# Patient Record
Sex: FEMALE | Race: ASIAN | NOT HISPANIC OR LATINO | Employment: FULL TIME
[De-identification: names, ages, dates, MRNs, and addresses within clinical notes are randomized per-mention and may not be internally consistent; named-entity substitution may affect disease eponyms.]

---

## 2019-08-02 ENCOUNTER — RECORDS - HEALTHEAST (OUTPATIENT)
Dept: ADMINISTRATIVE | Facility: OTHER | Age: 34
End: 2019-08-02

## 2019-08-23 ENCOUNTER — COMMUNICATION - HEALTHEAST (OUTPATIENT)
Dept: TELEHEALTH | Facility: CLINIC | Age: 34
End: 2019-08-23

## 2019-08-23 ENCOUNTER — HOSPITAL ENCOUNTER (OUTPATIENT)
Dept: RADIOLOGY | Facility: CLINIC | Age: 34
Discharge: HOME | End: 2019-08-23
Admitting: RADIOLOGY

## 2019-08-23 DIAGNOSIS — Z31.41 FERTILITY TESTING: ICD-10-CM

## 2019-10-29 ASSESSMENT — MIFFLIN-ST. JEOR: SCORE: 1310.5

## 2019-10-30 ENCOUNTER — ANESTHESIA - HEALTHEAST (OUTPATIENT)
Dept: SURGERY | Facility: HOSPITAL | Age: 34
End: 2019-10-30

## 2019-10-30 ENCOUNTER — SURGERY - HEALTHEAST (OUTPATIENT)
Dept: SURGERY | Facility: HOSPITAL | Age: 34
End: 2019-10-30

## 2019-12-17 ASSESSMENT — MIFFLIN-ST. JEOR: SCORE: 1296.9

## 2019-12-18 ENCOUNTER — ANESTHESIA - HEALTHEAST (OUTPATIENT)
Dept: SURGERY | Facility: AMBULATORY SURGERY CENTER | Age: 34
End: 2019-12-18

## 2019-12-19 ENCOUNTER — SURGERY - HEALTHEAST (OUTPATIENT)
Dept: SURGERY | Facility: AMBULATORY SURGERY CENTER | Age: 34
End: 2019-12-19

## 2019-12-19 ASSESSMENT — MIFFLIN-ST. JEOR: SCORE: 1296.9

## 2020-07-23 ENCOUNTER — MEDICAL CORRESPONDENCE (OUTPATIENT)
Dept: HEALTH INFORMATION MANAGEMENT | Facility: CLINIC | Age: 35
End: 2020-07-23

## 2020-07-31 NOTE — PROGRESS NOTES
-    SUBJECTIVE:                                                   Lavelle Montana is a 35 year old female who presents to clinic today for the following health issue(s):  Patient presents with:  Consult: going through infertility and was told there was something wrong with her cervical canal, letter sent per pt      HPI: The patient is a 35-year-old  0 who is referred for evaluation of cervical stenosis.  She had a laparoscopy in the fall that showed endometriosis.  She was brought back for a second procedure with ultrasound guidance for cervical dilatation.  She did have a hysterosalpingogram that was difficult.  Her menarche was at age 15 and she has relatively regular and sometimes heavy cycles with clots.  She has had no cervical procedures prior to the dilatation that should have caused the cervical stenosis.      Patient's last menstrual period was 2020 (exact date)..     Patient is sexually active, No obstetric history on file..  Using none for contraception.    reports that she has never smoked. She has never used smokeless tobacco.    STD testing offered?  Declined    Health maintenance updated:  yes      Problem list and histories reviewed & adjusted, as indicated.  Additional history: as documented.    Patient Active Problem List   Diagnosis     Other jaw asymmetry     Past Surgical History:   Procedure Laterality Date     HYSTEROSCOPY       LE FORT ONE , OSTEOTOMY SAGITTAL SPLIT, COMBINED  2014    Procedure: COMBINED LE FORT ONE, OSTEOTOMY SAGITTAL SPLIT;  Surgeon: Javi Coello DDS;  Location:  OR      Social History     Tobacco Use     Smoking status: Never Smoker     Smokeless tobacco: Never Used   Substance Use Topics     Alcohol use: Yes     Comment: on weekends           Current Outpatient Medications   Medication Sig     multivitamin, therapeutic with minerals (MULTI-VITAMIN) TABS Take 1 tablet by mouth daily     No current facility-administered medications for this visit.   "    Allergies   Allergen Reactions     Amoxicillin Unknown       ROS:  12 point review of systems negative other than symptoms noted below or in the HPI.  No urinary frequency or dysuria, bladder or kidney problems      OBJECTIVE:     /78   Ht 1.549 m (5' 1\")   Wt 67.6 kg (149 lb)   LMP 08/04/2020 (Exact Date)   Breastfeeding No   BMI 28.15 kg/m    Body mass index is 28.15 kg/m .    Exam:  Constitutional:  Appearance: Well nourished, well developed alert, in no acute distress  Neck:  Lymph Nodes:  No lymphadenopathy present; Thyroid:  Gland size normal, nontender, no nodules or masses present on palpation  Chest:  Respiratory Effort:  Breathing unlabored. Clear to auscultation bilaterally.   Cardiovascular: Heart: Auscultation:  Regular rate, normal rhythm, no murmurs present  Gastrointestinal:  Abdominal Examination:  Abdomen nontender to palpation, tone normal without rigidity or guarding, no masses present, umbilicus without lesions; Liver/Spleen:  No hepatomegaly present, liver nontender to palpation; Hernias:  No hernias present  Lymphatic: Lymph Nodes:  No other lymphadenopathy present  Skin: General Inspection:  No rashes present, no lesions present, no areas of discoloration.  Neurologic:  Mental Status:  Oriented X3.  Normal strength and tone, sensory exam grossly normal, mentation intact and speech normal.    Psychiatric:  Mentation appears normal and affect normal/bright.  Pelvic Exam:  External Genitalia:     Normal appearance for age, no discharge present, no tenderness present, no inflammatory lesions present, color normal  Vagina:     Normal vaginal vault without central or paravaginal defects, no discharge present, no inflammatory lesions present, no masses present  Bladder:     Nontender to palpation  Urethra:   Urethral Body:  Urethra palpation normal, urethra structural support normal   Urethral Meatus:  No erythema or lesions present  Cervix:     Appearance healthy, no lesions present, " nontender to palpation, no bleeding present  Uterus:     Uterus: firm, normal sized and nontender, midplane in position.   Adnexa:     No adnexal tenderness present, no adnexal masses present  Perineum:     Perineum within normal limits, no evidence of trauma, no rashes or skin lesions present  Anus:     Anus within normal limits, no hemorrhoids present  Inguinal Lymph Nodes:     No lymphadenopathy present  Pubic Hair:     Normal pubic hair distribution for age  Genitalia and Groin:     No rashes present, no lesions present, no areas of discoloration, no masses present       In-Clinic Test Results:  No results found for this or any previous visit (from the past 24 hour(s)).    ASSESSMENT/PLAN:                                                        35-year-old patient with a history of endometriosis and cervical stenosis.  Her last ultrasound in May showed persistence of her ovarian cyst at 4 cm.  She has a low AMH and her reproductive doctor does not want to have a laparoscopy and treatment of her endometriosis.  We have discussed cervical over dilatation and hysteroscopy to make sure that the canal looks normal.  We will arrange this at her disposition.  She understands the risk and complications.        Uri Baldwin MD  [facility] [place]

## 2020-08-05 ENCOUNTER — TELEPHONE (OUTPATIENT)
Dept: OBGYN | Facility: CLINIC | Age: 35
End: 2020-08-05

## 2020-08-05 ENCOUNTER — OFFICE VISIT (OUTPATIENT)
Dept: OBGYN | Facility: CLINIC | Age: 35
End: 2020-08-05
Payer: COMMERCIAL

## 2020-08-05 ENCOUNTER — PREP FOR PROCEDURE (OUTPATIENT)
Dept: OBGYN | Facility: CLINIC | Age: 35
End: 2020-08-05

## 2020-08-05 VITALS
SYSTOLIC BLOOD PRESSURE: 110 MMHG | HEIGHT: 61 IN | WEIGHT: 149 LBS | BODY MASS INDEX: 28.13 KG/M2 | DIASTOLIC BLOOD PRESSURE: 78 MMHG

## 2020-08-05 DIAGNOSIS — N88.2 CERVICAL STENOSIS (UTERINE CERVIX): Primary | ICD-10-CM

## 2020-08-05 DIAGNOSIS — Z11.59 ENCOUNTER FOR SCREENING FOR OTHER VIRAL DISEASES: Primary | ICD-10-CM

## 2020-08-05 PROCEDURE — 99203 OFFICE O/P NEW LOW 30 MIN: CPT | Performed by: OBSTETRICS & GYNECOLOGY

## 2020-08-05 RX ORDER — MISOPROSTOL 200 UG/1
400 TABLET ORAL ONCE
Qty: 2 TABLET | Refills: 0 | Status: SHIPPED | OUTPATIENT
Start: 2020-08-05 | End: 2020-08-05

## 2020-08-05 ASSESSMENT — MIFFLIN-ST. JEOR: SCORE: 1313.24

## 2020-08-05 NOTE — TELEPHONE ENCOUNTER
Type of surgery: HYSTEROSCOPY, THERAPEUTIC, cervical dilatation  Location of surgery: [facility]  Date and time of surgery: 9/10/20 7:20- 8:10  Surgeon: Nicolasa  Pre-Op Appt Date: per Nicolasa he will update morning of surgery as she had exam today.  Post-Op Appt Date:    Packet sent out: handed 8/5/20  Pre-cert/Authorization completed:    Date: Chacha 8/5/20    Per coding help [id number]

## 2020-08-05 NOTE — TELEPHONE ENCOUNTER
Associated Diagnoses     Cervical stenosis (uterine cervix) [N88.2]  - Primary        Source Order Set     Order Set Name  Order ID     [id number]    Case Request: Case Info     Panel 1 (Provider: Uri Baldwin MD)     Procedures  Laterality  Anesthesia  Region    HYSTEROSCOPY, THERAPEUTIC, cervical dilatation  N/A  General        Requested date:     Location:  OR    Patient class:        Pre-op diagnoses:  Cervical stenosis (uterine cervix)    Scheduling Instructions     Additional Instructions for the Case

## 2020-08-05 NOTE — PROGRESS NOTES
"    SUBJECTIVE:                                                   Lavelle Montana is a 35 year old female who presents to clinic today for the following health issue(s):  Patient presents with:  Consult: going through infertility and was told there was something wrong with her cervical canal, letter sent per pt      Additional information: ***    HPI:  ***    No LMP recorded..     Patient {is/is not:502120::\"is\"} sexually active, .  Using {[facility] CONTRACEPTION:916309} for contraception.    reports that she has never smoked. She has never used smokeless tobacco.  {Tobacco Cessation -- Delete if patient is a non-smoker:475745}  STD testing offered?  {[facility] GC/CHLAMYDIA:259742}    Health maintenance updated:  {yes no:813725}    Today's PHQ-2 Score: No flowsheet data found.  Today's PHQ-9 Score: No flowsheet data found.  Today's ANU-7 Score: No flowsheet data found.    Problem list and histories reviewed & adjusted, as indicated.  Additional history: as documented.    Patient Active Problem List   Diagnosis     Other jaw asymmetry     Past Surgical History:   Procedure Laterality Date     HYSTEROSCOPY       LE FORT ONE , OSTEOTOMY SAGITTAL SPLIT, COMBINED  2014    Procedure: COMBINED LE FORT ONE, OSTEOTOMY SAGITTAL SPLIT;  Surgeon: Javi Coello DDS;  Location:  OR      Social History     Tobacco Use     Smoking status: Never Smoker     Smokeless tobacco: Never Used   Substance Use Topics     Alcohol use: Yes     Comment: on weekends           Current Outpatient Medications   Medication Sig     multivitamin, therapeutic with minerals (MULTI-VITAMIN) TABS Take 1 tablet by mouth daily     No current facility-administered medications for this visit.      Allergies   Allergen Reactions     Amoxicillin Unknown       ROS:  {[facility] ROSGYN:527298::\"12 point review of systems negative other than symptoms noted below or in the HPI.\"}  {ROS - :516100::\"No urinary frequency or dysuria, bladder or kidney " "problems\"}      OBJECTIVE:     Ht 1.549 m (5' 1\")   Wt 67.6 kg (149 lb)   BMI 28.15 kg/m    Body mass index is 28.15 kg/m .    Exam:  {[facility] EXAM CHOICES:657398}     In-Clinic Test Results:  No results found for this or any previous visit (from the past 24 hour(s)).    ASSESSMENT/PLAN:                                                      No diagnosis found.    There are no Patient Instructions on file for this visit.    ***    Uri Baldwin MD  [facility]  "

## 2020-09-03 RX ORDER — PRENATAL VIT/IRON FUM/FOLIC AC 27MG-0.8MG
1 TABLET ORAL DAILY
COMMUNITY
End: 2023-11-20

## 2020-09-06 ENCOUNTER — AMBULATORY - HEALTHEAST (OUTPATIENT)
Dept: FAMILY MEDICINE | Facility: CLINIC | Age: 35
End: 2020-09-06

## 2020-09-06 ENCOUNTER — AMBULATORY - HEALTHEAST (OUTPATIENT)
Dept: INTERNAL MEDICINE | Facility: CLINIC | Age: 35
End: 2020-09-06

## 2020-09-06 DIAGNOSIS — Z11.59 ENCOUNTER FOR SCREENING FOR OTHER VIRAL DISEASES: ICD-10-CM

## 2020-09-08 ENCOUNTER — COMMUNICATION - HEALTHEAST (OUTPATIENT)
Dept: SCHEDULING | Facility: CLINIC | Age: 35
End: 2020-09-08

## 2020-09-09 ENCOUNTER — ANESTHESIA EVENT (OUTPATIENT)
Dept: SURGERY | Facility: CLINIC | Age: 35
End: 2020-09-09
Payer: COMMERCIAL

## 2020-09-09 ENCOUNTER — TELEPHONE (OUTPATIENT)
Dept: OBGYN | Facility: CLINIC | Age: 35
End: 2020-09-09

## 2020-09-09 RX ORDER — PHENAZOPYRIDINE HYDROCHLORIDE 200 MG/1
200 TABLET, FILM COATED ORAL ONCE
Status: CANCELLED | OUTPATIENT
Start: 2020-09-09 | End: 2020-09-09

## 2020-09-09 NOTE — H&P
Cervical stenosis (uterine cervix)   Dx   Consult     Reason for Visit    Progress Notes        -     SUBJECTIVE:                                                   Lavelle Montana is a 35 year old female who presents to clinic today for the following health issue(s):  Patient presents with:  Consult: going through infertility and was told there was something wrong with her cervical canal, letter sent per pt        HPI: The patient is a 35-year-old  0 who is referred for evaluation of cervical stenosis.  She had a laparoscopy in the fall that showed endometriosis.  She was brought back for a second procedure with ultrasound guidance for cervical dilatation.  She did have a hysterosalpingogram that was difficult.  Her menarche was at age 15 and she has relatively regular and sometimes heavy cycles with clots.  She has had no cervical procedures prior to the dilatation that should have caused the cervical stenosis.        Patient's last menstrual period was 2020 (exact date)..      Patient is sexually active, No obstetric history on file..  Using none for contraception.    reports that she has never smoked. She has never used smokeless tobacco.     STD testing offered?  Declined     Health maintenance updated:  yes        Problem list and histories reviewed & adjusted, as indicated.  Additional history: as documented.         Patient Active Problem List   Diagnosis     Other jaw asymmetry             Past Surgical History:   Procedure Laterality Date     HYSTEROSCOPY         LE FORT ONE , OSTEOTOMY SAGITTAL SPLIT, COMBINED   2014     Procedure: COMBINED LE FORT ONE, OSTEOTOMY SAGITTAL SPLIT;  Surgeon: Javi Coello DDS;  Location:  OR       Social History            Tobacco Use     Smoking status: Never Smoker     Smokeless tobacco: Never Used   Substance Use Topics     Alcohol use: Yes       Comment: on weekends                 Current Outpatient Medications   Medication Sig     multivitamin,  "therapeutic with minerals (MULTI-VITAMIN) TABS Take 1 tablet by mouth daily      No current facility-administered medications for this visit.            Allergies   Allergen Reactions     Amoxicillin Unknown         ROS:  12 point review of systems negative other than symptoms noted below or in the HPI.  No urinary frequency or dysuria, bladder or kidney problems        OBJECTIVE:      /78   Ht 1.549 m (5' 1\")   Wt 67.6 kg (149 lb)   LMP 08/04/2020 (Exact Date)   Breastfeeding No   BMI 28.15 kg/m    Body mass index is 28.15 kg/m .     Exam:  Constitutional:  Appearance: Well nourished, well developed alert, in no acute distress  Neck:  Lymph Nodes:  No lymphadenopathy present; Thyroid:  Gland size normal, nontender, no nodules or masses present on palpation  Chest:  Respiratory Effort:  Breathing unlabored. Clear to auscultation bilaterally.   Cardiovascular: Heart: Auscultation:  Regular rate, normal rhythm, no murmurs present  Gastrointestinal:  Abdominal Examination:  Abdomen nontender to palpation, tone normal without rigidity or guarding, no masses present, umbilicus without lesions; Liver/Spleen:  No hepatomegaly present, liver nontender to palpation; Hernias:  No hernias present  Lymphatic: Lymph Nodes:  No other lymphadenopathy present  Skin: General Inspection:  No rashes present, no lesions present, no areas of discoloration.  Neurologic:  Mental Status:  Oriented X3.  Normal strength and tone, sensory exam grossly normal, mentation intact and speech normal.    Psychiatric:  Mentation appears normal and affect normal/bright.  Pelvic Exam:  External Genitalia:                Normal appearance for age, no discharge present, no tenderness present, no inflammatory lesions present, color normal  Vagina:                Normal vaginal vault without central or paravaginal defects, no discharge present, no inflammatory lesions present, no masses present  Bladder:                Nontender to " "palpation  Urethra:              Urethral Body:  Urethra palpation normal, urethra structural support normal              Urethral Meatus:  No erythema or lesions present  Cervix:                Appearance healthy, no lesions present, nontender to palpation, no bleeding present  Uterus:                Uterus: firm, normal sized and nontender, midplane in position.   Adnexa:                No adnexal tenderness present, no adnexal masses present  Perineum:                Perineum within normal limits, no evidence of trauma, no rashes or skin lesions present  Anus:                Anus within normal limits, no hemorrhoids present  Inguinal Lymph Nodes:                No lymphadenopathy present  Pubic Hair:                Normal pubic hair distribution for age  Genitalia and Groin:                No rashes present, no lesions present, no areas of discoloration, no masses present        In-Clinic Test Results:  No results found for this or any previous visit (from the past 24 hour(s)).     ASSESSMENT/PLAN:                                                          35-year-old patient with a history of endometriosis and cervical stenosis.  Her last ultrasound in May showed persistence of her ovarian cyst at 4 cm.  She has a low AMH and her reproductive doctor does not want to have a laparoscopy and treatment of her endometriosis.  We have discussed cervical over dilatation and hysteroscopy to make sure that the canal looks normal.  We will arrange this at her disposition.  She understands the risk and complications.           Uri Baldwin MD  [facility] [place]      Instructions      After Visit Summary (Automatic SnapShot taken 8/5/2020)   Additional Documentation     Vitals:     /78    Ht 1.549 m (5' 1\")    Wt 67.6 kg (149 lb)    LMP 08/04/2020 (Exact Date)    Breastfeeding No    BMI 28.15 kg/m     BSA 1.71 m        More Vitals    Flowsheets:     NICU VS,    Anthropometrics,    Vitals Reassessment, "    Lactation       Encounter Info:     Billing Info,    History,    Allergies,    Detailed Report       AVS Reports     Date/Time  Report  Action  User    8/5/2020 10:35 AM  After Visit Summary  Automatically Generated  Uri Baldwin MD    Encounter Information      Provider  Department  Encounter #  Center    8/5/2020 9:45 AM  Uri Baldwin MD  [facility]  [id number]  [facility]    Reviewed this Encounter      Medications  Problems  Allergies  History    Ninfa Guevara, UZMA    Reviewed  Alcohol, Drug Use, Medical, Obstetric, Sexual Activity, Surgical, Tobacco    Communicable/Travel screen     Communicable/Travel Screening  8/5/2020    In the last month, have you been in contact with someone who was confirmed or suspected to have Coronavirus / COVID-19?  No / Unsure    Do you have any of the following symptoms?  None of these    Have you traveled internationally in the last month?  No    View Complete Flowsheet     Orders Placed      None   Medication Changes         miSOPROStol 400 mcg Vaginal ONCE, Night before procedure      Medication List     Visit Diagnoses         Cervical stenosis (uterine cervix)      Problem List

## 2020-09-09 NOTE — TELEPHONE ENCOUNTER
Patient concerned that she was prescribed misoprostol prior to her procedure tomorrow and she has read that it can cause birth defects and abortions.    Reassured patient that this is only small one time dose to soften cervix for the procedure and will okay to take.     Pt verbalized understanding.  Routing to provider as CLIFF.  Gia Olguin RN on 9/9/2020 at 3:24 PM

## 2020-09-10 ENCOUNTER — ANESTHESIA (OUTPATIENT)
Dept: SURGERY | Facility: CLINIC | Age: 35
End: 2020-09-10
Payer: COMMERCIAL

## 2020-09-10 ENCOUNTER — SURGERY (OUTPATIENT)
Age: 35
End: 2020-09-10
Payer: COMMERCIAL

## 2020-09-10 ENCOUNTER — HOSPITAL ENCOUNTER (OUTPATIENT)
Facility: CLINIC | Age: 35
Discharge: HOME | End: 2020-09-10
Attending: OBSTETRICS & GYNECOLOGY | Admitting: OBSTETRICS & GYNECOLOGY
Payer: COMMERCIAL

## 2020-09-10 VITALS
HEART RATE: 71 BPM | SYSTOLIC BLOOD PRESSURE: 113 MMHG | HEIGHT: 60 IN | OXYGEN SATURATION: 97 % | WEIGHT: 152 LBS | BODY MASS INDEX: 29.84 KG/M2 | DIASTOLIC BLOOD PRESSURE: 75 MMHG | RESPIRATION RATE: 16 BRPM | TEMPERATURE: 98.1 F

## 2020-09-10 DIAGNOSIS — N88.2 CERVICAL STENOSIS (UTERINE CERVIX): ICD-10-CM

## 2020-09-10 LAB — B-HCG SERPL-ACNC: <1 IU/L (ref 0–5)

## 2020-09-10 PROCEDURE — 88305 TISSUE EXAM BY PATHOLOGIST: CPT | Mod: 26 | Performed by: OBSTETRICS & GYNECOLOGY

## 2020-09-10 PROCEDURE — 40000170 ZZH STATISTIC PRE-PROCEDURE ASSESSMENT II: Performed by: OBSTETRICS & GYNECOLOGY

## 2020-09-10 PROCEDURE — 25000125 ZZHC RX 250: Performed by: OBSTETRICS & GYNECOLOGY

## 2020-09-10 PROCEDURE — 25000566 ZZH SEVOFLURANE, EA 15 MIN: Performed by: OBSTETRICS & GYNECOLOGY

## 2020-09-10 PROCEDURE — 25800030 ZZH RX IP 258 OP 636: Performed by: NURSE ANESTHETIST, CERTIFIED REGISTERED

## 2020-09-10 PROCEDURE — 36000056 ZZH SURGERY LEVEL 3 1ST 30 MIN: Performed by: OBSTETRICS & GYNECOLOGY

## 2020-09-10 PROCEDURE — 25000132 ZZH RX MED GY IP 250 OP 250 PS 637: Performed by: OBSTETRICS & GYNECOLOGY

## 2020-09-10 PROCEDURE — 37000008 ZZH ANESTHESIA TECHNICAL FEE, 1ST 30 MIN: Performed by: OBSTETRICS & GYNECOLOGY

## 2020-09-10 PROCEDURE — 71000027 ZZH RECOVERY PHASE 2 EACH 15 MINS: Performed by: OBSTETRICS & GYNECOLOGY

## 2020-09-10 PROCEDURE — 25000128 H RX IP 250 OP 636: Performed by: NURSE ANESTHETIST, CERTIFIED REGISTERED

## 2020-09-10 PROCEDURE — 27210794 ZZH OR GENERAL SUPPLY STERILE: Performed by: OBSTETRICS & GYNECOLOGY

## 2020-09-10 PROCEDURE — 36000058 ZZH SURGERY LEVEL 3 EA 15 ADDTL MIN: Performed by: OBSTETRICS & GYNECOLOGY

## 2020-09-10 PROCEDURE — 25000128 H RX IP 250 OP 636: Performed by: ANESTHESIOLOGY

## 2020-09-10 PROCEDURE — 25000125 ZZHC RX 250: Performed by: NURSE ANESTHETIST, CERTIFIED REGISTERED

## 2020-09-10 PROCEDURE — 71000012 ZZH RECOVERY PHASE 1 LEVEL 1 FIRST HR: Performed by: OBSTETRICS & GYNECOLOGY

## 2020-09-10 PROCEDURE — 58558 HYSTEROSCOPY BIOPSY: CPT | Mod: 51 | Performed by: OBSTETRICS & GYNECOLOGY

## 2020-09-10 PROCEDURE — 25800025 ZZH RX 258: Performed by: OBSTETRICS & GYNECOLOGY

## 2020-09-10 PROCEDURE — 84702 CHORIONIC GONADOTROPIN TEST: CPT | Performed by: OBSTETRICS & GYNECOLOGY

## 2020-09-10 PROCEDURE — 58559 HYSTEROSCOPY LYSIS: CPT | Performed by: OBSTETRICS & GYNECOLOGY

## 2020-09-10 PROCEDURE — 37000009 ZZH ANESTHESIA TECHNICAL FEE, EACH ADDTL 15 MIN: Performed by: OBSTETRICS & GYNECOLOGY

## 2020-09-10 PROCEDURE — 88305 TISSUE EXAM BY PATHOLOGIST: CPT | Performed by: OBSTETRICS & GYNECOLOGY

## 2020-09-10 PROCEDURE — 36415 COLL VENOUS BLD VENIPUNCTURE: CPT | Performed by: OBSTETRICS & GYNECOLOGY

## 2020-09-10 RX ORDER — KETOROLAC TROMETHAMINE 30 MG/ML
INJECTION, SOLUTION INTRAMUSCULAR; INTRAVENOUS PRN
Status: DISCONTINUED | OUTPATIENT
Start: 2020-09-10 | End: 2020-09-10

## 2020-09-10 RX ORDER — HYDROCODONE BITARTRATE AND ACETAMINOPHEN 5; 325 MG/1; MG/1
1-2 TABLET ORAL EVERY 4 HOURS PRN
Qty: 10 TABLET | Refills: 0 | Status: SHIPPED | OUTPATIENT
Start: 2020-09-10 | End: 2020-09-21

## 2020-09-10 RX ORDER — HYDROMORPHONE HYDROCHLORIDE 1 MG/ML
.3-.5 INJECTION, SOLUTION INTRAMUSCULAR; INTRAVENOUS; SUBCUTANEOUS EVERY 10 MIN PRN
Status: DISCONTINUED | OUTPATIENT
Start: 2020-09-10 | End: 2020-09-10 | Stop reason: HOSPADM

## 2020-09-10 RX ORDER — FENTANYL CITRATE 50 UG/ML
25-100 INJECTION, SOLUTION INTRAMUSCULAR; INTRAVENOUS
Status: DISCONTINUED | OUTPATIENT
Start: 2020-09-10 | End: 2020-09-10 | Stop reason: HOSPADM

## 2020-09-10 RX ORDER — MAGNESIUM HYDROXIDE 1200 MG/15ML
LIQUID ORAL PRN
Status: DISCONTINUED | OUTPATIENT
Start: 2020-09-10 | End: 2020-09-10 | Stop reason: HOSPADM

## 2020-09-10 RX ORDER — SODIUM CHLORIDE, SODIUM LACTATE, POTASSIUM CHLORIDE, CALCIUM CHLORIDE 600; 310; 30; 20 MG/100ML; MG/100ML; MG/100ML; MG/100ML
INJECTION, SOLUTION INTRAVENOUS CONTINUOUS PRN
Status: DISCONTINUED | OUTPATIENT
Start: 2020-09-10 | End: 2020-09-10

## 2020-09-10 RX ORDER — ONDANSETRON 4 MG/1
4 TABLET, ORALLY DISINTEGRATING ORAL EVERY 30 MIN PRN
Status: DISCONTINUED | OUTPATIENT
Start: 2020-09-10 | End: 2020-09-10 | Stop reason: HOSPADM

## 2020-09-10 RX ORDER — PROPOFOL 10 MG/ML
INJECTION, EMULSION INTRAVENOUS PRN
Status: DISCONTINUED | OUTPATIENT
Start: 2020-09-10 | End: 2020-09-10

## 2020-09-10 RX ORDER — FENTANYL CITRATE 50 UG/ML
INJECTION, SOLUTION INTRAMUSCULAR; INTRAVENOUS PRN
Status: DISCONTINUED | OUTPATIENT
Start: 2020-09-10 | End: 2020-09-10

## 2020-09-10 RX ORDER — ONDANSETRON 2 MG/ML
4 INJECTION INTRAMUSCULAR; INTRAVENOUS EVERY 30 MIN PRN
Status: DISCONTINUED | OUTPATIENT
Start: 2020-09-10 | End: 2020-09-10 | Stop reason: HOSPADM

## 2020-09-10 RX ORDER — PROPOFOL 10 MG/ML
INJECTION, EMULSION INTRAVENOUS CONTINUOUS PRN
Status: DISCONTINUED | OUTPATIENT
Start: 2020-09-10 | End: 2020-09-10

## 2020-09-10 RX ORDER — MEPERIDINE HYDROCHLORIDE 25 MG/ML
12.5 INJECTION INTRAMUSCULAR; INTRAVENOUS; SUBCUTANEOUS
Status: DISCONTINUED | OUTPATIENT
Start: 2020-09-10 | End: 2020-09-10 | Stop reason: HOSPADM

## 2020-09-10 RX ORDER — LIDOCAINE HYDROCHLORIDE 20 MG/ML
INJECTION, SOLUTION INFILTRATION; PERINEURAL PRN
Status: DISCONTINUED | OUTPATIENT
Start: 2020-09-10 | End: 2020-09-10

## 2020-09-10 RX ORDER — FENTANYL CITRATE 50 UG/ML
25-50 INJECTION, SOLUTION INTRAMUSCULAR; INTRAVENOUS
Status: DISCONTINUED | OUTPATIENT
Start: 2020-09-10 | End: 2020-09-10 | Stop reason: HOSPADM

## 2020-09-10 RX ORDER — DEXAMETHASONE SODIUM PHOSPHATE 4 MG/ML
INJECTION, SOLUTION INTRA-ARTICULAR; INTRALESIONAL; INTRAMUSCULAR; INTRAVENOUS; SOFT TISSUE PRN
Status: DISCONTINUED | OUTPATIENT
Start: 2020-09-10 | End: 2020-09-10

## 2020-09-10 RX ORDER — NALOXONE HYDROCHLORIDE 0.4 MG/ML
.1-.4 INJECTION, SOLUTION INTRAMUSCULAR; INTRAVENOUS; SUBCUTANEOUS
Status: DISCONTINUED | OUTPATIENT
Start: 2020-09-10 | End: 2020-09-10 | Stop reason: HOSPADM

## 2020-09-10 RX ORDER — DIAZEPAM 10 MG/2ML
2.5 INJECTION, SOLUTION INTRAMUSCULAR; INTRAVENOUS
Status: DISCONTINUED | OUTPATIENT
Start: 2020-09-10 | End: 2020-09-10 | Stop reason: HOSPADM

## 2020-09-10 RX ORDER — KETOROLAC TROMETHAMINE 30 MG/ML
30 INJECTION, SOLUTION INTRAMUSCULAR; INTRAVENOUS EVERY 6 HOURS PRN
Status: DISCONTINUED | OUTPATIENT
Start: 2020-09-10 | End: 2020-09-10 | Stop reason: HOSPADM

## 2020-09-10 RX ORDER — HYDROCODONE BITARTRATE AND ACETAMINOPHEN 5; 325 MG/1; MG/1
2 TABLET ORAL
Status: COMPLETED | OUTPATIENT
Start: 2020-09-10 | End: 2020-09-10

## 2020-09-10 RX ORDER — ACETAMINOPHEN 650 MG/1
650 SUPPOSITORY RECTAL EVERY 4 HOURS PRN
Status: DISCONTINUED | OUTPATIENT
Start: 2020-09-10 | End: 2020-09-10 | Stop reason: HOSPADM

## 2020-09-10 RX ORDER — SODIUM CHLORIDE, SODIUM LACTATE, POTASSIUM CHLORIDE, CALCIUM CHLORIDE 600; 310; 30; 20 MG/100ML; MG/100ML; MG/100ML; MG/100ML
INJECTION, SOLUTION INTRAVENOUS CONTINUOUS
Status: DISCONTINUED | OUTPATIENT
Start: 2020-09-10 | End: 2020-09-10 | Stop reason: HOSPADM

## 2020-09-10 RX ORDER — ONDANSETRON 2 MG/ML
INJECTION INTRAMUSCULAR; INTRAVENOUS PRN
Status: DISCONTINUED | OUTPATIENT
Start: 2020-09-10 | End: 2020-09-10

## 2020-09-10 RX ADMIN — SILVER NITRATE APPLICATORS 2 EACH: 25; 75 STICK TOPICAL at 07:52

## 2020-09-10 RX ADMIN — PROPOFOL 100 MCG/KG/MIN: 10 INJECTION, EMULSION INTRAVENOUS at 07:24

## 2020-09-10 RX ADMIN — ONDANSETRON 4 MG: 2 INJECTION INTRAMUSCULAR; INTRAVENOUS at 07:31

## 2020-09-10 RX ADMIN — PROPOFOL 200 MG: 10 INJECTION, EMULSION INTRAVENOUS at 07:24

## 2020-09-10 RX ADMIN — MIDAZOLAM 2 MG: 1 INJECTION INTRAMUSCULAR; INTRAVENOUS at 07:22

## 2020-09-10 RX ADMIN — SODIUM CHLORIDE 1000 ML: 900 IRRIGANT IRRIGATION at 07:45

## 2020-09-10 RX ADMIN — LIDOCAINE HYDROCHLORIDE 60 MG: 20 INJECTION, SOLUTION INFILTRATION; PERINEURAL at 07:24

## 2020-09-10 RX ADMIN — FENTANYL CITRATE 50 MCG: 0.05 INJECTION, SOLUTION INTRAMUSCULAR; INTRAVENOUS at 08:28

## 2020-09-10 RX ADMIN — DEXAMETHASONE SODIUM PHOSPHATE 4 MG: 4 INJECTION, SOLUTION INTRA-ARTICULAR; INTRALESIONAL; INTRAMUSCULAR; INTRAVENOUS; SOFT TISSUE at 07:31

## 2020-09-10 RX ADMIN — FENTANYL CITRATE 50 MCG: 50 INJECTION, SOLUTION INTRAMUSCULAR; INTRAVENOUS at 07:39

## 2020-09-10 RX ADMIN — FENTANYL CITRATE 25 MCG: 50 INJECTION, SOLUTION INTRAMUSCULAR; INTRAVENOUS at 07:24

## 2020-09-10 RX ADMIN — SODIUM CHLORIDE 1000 ML: 900 IRRIGANT IRRIGATION at 07:42

## 2020-09-10 RX ADMIN — SODIUM CHLORIDE, POTASSIUM CHLORIDE, SODIUM LACTATE AND CALCIUM CHLORIDE: 600; 310; 30; 20 INJECTION, SOLUTION INTRAVENOUS at 07:22

## 2020-09-10 RX ADMIN — KETOROLAC TROMETHAMINE 30 MG: 30 INJECTION, SOLUTION INTRAMUSCULAR at 07:52

## 2020-09-10 RX ADMIN — SODIUM CHLORIDE 1000 ML: 900 IRRIGANT IRRIGATION at 07:37

## 2020-09-10 RX ADMIN — HYDROCODONE BITARTRATE AND ACETAMINOPHEN 2 TABLET: 5; 325 TABLET ORAL at 08:52

## 2020-09-10 ASSESSMENT — MIFFLIN-ST. JEOR: SCORE: 1310.97

## 2020-09-10 NOTE — ANESTHESIA PREPROCEDURE EVALUATION
Anesthesia Pre-Procedure Evaluation    Patient: Lavelle Montana   MRN: [id number] : 1985          Preoperative Diagnosis: Cervical stenosis (uterine cervix) [N88.2]    Procedure(s):  cervical dilatation AND HYSTEROSCOPY    Past Medical History:   Diagnosis Date     Cervical stenosis (uterine cervix)      Endometriosis      Infertility, female      Past Surgical History:   Procedure Laterality Date     HYSTEROSCOPY       LE FORT ONE , OSTEOTOMY SAGITTAL SPLIT, COMBINED  2014    Procedure: COMBINED LE FORT ONE, OSTEOTOMY SAGITTAL SPLIT;  Surgeon: Javi Coello DDS;  Location:  OR       Anesthesia Evaluation     . Pt has had prior anesthetic.     No history of anesthetic complications          ROS/MED HX    ENT/Pulmonary:      (-) sleep apnea   Neurologic:       Cardiovascular:         METS/Exercise Tolerance:     Hematologic:         Musculoskeletal:         GI/Hepatic:        (-) GERD   Renal/Genitourinary:         Endo:     (+) Obesity (BMI 30), .      Psychiatric:         Infectious Disease:         Malignancy:         Other:                          Physical Exam  Normal systems: cardiovascular, pulmonary and dental    Airway   Mallampati: II  TM distance: >3 FB  Neck ROM: full    Dental     Cardiovascular       Pulmonary             Lab Results   Component Value Date    HCG Negative 2014       Preop Vitals  BP Readings from Last 3 Encounters:   09/10/20 130/77   20 110/78   14 100/67    Pulse Readings from Last 3 Encounters:   14 64      Resp Readings from Last 3 Encounters:   09/10/20 16   14 16    SpO2 Readings from Last 3 Encounters:   09/10/20 95%   14 97%      Temp Readings from Last 1 Encounters:   09/10/20 36.8  C (98.3  F) (Oral)    Ht Readings from Last 1 Encounters:   09/10/20 1.524 m (5')      Wt Readings from Last 1 Encounters:   09/10/20 68.9 kg (152 lb)    Estimated body mass index is 29.69 kg/m  as calculated from the following:    Height as of this  encounter: 1.524 m (5').    Weight as of this encounter: 68.9 kg (152 lb).       Anesthesia Plan      History & Physical Review  History and physical reviewed and following examination; no interval change.    ASA Status:  2 .    NPO Status:  > 8 hours    Plan for General with Intravenous induction. Maintenance will be Balanced.    PONV prophylaxis:  Ondansetron (or other 5HT-3)  Propofol gtt, zofran, decadron  Toradol 30mg IV        Postoperative Care  Postoperative pain management:  IV analgesics.      Consents  Anesthetic plan, risks, benefits and alternatives discussed with:  Patient..                 Konstantin Rosen MD

## 2020-09-10 NOTE — OP NOTE
Procedure Date: 09/10/2020      REASON FOR ADMISSION:  Cervical stenosis.      POSTOPERATIVE DIAGNOSIS:  Cervical stenosis      PROCEDURES PERFORMED:  Examination under anesthesia, cervical dilatation, hysteroscopy, endometrial curettage, lysis of lower uterine segment to cervix adhesive band.      OPERATIVE FINDINGS:  The patient had normal external cervical os and mildly anteflexed uterus that was normal size.  On dilatation, it was obvious that there was a very tight ridge anteriorly at the upper cervix, lower uterine segment.  By ducking under this with dilators, we were able to eventually get up to the point where the hysteroscope would be placed.  The endometrial cavity was essentially normal, with both tubal ostia visualized.  There was some thickened tissue posteriorly that was carefully removed.  The cervix was over-dilated and we also used the grasping forceps to take some of the more stubborn scar tissue down in the anterior lower uterine segment.  At the conclusion of the procedure, the endocervical canal was free, but we still had to do somewhat of a duck-under maneuver even with over dilatation.      DESCRIPTION OF PROCEDURE:  After general anesthesia was induced, the patient was placed in the dorsal lithotomy position and prepped and draped in the usual fashion.  Examination showed a firm midline uterus that was mildly anteflexed.  The anterior cervical lip was grasped and endocervix carefully dilated.  It became obvious that there was a very strong impediment at approximately 2 cm anterior in the cervix to the lower uterine segment.  We were able to dilate underneath this and dilate through enough to place the hysteroscope.  The above findings were noted.  There was some fluffy endometrial tissue cleared posteriorly.  As we backed the scope out, there was a very strong adhesive band that was like a wall that emanated anteriorly in the lower uterine segment and upper cervix.  The grasping forceps was  used to take down some of this connective tissue.  We over-dilated the cervix to a #30 Otero dilator.  All of these findings were photodocumented.  The patient tolerated this very well.  Minimal curettings were submitted to the pathologist, but I do not expect anything abnormal.  All instruments were removed and the procedure was concluded.        ESTIMATED BLOOD LOSS:  Less than 5 mL.        FLUID DEFICIT:  Less than 100 mL      The patient went to the recovery room in satisfactory condition.  Toradol 30 mg was given at this time.  She will be given Vicodin as needed for discomfort at home.  She is asked to call for any fever, chills, change in bowel or bladder function.         DENISE SORIANO JR, MD             D: 09/10/2020   T: 09/10/2020   MT: VEENA      Name:     RUTHIE MARIO   MRN:      [id number]        Account:        [id number]   :      1985           Procedure Date: 09/10/2020      Document: [id number]       cc: MD Roc Estrada Jr, MD

## 2020-09-10 NOTE — ANESTHESIA CARE TRANSFER NOTE
Patient: Lavelle Montana    Procedure(s):  cervical dilatation AND HYSTEROSCOPY    Diagnosis: Cervical stenosis (uterine cervix) [N88.2]  Diagnosis Additional Information: No value filed.    Anesthesia Type:   General     Note:  Airway :Face Mask  Patient transferred to:PACU  Comments: At end of procedure, spontaneous respirations, adequate tidal volumes, followed commands to voice, LMA removed atraumatically, airway patent after LMA removal. Oxygen via facemask at 6 liters per minute to PACU. Oxygen tubing connected to wall O2 in PACU, SpO2, NiBP, and EKG monitors and alarms on and functioning, Andres Hugger warmer connected to patient gown, report on patient's clinical status given to PACU RN, RN questions answered.    /83  HR 96  RR 20  O2 100%    Handoff Report: Identifed the Patient, Identified the Reponsible Provider, Reviewed the pertinent medical history, Discussed the surgical course, Reviewed Intra-OP anesthesia mangement and issues during anesthesia, Set expectations for post-procedure period and Allowed opportunity for questions and acknowledgement of understanding      Vitals: (Last set prior to Anesthesia Care Transfer)    CRNA VITALS  9/10/2020 0731 - 9/10/2020 0806      9/10/2020             Pulse:  91    SpO2:  100 %    Resp Rate (observed):  9    Resp Rate (set):  10                Electronically Signed By: Christine Marie Volp Hodgkins, CRNA, APRN CRNA  September 10, 2020  8:06 AM

## 2020-09-10 NOTE — DISCHARGE INSTRUCTIONS
Today you received Toradol, an antiinflammatory medication similar to Ibuprofen.  You should not take other antiinflammatory medication, such as Ibuprofen, Motrin, Advil, Aleve, Naprosyn, etc until 1:52 pm.     Same Day Surgery Discharge Instructions for  Sedation and General Anesthesia       It's not unusual to feel dizzy, light-headed or faint for up to 24 hours after surgery or while taking pain medication.  If you have these symptoms: sit for a few minutes before standing and have someone assist you when you get up to walk or use the bathroom.      You should rest and relax for the next 24 hours. We recommend you make arrangements to have an adult stay with you for at least 24 hours after your discharge.  Avoid hazardous and strenuous activity.      DO NOT DRIVE any vehicle or operate mechanical equipment for 24 hours following the end of your surgery.  Even though you may feel normal, your reactions may be affected by the medication you have received.      Do not drink alcoholic beverages for 24 hours following surgery.       Slowly progress to your regular diet as you feel able. It's not unusual to feel nauseated and/or vomit after receiving anesthesia.  If you develop these symptoms, drink clear liquids (apple juice, ginger ale, broth, 7-up, etc. ) until you feel better.  If your nausea and vomiting persists for 24 hours, please notify your surgeon.        All narcotic pain medications, along with inactivity and anesthesia, can cause constipation. Drinking plenty of liquids and increasing fiber intake will help.      For any questions of a medical nature, call your surgeon.      Do not make important decisions for 24 hours.      If you had general anesthesia, you may have a sore throat for a couple of days related to the breathing tube used during surgery.  You may use Cepacol lozenges to help with this discomfort.  If it worsens or if you develop a fever, contact your surgeon.       If you feel your pain is  not well managed with the pain medications prescribed by your surgeon, please contact your surgeon's office to let them know so they can address your concerns.       CoVid 19 Information    We want to give you information regarding Covid. Please consult your primary care provider with any questions you might have.     Patient who have symptoms (cough, fever, or shortness of breath), need to isolate for 7 days from when symptoms started OR 72 hours after fever resolves (without fever reducing medications) AND improvement of respiratory symptoms (whichever is longer).      Isolate yourself at home (in own room/own bathroom if possible)    Do Not allow any visitors    Do Not go to work or school    Do Not go to [employer],  centers, shopping, or other public places.    Do Not shake hands.    Avoid close and intimate contact with others (hugging, kissing).    Follow CDC recommendations for household cleaning of frequently touched services.     After the initial 7 days, continue to isolate yourself from household members as much as possible. To continue decrease the risk of community spread and exposure, you and any members of your household should limit activities in public for 14 days after starting home isolation.     You can reference the following CDC link for helpful home isolation/care tips:  https://www.cdc.gov/coronavirus/2019-ncov/downloads/10Things.pdf    Protect Others:    Cover Your Mouth and Nose with a mask, disposable tissue or wash cloth to avoid spreading germs to others.    Wash your hands and face frequently with soap and water    Call Your Primary Doctor If: Breathing difficulty develops or you become worse.    For more information about COVID19 and options for caring for yourself at home, please visit the CDC website at https://www.cdc.gov/coronavirus/2019-ncov/about/steps-when-sick.html  For more options for care at [facility], please visit our website at  [email or web address]    [facility]  Discharge Instructions  Following D & C / Hysteroscopy    Activity  You may resume normal activities including lifting as needed.  It is permissible to climb stairs. You may drive after 24 hours as long as you are not taking narcotic pain pills.  Baths or showers are perfectly acceptable.      Vaginal Discharge  You may have some vaginal bleeding or discharge for about a week after procedure.  You may use tampons or pads.    Temperature  If you develop temperature elevations to over 101  Fahrenheit, your physician should be called immediately.    Diet  [place] or light diet is advisable the day of surgery.  If nausea persists, continue this diet.  If severe, call.    Follow-up  Make an appointment in 1-2 weeks if instructed to at: [phone]        [facility]  [phone]

## 2020-09-10 NOTE — ANESTHESIA POSTPROCEDURE EVALUATION
Patient: Lavelle Montana    Procedure(s):  cervical dilatation AND HYSTEROSCOPY    Diagnosis:Cervical stenosis (uterine cervix) [N88.2]  Diagnosis Additional Information: No value filed.    Anesthesia Type:  General    Note:  Anesthesia Post Evaluation    Patient location during evaluation: PACU  Patient participation: Able to fully participate in evaluation  Level of consciousness: awake  Pain management: adequate  Airway patency: patent  Cardiovascular status: acceptable  Respiratory status: acceptable  Hydration status: acceptable  PONV: controlled     Anesthetic complications: None          Last vitals:  Vitals:    09/10/20 0830 09/10/20 0845 09/10/20 0858   BP: 107/74 106/62 109/76   Pulse: 79 78 75   Resp: 16 19 19   Temp: 36.7  C (98.1  F) 36.7  C (98.1  F) 36.7  C (98.1  F)   SpO2: 94% 95% 96%         Electronically Signed By: Konstantin Rosen MD  September 10, 2020  9:34 AM

## 2020-09-10 NOTE — BRIEF OP NOTE
[facility] Brief Operative Note    Pre-operative diagnosis: Cervical stenosis (uterine cervix) [N88.2]   Post-operative diagnosis same   Procedure: Procedure(s):  cervical dilatation AND HYSTEROSCOPY,LYSIS OF LOWER UTERINE SEGMENT ADHESIONS   Surgeon(s): Surgeon(s) and Role:     * Uri Baldwin MD - Primary   Estimated blood loss: 5 mL    Specimens: ID Type Source Tests Collected by Time Destination   A : ENDOMETRIAL CURETTINGS Tissue Uterus SURGICAL PATHOLOGY EXAM Uri Baldwin MD 9/10/2020  7:45 AM       Findings:

## 2020-09-11 LAB — COPATH REPORT: NORMAL

## 2020-09-21 ENCOUNTER — OFFICE VISIT (OUTPATIENT)
Dept: OBGYN | Facility: CLINIC | Age: 35
End: 2020-09-21
Payer: COMMERCIAL

## 2020-09-21 VITALS
SYSTOLIC BLOOD PRESSURE: 100 MMHG | DIASTOLIC BLOOD PRESSURE: 64 MMHG | HEIGHT: 60 IN | BODY MASS INDEX: 30.23 KG/M2 | HEART RATE: 68 BPM | WEIGHT: 154 LBS

## 2020-09-21 DIAGNOSIS — Z09 POSTOP CHECK: Primary | ICD-10-CM

## 2020-09-21 PROCEDURE — 99212 OFFICE O/P EST SF 10 MIN: CPT | Performed by: OBSTETRICS & GYNECOLOGY

## 2020-09-21 ASSESSMENT — MIFFLIN-ST. JEOR: SCORE: 1320.04

## 2020-09-21 NOTE — PROGRESS NOTES
The patient is seen at this time for her postoperative check.  She had a hysteroscopy and we defeated a stubborn impediment at the lower uterine segment and cervical junction anteriorly.  We removed some scar tissue.  Her cavity was clean and both tubal ostia visualized.  We over dilated the cervical canal.  The patient has not had a menses yet but anticipates that soon.  All findings were reviewed with the patient and she is sent back to her previous physician.

## 2020-12-20 ENCOUNTER — HEALTH MAINTENANCE LETTER (OUTPATIENT)
Age: 35
End: 2020-12-20

## 2021-06-02 NOTE — ANESTHESIA PREPROCEDURE EVALUATION
Anesthesia Evaluation      No history of anesthetic complications     Airway   Mallampati: III  Neck ROM: full  Comment: Short TM distance    Pulmonary - negative ROS    breath sounds clear to auscultation                         Cardiovascular   Exercise tolerance: > or = 4 METS  ECG reviewed  Rhythm: regular  Rate: normal,         Neuro/Psych - negative ROS     Endo/Other - negative ROS   (+) obesity,      GI/Hepatic/Renal - negative ROS     Comments: R ovarian endometrioma, s/f laparoscopic bilateral ovarian cystectomies           Dental - normal exam     Comment: Permanent upper and lower retainers                        Anesthesia Plan  Planned anesthetic: general endotracheal  GETA - glidescope  Decadron 10, Zofran 4 for antiemesis  Toradol 15mg at case closure if cleared by surgeon   ASA 3   Induction: intravenous   Anesthetic plan and risks discussed with: patient  Anesthesia plan special considerations: antiemetics,   Post-op plan: routine recovery

## 2021-06-02 NOTE — ANESTHESIA POSTPROCEDURE EVALUATION
Patient: Lavelle Montana  LAPAROSCOPY BILATERAL OVARIAN CYSTECTOMIES, BEATA LAPAROSCOPY BILATERAL OVARIAN CYSTECTOMIES extensive adhesion lysis, HYSTEROSCOPY  Anesthesia type: general    Patient location: PACU  Last vitals:   Vitals Value Taken Time   /68 10/30/2019  2:09 PM   Temp 36.8  C (98.2  F) 10/30/2019  1:42 PM   Pulse 69 10/30/2019  2:09 PM   Resp 16 10/30/2019  2:09 PM   SpO2 97 % 10/30/2019  2:09 PM     Post vital signs: stable  Level of consciousness: awake and responds to simple questions  Post-anesthesia pain: pain controlled  Post-anesthesia nausea and vomiting: no  Pulmonary: unassisted, return to baseline  Cardiovascular: stable and blood pressure at baseline  Hydration: adequate  Anesthetic events: no    QCDR Measures:  ASA# 11 - Diann-op Cardiac Arrest: ASA11B - Patient did NOT experience unanticipated cardiac arrest  ASA# 12 - Diann-op Mortality Rate: ASA12B - Patient did NOT die  ASA# 13 - PACU Re-Intubation Rate: ASA13B - Patient did NOT require a new airway mgmt  ASA# 10 - Composite Anes Safety: ASA10A - No serious adverse event    Additional Notes:

## 2021-06-02 NOTE — ANESTHESIA CARE TRANSFER NOTE
Last vitals:   Vitals:    10/30/19 1229   BP: 123/74   Pulse: 81   Resp: 17   Temp: 36.7  C (98.1  F)   SpO2: 100%     Patient's level of consciousness is drowsy  Spontaneous respirations: yes  Maintains airway independently: yes  Dentition unchanged: yes  Oropharynx: oropharynx clear of all foreign objects    QCDR Measures:  ASA# 20 - Surgical Safety Checklist: WHO surgical safety checklist completed prior to induction    PQRS# 430 - Adult PONV Prevention: 4558F - Pt received => 2 anti-emetic agents (different classes) preop & intraop  ASA# 8 - Peds PONV Prevention: NA - Not pediatric patient, not GA or 2 or more risk factors NOT present  PQRS# 424 - Diann-op Temp Management: 4559F - At least one body temp DOCUMENTED => 35.5C or 95.9F within required timeframe  PQRS# 426 - PACU Transfer Protocol: - Transfer of care checklist used  ASA# 14 - Acute Post-op Pain: ASA14B - Patient did NOT experience pain >= 7 out of 10

## 2021-06-03 VITALS — WEIGHT: 153 LBS | HEIGHT: 60 IN | BODY MASS INDEX: 30.04 KG/M2

## 2021-06-04 VITALS — WEIGHT: 150 LBS | BODY MASS INDEX: 29.45 KG/M2 | HEIGHT: 60 IN

## 2021-06-04 NOTE — ANESTHESIA POSTPROCEDURE EVALUATION
Patient: Lavelle Montana  HYSTEROSCOPY ULTRASOUND MANUAL DILATION OF CERVIX  Anesthesia type: MAC    Patient location: Phase II Recovery  Last vitals:   Vitals Value Taken Time   BP 99/64 12/19/2019 10:00 AM   Temp  1   Pulse 59 12/19/2019 10:09 AM   Resp     SpO2 98 % 12/19/2019 10:09 AM   Vitals shown include unvalidated device data.  Post vital signs: stable  Level of consciousness: awake and responds to simple questions  Post-anesthesia pain: pain controlled  Post-anesthesia nausea and vomiting: no  Pulmonary: unassisted, return to baseline  Cardiovascular: stable and blood pressure at baseline  Hydration: adequate  Anesthetic events: no    QCDR Measures:  ASA# 11 - Diann-op Cardiac Arrest: ASA11B - Patient did NOT experience unanticipated cardiac arrest  ASA# 12 - Diann-op Mortality Rate: ASA12B - Patient did NOT die  ASA# 13 - PACU Re-Intubation Rate: NA - No ETT / LMA used for case  ASA# 10 - Composite Anes Safety: ASA10A - No serious adverse event    Additional Notes:

## 2021-06-04 NOTE — ANESTHESIA PREPROCEDURE EVALUATION
Anesthesia Evaluation      Patient summary reviewed   No history of anesthetic complications     Airway   Mallampati: III  Neck ROM: full  Comment: Short TM distance    Pulmonary - negative ROS    breath sounds clear to auscultation                         Cardiovascular   Exercise tolerance: > or = 4 METS  Rhythm: regular  Rate: normal,         Neuro/Psych - negative ROS     Endo/Other - negative ROS   (+) obesity,      GI/Hepatic/Renal - negative ROS     Comments: R ovarian endometrioma, s/f laparoscopic bilateral ovarian cystectomies           Dental - normal exam     Comment: Permanent upper and lower retainers                          Anesthesia Plan  Planned anesthetic: MAC    ASA 2   Induction: intravenous   Anesthetic plan and risks discussed with: patient and spouse  Anesthesia plan special considerations: antiemetics,   Post-op plan: routine recovery

## 2021-06-04 NOTE — ANESTHESIA CARE TRANSFER NOTE
Last vitals:   Vitals:    12/19/19 0931   BP: 105/70   Pulse: 82   Resp: 16   Temp: 36.1  C (97  F)   SpO2: 97%     Patient's level of consciousness is drowsy  Spontaneous respirations: yes  Maintains airway independently: yes  Dentition unchanged: yes  Oropharynx: oropharynx clear of all foreign objects    QCDR Measures:  ASA# 20 - Surgical Safety Checklist: WHO surgical safety checklist completed prior to induction    PQRS# 430 - Adult PONV Prevention: 4558F - Pt received => 2 anti-emetic agents (different classes) preop & intraop  ASA# 8 - Peds PONV Prevention: NA - Not pediatric patient, not GA or 2 or more risk factors NOT present  PQRS# 424 - Diann-op Temp Management: NA - MAC anesthesia or case < 60 minutes  PQRS# 426 - PACU Transfer Protocol: - Transfer of care checklist used  ASA# 14 - Acute Post-op Pain: ASA14B - Patient did NOT experience pain >= 7 out of 10

## 2021-07-03 NOTE — ADDENDUM NOTE
Addendum Note by Palmira Finch MD at 1/2/2020  4:33 AM     Author: Palmira Finch MD Service: -- Author Type: Physician    Filed: 1/2/2020  4:33 AM Date of Service: 1/2/2020  4:33 AM Status: Signed    : Palmira Finch MD (Physician)       Addendum  created 01/02/20 0433 by Palmira Finch MD    Intraprocedure Event edited, Intraprocedure Staff edited

## 2021-08-18 ENCOUNTER — HOSPITAL ENCOUNTER (OUTPATIENT)
Facility: HOSPITAL | Age: 36
Discharge: HOME | End: 2021-08-18
Attending: OBSTETRICS & GYNECOLOGY | Admitting: OBSTETRICS & GYNECOLOGY
Payer: COMMERCIAL

## 2021-08-18 ENCOUNTER — HOSPITAL ENCOUNTER (OUTPATIENT)
Facility: HOSPITAL | Age: 36
End: 2021-08-18
Admitting: OBSTETRICS & GYNECOLOGY
Payer: COMMERCIAL

## 2021-08-18 VITALS — BODY MASS INDEX: 34.75 KG/M2 | HEIGHT: 60 IN | WEIGHT: 177 LBS

## 2021-08-18 PROBLEM — Z36.89 ENCOUNTER FOR TRIAGE IN PREGNANT PATIENT: Status: ACTIVE | Noted: 2021-08-18

## 2021-08-18 LAB
ALT SERPL W P-5'-P-CCNC: <9 U/L (ref 0–45)
AST SERPL W P-5'-P-CCNC: 26 U/L (ref 0–40)
HOLD SPECIMEN: NORMAL

## 2021-08-18 PROCEDURE — 84460 ALANINE AMINO (ALT) (SGPT): CPT | Performed by: OBSTETRICS & GYNECOLOGY

## 2021-08-18 PROCEDURE — 82239 BILE ACIDS TOTAL: CPT | Performed by: OBSTETRICS & GYNECOLOGY

## 2021-08-18 PROCEDURE — 36415 COLL VENOUS BLD VENIPUNCTURE: CPT | Performed by: OBSTETRICS & GYNECOLOGY

## 2021-08-18 PROCEDURE — 84450 TRANSFERASE (AST) (SGOT): CPT | Performed by: OBSTETRICS & GYNECOLOGY

## 2021-08-18 RX ORDER — LIDOCAINE 40 MG/G
CREAM TOPICAL
Status: DISCONTINUED | OUTPATIENT
Start: 2021-08-18 | End: 2021-08-18 | Stop reason: HOSPADM

## 2021-08-18 ASSESSMENT — MIFFLIN-ST. JEOR: SCORE: 1419.37

## 2021-08-18 NOTE — PROGRESS NOTES
Patient arrives to [facility] with c/o itching ankles. She states that she spoke to the nurse on call at the clinic and they triaged her to [facility].   Patient placed on the monitor, history collected, and outpatient standing orders placed. Fetus is active per patient, and no contractions. She denies LOF, denies bleeding. Call placed to Dr. Ramirez, notified of above information, and plan is to collect LFTs, bile acids, and obtain a reactive NST then send patient home with follow-up in the clinic with pending lab results.

## 2021-08-20 LAB — BILE AC SERPL-SCNC: 2 UMOL/L

## 2021-10-03 ENCOUNTER — HEALTH MAINTENANCE LETTER (OUTPATIENT)
Age: 36
End: 2021-10-03

## 2022-01-23 ENCOUNTER — HEALTH MAINTENANCE LETTER (OUTPATIENT)
Age: 37
End: 2022-01-23

## 2022-08-11 LAB
HEPATITIS B SURFACE ANTIGEN (EXTERNAL): NONREACTIVE
HIV1+2 AB SERPL QL IA: NONREACTIVE
RUBELLA ANTIBODY IGG (EXTERNAL): NORMAL
TREPONEMA PALLIDUM ANTIBODY (EXTERNAL): NONREACTIVE

## 2022-09-10 ENCOUNTER — HEALTH MAINTENANCE LETTER (OUTPATIENT)
Age: 37
End: 2022-09-10

## 2023-02-21 ENCOUNTER — TRANSFERRED RECORDS (OUTPATIENT)
Dept: HEALTH INFORMATION MANAGEMENT | Facility: CLINIC | Age: 38
End: 2023-02-21

## 2023-02-21 LAB — HIV 1&2 EXT: NORMAL

## 2023-02-24 LAB — GROUP B STREPTOCOCCUS (EXTERNAL): POSITIVE

## 2023-03-13 ENCOUNTER — ANESTHESIA EVENT (OUTPATIENT)
Dept: OBGYN | Facility: HOSPITAL | Age: 38
End: 2023-03-13
Payer: COMMERCIAL

## 2023-03-13 NOTE — H&P
OB Admission H&P     Date: (Not on file)  Time: 3:12 PM   Lavelle Montana, : 1985, MRN: [id number]     CC: term IUP: planned repeat  section    HPI: Lavelle Montana is a 37 year old  with  single inter-uterine gestation at Unknown, with JON of Estimated Date of Delivery: 3/21/2023.   She presents to L&D with plan for repeat  section .  Pregnancy has been complicated by   1.AMA  2. +GBS  3. LGA  4. Anemia  5. Prior CS     Patient denies headache, visual changes, RUQ pain. She denies contractions, leakage of fluid, or vaginal bleeding and reports good fetal movement.     OB History   OB History    Para Term  AB Living   2 0 0 0 0 0   SAB IAB Ectopic Multiple Live Births   0 0 0 0 0      # Outcome Date GA Lbr Matias/2nd Weight Sex Delivery Anes PTL Lv   2             1                 Past Medical History:  Past Medical History:   Diagnosis Date     Anxiety      Cervical stenosis (uterine cervix)      Endometriosis      Infertility, female         Past Surgical History:   Past Surgical History:   Procedure Laterality Date     HYSTEROSCOPY       HYSTEROSCOPY DIAGNOSTIC N/A 10/30/2019    Procedure: HYSTEROSCOPY;  Surgeon: Caren Owusu MD;  Location: [facility];  Service: Gynecology     HYSTEROSCOPY DIAGNOSTIC N/A 2019    Procedure: HYSTEROSCOPY ULTRASOUND MANUAL DILATION OF CERVIX;  Surgeon: Caren Owusu MD;  Location: [facility];  Service: Gynecology     LE FORT ONE , OSTEOTOMY SAGITTAL SPLIT, COMBINED  2014    Procedure: COMBINED LE FORT ONE, OSTEOTOMY SAGITTAL SPLIT;  Surgeon: Javi Coello DDS;  Location:  OR     OPERATIVE HYSTEROSCOPY N/A 9/10/2020    Procedure: cervical dilatation AND HYSTEROSCOPY;  Surgeon: Uri Baldwin MD;  Location:  OR     TX LAP,FULGURATE/EXCISE LESIONS Bilateral 10/30/2019    Procedure: LAPAROSCOPY BILATERAL OVARIAN CYSTECTOMIES;  Surgeon: Caren Owusu MD;  Location: [facility]  Main OR;  Service: Gynecology     MD LAP,FULGURATE/EXCISE LESIONS Bilateral 10/30/2019    Procedure: DAVINCI LAPAROSCOPY BILATERAL OVARIAN CYSTECTOMIES extensive adhesion lysis;  Surgeon: Caren Owusu MD;  Location: [facility] OR;  Service: Gynecology        Social History   Reviewed, patient denies smoking, alcohol and drug use  She is  . Father is  involved    Medications  No current facility-administered medications for this encounter.     Current Outpatient Medications   Medication     Prenatal Vit-Fe Fumarate-FA (PRENATAL MULTIVITAMIN W/IRON) 27-0.8 MG tablet       Allergies   Allergies   Allergen Reactions     Amoxicillin Unknown       ROS: otherwise negative except what is stated in HPI.     Physical Exam:   Vitals pending - There were no vitals taken for this visit.   Gen: no acute distress, resting comfortably   CV: acyanotic   Heart: regular rate and rhythm   Pulm: unlabored respirations, clear to ausculation bilaterally    Abd: gravid, soft, nontender   Extremities: soft, nontender   FHR: positive,  reviewed  [place]: reviewed      Pertinent Labs   Rh +  RI  Received tdap    Impression:   single inter uterine pregnancy at term   AMA  Prior cs  Anemia  GBS+  PRIOR HISTORY OF PP hemrorhage    Plan:   Reviewed repeat  section  Reviewed prior history of postpartum hemorrhage with B- Amaral suture   Reviewed risks of recurrence reviewed risks of bleeding reviewed risks of the operation in detail all questions were answered.      Caren Owusu MD

## 2023-03-14 ENCOUNTER — TRANSFERRED RECORDS (OUTPATIENT)
Dept: HEALTH INFORMATION MANAGEMENT | Facility: CLINIC | Age: 38
End: 2023-03-14

## 2023-03-14 ENCOUNTER — ANESTHESIA (OUTPATIENT)
Dept: OBGYN | Facility: HOSPITAL | Age: 38
End: 2023-03-14
Payer: COMMERCIAL

## 2023-03-14 ENCOUNTER — HOSPITAL ENCOUNTER (INPATIENT)
Facility: HOSPITAL | Age: 38
LOS: 2 days | Discharge: HOME | End: 2023-03-16
Attending: OBSTETRICS & GYNECOLOGY | Admitting: OBSTETRICS & GYNECOLOGY
Payer: COMMERCIAL

## 2023-03-14 DIAGNOSIS — Z98.891 S/P C-SECTION: Primary | ICD-10-CM

## 2023-03-14 LAB
ABO/RH(D): NORMAL
ANTIBODY SCREEN: NEGATIVE
BASOPHILS # BLD AUTO: 0.1 10E3/UL (ref 0–0.2)
BASOPHILS NFR BLD AUTO: 1 %
CREAT SERPL-MCNC: 0.56 MG/DL (ref 0.51–0.95)
EOSINOPHIL # BLD AUTO: 0.1 10E3/UL (ref 0–0.7)
EOSINOPHIL NFR BLD AUTO: 1 %
ERYTHROCYTE [DISTWIDTH] IN BLOOD BY AUTOMATED COUNT: 17.6 % (ref 10–15)
GFR SERPL CREATININE-BSD FRML MDRD: >90 ML/MIN/1.73M2
HCT VFR BLD AUTO: 36.4 % (ref 35–47)
HGB BLD-MCNC: 10.5 G/DL (ref 11.7–15.7)
HGB BLD-MCNC: 12.1 G/DL (ref 11.7–15.7)
IMM GRANULOCYTES # BLD: 0.1 10E3/UL
IMM GRANULOCYTES NFR BLD: 2 %
LYMPHOCYTES # BLD AUTO: 1.7 10E3/UL (ref 0.8–5.3)
LYMPHOCYTES NFR BLD AUTO: 22 %
MCH RBC QN AUTO: 30.4 PG (ref 26.5–33)
MCHC RBC AUTO-ENTMCNC: 33.2 G/DL (ref 31.5–36.5)
MCV RBC AUTO: 92 FL (ref 78–100)
MONOCYTES # BLD AUTO: 0.6 10E3/UL (ref 0–1.3)
MONOCYTES NFR BLD AUTO: 8 %
NEUTROPHILS # BLD AUTO: 5.4 10E3/UL (ref 1.6–8.3)
NEUTROPHILS NFR BLD AUTO: 66 %
NRBC # BLD AUTO: 0 10E3/UL
NRBC BLD AUTO-RTO: 0 /100
PLATELET # BLD AUTO: 153 10E3/UL (ref 150–450)
PLATELET # BLD AUTO: 170 10E3/UL (ref 150–450)
RBC # BLD AUTO: 3.98 10E6/UL (ref 3.8–5.2)
SPECIMEN EXPIRATION DATE: NORMAL
T PALLIDUM AB SER QL: NONREACTIVE
WBC # BLD AUTO: 7.8 10E3/UL (ref 4–11)

## 2023-03-14 PROCEDURE — 250N000011 HC RX IP 250 OP 636: Performed by: NURSE PRACTITIONER

## 2023-03-14 PROCEDURE — 258N000003 HC RX IP 258 OP 636: Performed by: NURSE PRACTITIONER

## 2023-03-14 PROCEDURE — 85025 COMPLETE CBC W/AUTO DIFF WBC: CPT | Performed by: OBSTETRICS & GYNECOLOGY

## 2023-03-14 PROCEDURE — 250N000009 HC RX 250: Performed by: NURSE PRACTITIONER

## 2023-03-14 PROCEDURE — 258N000003 HC RX IP 258 OP 636: Performed by: NURSE ANESTHETIST, CERTIFIED REGISTERED

## 2023-03-14 PROCEDURE — 999N000248 HC STATISTIC IV INSERT WITH US BY RN

## 2023-03-14 PROCEDURE — 85049 AUTOMATED PLATELET COUNT: CPT | Performed by: OBSTETRICS & GYNECOLOGY

## 2023-03-14 PROCEDURE — 86901 BLOOD TYPING SEROLOGIC RH(D): CPT | Performed by: NURSE PRACTITIONER

## 2023-03-14 PROCEDURE — C9290 INJ, BUPIVACAINE LIPOSOME: HCPCS | Performed by: ANESTHESIOLOGY

## 2023-03-14 PROCEDURE — 370N000017 HC ANESTHESIA TECHNICAL FEE, PER MIN: Performed by: OBSTETRICS & GYNECOLOGY

## 2023-03-14 PROCEDURE — 250N000013 HC RX MED GY IP 250 OP 250 PS 637: Performed by: OBSTETRICS & GYNECOLOGY

## 2023-03-14 PROCEDURE — 96372 THER/PROPH/DIAG INJ SC/IM: CPT | Performed by: NURSE PRACTITIONER

## 2023-03-14 PROCEDURE — 250N000013 HC RX MED GY IP 250 OP 250 PS 637: Performed by: NURSE PRACTITIONER

## 2023-03-14 PROCEDURE — 250N000011 HC RX IP 250 OP 636: Performed by: ANESTHESIOLOGY

## 2023-03-14 PROCEDURE — 86780 TREPONEMA PALLIDUM: CPT | Performed by: NURSE PRACTITIONER

## 2023-03-14 PROCEDURE — 250N000011 HC RX IP 250 OP 636: Performed by: OBSTETRICS & GYNECOLOGY

## 2023-03-14 PROCEDURE — 86850 RBC ANTIBODY SCREEN: CPT | Performed by: NURSE PRACTITIONER

## 2023-03-14 PROCEDURE — 258N000003 HC RX IP 258 OP 636: Performed by: ANESTHESIOLOGY

## 2023-03-14 PROCEDURE — 82565 ASSAY OF CREATININE: CPT | Performed by: OBSTETRICS & GYNECOLOGY

## 2023-03-14 PROCEDURE — 250N000009 HC RX 250: Performed by: NURSE ANESTHETIST, CERTIFIED REGISTERED

## 2023-03-14 PROCEDURE — 258N000003 HC RX IP 258 OP 636: Performed by: OBSTETRICS & GYNECOLOGY

## 2023-03-14 PROCEDURE — 36415 COLL VENOUS BLD VENIPUNCTURE: CPT | Performed by: NURSE PRACTITIONER

## 2023-03-14 PROCEDURE — 120N000001 HC R&B MED SURG/OB

## 2023-03-14 PROCEDURE — 272N000001 HC OR GENERAL SUPPLY STERILE: Performed by: OBSTETRICS & GYNECOLOGY

## 2023-03-14 PROCEDURE — 250N000011 HC RX IP 250 OP 636: Performed by: NURSE ANESTHETIST, CERTIFIED REGISTERED

## 2023-03-14 PROCEDURE — 85018 HEMOGLOBIN: CPT | Performed by: OBSTETRICS & GYNECOLOGY

## 2023-03-14 PROCEDURE — 36415 COLL VENOUS BLD VENIPUNCTURE: CPT | Performed by: OBSTETRICS & GYNECOLOGY

## 2023-03-14 PROCEDURE — 999N000249 HC STATISTIC C-SECTION ON UNIT

## 2023-03-14 PROCEDURE — 360N000076 HC SURGERY LEVEL 3, PER MIN: Performed by: OBSTETRICS & GYNECOLOGY

## 2023-03-14 RX ORDER — FENTANYL CITRATE 50 UG/ML
50 INJECTION, SOLUTION INTRAMUSCULAR; INTRAVENOUS EVERY 5 MIN PRN
Status: DISCONTINUED | OUTPATIENT
Start: 2023-03-14 | End: 2023-03-16 | Stop reason: HOSPADM

## 2023-03-14 RX ORDER — MORPHINE SULFATE 1 MG/ML
INJECTION, SOLUTION EPIDURAL; INTRATHECAL; INTRAVENOUS
Status: COMPLETED | OUTPATIENT
Start: 2023-03-14 | End: 2023-03-14

## 2023-03-14 RX ORDER — METOCLOPRAMIDE HYDROCHLORIDE 5 MG/ML
10 INJECTION INTRAMUSCULAR; INTRAVENOUS EVERY 6 HOURS PRN
Status: DISCONTINUED | OUTPATIENT
Start: 2023-03-14 | End: 2023-03-16 | Stop reason: HOSPADM

## 2023-03-14 RX ORDER — MISOPROSTOL 200 UG/1
400 TABLET ORAL
Status: DISCONTINUED | OUTPATIENT
Start: 2023-03-14 | End: 2023-03-14 | Stop reason: HOSPADM

## 2023-03-14 RX ORDER — FENTANYL CITRATE 50 UG/ML
25 INJECTION, SOLUTION INTRAMUSCULAR; INTRAVENOUS EVERY 5 MIN PRN
Status: DISCONTINUED | OUTPATIENT
Start: 2023-03-14 | End: 2023-03-16 | Stop reason: HOSPADM

## 2023-03-14 RX ORDER — MISOPROSTOL 200 UG/1
800 TABLET ORAL
Status: DISCONTINUED | OUTPATIENT
Start: 2023-03-14 | End: 2023-03-14 | Stop reason: HOSPADM

## 2023-03-14 RX ORDER — CLINDAMYCIN PHOSPHATE 900 MG/50ML
900 INJECTION, SOLUTION INTRAVENOUS
Status: COMPLETED | OUTPATIENT
Start: 2023-03-14 | End: 2023-03-14

## 2023-03-14 RX ORDER — NALOXONE HYDROCHLORIDE 0.4 MG/ML
0.4 INJECTION, SOLUTION INTRAMUSCULAR; INTRAVENOUS; SUBCUTANEOUS
Status: DISCONTINUED | OUTPATIENT
Start: 2023-03-14 | End: 2023-03-16 | Stop reason: HOSPADM

## 2023-03-14 RX ORDER — ACETAMINOPHEN 325 MG/1
975 TABLET ORAL ONCE
Status: COMPLETED | OUTPATIENT
Start: 2023-03-14 | End: 2023-03-14

## 2023-03-14 RX ORDER — BUPIVACAINE HYDROCHLORIDE 7.5 MG/ML
INJECTION, SOLUTION INTRASPINAL
Status: COMPLETED | OUTPATIENT
Start: 2023-03-14 | End: 2023-03-14

## 2023-03-14 RX ORDER — BUPIVACAINE HYDROCHLORIDE 2.5 MG/ML
INJECTION, SOLUTION EPIDURAL; INFILTRATION; INTRACAUDAL
Status: COMPLETED | OUTPATIENT
Start: 2023-03-14 | End: 2023-03-14

## 2023-03-14 RX ORDER — HYDROMORPHONE HYDROCHLORIDE 1 MG/ML
0.2 INJECTION, SOLUTION INTRAMUSCULAR; INTRAVENOUS; SUBCUTANEOUS EVERY 5 MIN PRN
Status: DISCONTINUED | OUTPATIENT
Start: 2023-03-14 | End: 2023-03-16 | Stop reason: HOSPADM

## 2023-03-14 RX ORDER — ONDANSETRON 4 MG/1
4 TABLET, ORALLY DISINTEGRATING ORAL EVERY 6 HOURS PRN
Status: DISCONTINUED | OUTPATIENT
Start: 2023-03-14 | End: 2023-03-16 | Stop reason: HOSPADM

## 2023-03-14 RX ORDER — METOCLOPRAMIDE 10 MG/1
10 TABLET ORAL EVERY 6 HOURS PRN
Status: DISCONTINUED | OUTPATIENT
Start: 2023-03-14 | End: 2023-03-16 | Stop reason: HOSPADM

## 2023-03-14 RX ORDER — PROCHLORPERAZINE MALEATE 10 MG
10 TABLET ORAL EVERY 6 HOURS PRN
Status: DISCONTINUED | OUTPATIENT
Start: 2023-03-14 | End: 2023-03-16 | Stop reason: HOSPADM

## 2023-03-14 RX ORDER — OXYCODONE HYDROCHLORIDE 5 MG/1
5 TABLET ORAL EVERY 4 HOURS PRN
Status: DISCONTINUED | OUTPATIENT
Start: 2023-03-14 | End: 2023-03-16 | Stop reason: HOSPADM

## 2023-03-14 RX ORDER — SODIUM CHLORIDE, SODIUM LACTATE, POTASSIUM CHLORIDE, CALCIUM CHLORIDE 600; 310; 30; 20 MG/100ML; MG/100ML; MG/100ML; MG/100ML
INJECTION, SOLUTION INTRAVENOUS CONTINUOUS
Status: DISCONTINUED | OUTPATIENT
Start: 2023-03-14 | End: 2023-03-14 | Stop reason: HOSPADM

## 2023-03-14 RX ORDER — CARBOPROST TROMETHAMINE 250 UG/ML
250 INJECTION, SOLUTION INTRAMUSCULAR
Status: DISCONTINUED | OUTPATIENT
Start: 2023-03-14 | End: 2023-03-14 | Stop reason: HOSPADM

## 2023-03-14 RX ORDER — CARBOPROST TROMETHAMINE 250 UG/ML
250 INJECTION, SOLUTION INTRAMUSCULAR
Status: DISCONTINUED | OUTPATIENT
Start: 2023-03-14 | End: 2023-03-16 | Stop reason: HOSPADM

## 2023-03-14 RX ORDER — LIDOCAINE 40 MG/G
CREAM TOPICAL
Status: DISCONTINUED | OUTPATIENT
Start: 2023-03-14 | End: 2023-03-16 | Stop reason: HOSPADM

## 2023-03-14 RX ORDER — CITRIC ACID/SODIUM CITRATE 334-500MG
30 SOLUTION, ORAL ORAL
Status: COMPLETED | OUTPATIENT
Start: 2023-03-14 | End: 2023-03-14

## 2023-03-14 RX ORDER — ENOXAPARIN SODIUM 100 MG/ML
40 INJECTION SUBCUTANEOUS EVERY 24 HOURS
Status: DISCONTINUED | OUTPATIENT
Start: 2023-03-15 | End: 2023-03-16 | Stop reason: HOSPADM

## 2023-03-14 RX ORDER — MODIFIED LANOLIN
OINTMENT (GRAM) TOPICAL
Status: DISCONTINUED | OUTPATIENT
Start: 2023-03-14 | End: 2023-03-16 | Stop reason: HOSPADM

## 2023-03-14 RX ORDER — MORPHINE SULFATE 0.5 MG/ML
150 INJECTION, SOLUTION EPIDURAL; INTRATHECAL; INTRAVENOUS ONCE
Status: DISCONTINUED | OUTPATIENT
Start: 2023-03-15 | End: 2023-03-14 | Stop reason: HOSPADM

## 2023-03-14 RX ORDER — OXYTOCIN 10 [USP'U]/ML
10 INJECTION, SOLUTION INTRAMUSCULAR; INTRAVENOUS
Status: DISCONTINUED | OUTPATIENT
Start: 2023-03-14 | End: 2023-03-16 | Stop reason: HOSPADM

## 2023-03-14 RX ORDER — MISOPROSTOL 200 UG/1
800 TABLET ORAL
Status: DISCONTINUED | OUTPATIENT
Start: 2023-03-14 | End: 2023-03-16 | Stop reason: HOSPADM

## 2023-03-14 RX ORDER — OXYTOCIN 10 [USP'U]/ML
10 INJECTION, SOLUTION INTRAMUSCULAR; INTRAVENOUS
Status: COMPLETED | OUTPATIENT
Start: 2023-03-14 | End: 2023-03-14

## 2023-03-14 RX ORDER — HYDROMORPHONE HYDROCHLORIDE 1 MG/ML
0.4 INJECTION, SOLUTION INTRAMUSCULAR; INTRAVENOUS; SUBCUTANEOUS EVERY 5 MIN PRN
Status: DISCONTINUED | OUTPATIENT
Start: 2023-03-14 | End: 2023-03-16 | Stop reason: HOSPADM

## 2023-03-14 RX ORDER — BISACODYL 10 MG
10 SUPPOSITORY, RECTAL RECTAL DAILY PRN
Status: DISCONTINUED | OUTPATIENT
Start: 2023-03-16 | End: 2023-03-16 | Stop reason: HOSPADM

## 2023-03-14 RX ORDER — LIDOCAINE 40 MG/G
CREAM TOPICAL
Status: DISCONTINUED | OUTPATIENT
Start: 2023-03-14 | End: 2023-03-14 | Stop reason: HOSPADM

## 2023-03-14 RX ORDER — ONDANSETRON 4 MG/1
4 TABLET, ORALLY DISINTEGRATING ORAL EVERY 30 MIN PRN
Status: DISCONTINUED | OUTPATIENT
Start: 2023-03-14 | End: 2023-03-16 | Stop reason: HOSPADM

## 2023-03-14 RX ORDER — IBUPROFEN 800 MG/1
800 TABLET, FILM COATED ORAL EVERY 6 HOURS
Status: DISCONTINUED | OUTPATIENT
Start: 2023-03-15 | End: 2023-03-16 | Stop reason: HOSPADM

## 2023-03-14 RX ORDER — OXYTOCIN/0.9 % SODIUM CHLORIDE 30/500 ML
340 PLASTIC BAG, INJECTION (ML) INTRAVENOUS CONTINUOUS PRN
Status: DISCONTINUED | OUTPATIENT
Start: 2023-03-14 | End: 2023-03-14 | Stop reason: HOSPADM

## 2023-03-14 RX ORDER — ACETAMINOPHEN 325 MG/1
975 TABLET ORAL EVERY 6 HOURS
Status: DISCONTINUED | OUTPATIENT
Start: 2023-03-14 | End: 2023-03-16 | Stop reason: HOSPADM

## 2023-03-14 RX ORDER — METHYLERGONOVINE MALEATE 0.2 MG/ML
200 INJECTION INTRAVENOUS
Status: DISCONTINUED | OUTPATIENT
Start: 2023-03-14 | End: 2023-03-14 | Stop reason: HOSPADM

## 2023-03-14 RX ORDER — ONDANSETRON 2 MG/ML
4 INJECTION INTRAMUSCULAR; INTRAVENOUS EVERY 6 HOURS PRN
Status: DISCONTINUED | OUTPATIENT
Start: 2023-03-14 | End: 2023-03-16 | Stop reason: HOSPADM

## 2023-03-14 RX ORDER — OXYTOCIN/0.9 % SODIUM CHLORIDE 30/500 ML
100-340 PLASTIC BAG, INJECTION (ML) INTRAVENOUS CONTINUOUS PRN
Status: DISCONTINUED | OUTPATIENT
Start: 2023-03-14 | End: 2023-03-16 | Stop reason: HOSPADM

## 2023-03-14 RX ORDER — NALOXONE HYDROCHLORIDE 0.4 MG/ML
0.2 INJECTION, SOLUTION INTRAMUSCULAR; INTRAVENOUS; SUBCUTANEOUS
Status: DISCONTINUED | OUTPATIENT
Start: 2023-03-14 | End: 2023-03-16 | Stop reason: HOSPADM

## 2023-03-14 RX ORDER — KETOROLAC TROMETHAMINE 30 MG/ML
30 INJECTION, SOLUTION INTRAMUSCULAR; INTRAVENOUS EVERY 6 HOURS
Status: COMPLETED | OUTPATIENT
Start: 2023-03-14 | End: 2023-03-15

## 2023-03-14 RX ORDER — MISOPROSTOL 200 UG/1
400 TABLET ORAL
Status: DISCONTINUED | OUTPATIENT
Start: 2023-03-14 | End: 2023-03-16 | Stop reason: HOSPADM

## 2023-03-14 RX ORDER — OXYTOCIN/0.9 % SODIUM CHLORIDE 30/500 ML
340 PLASTIC BAG, INJECTION (ML) INTRAVENOUS CONTINUOUS PRN
Status: DISCONTINUED | OUTPATIENT
Start: 2023-03-14 | End: 2023-03-16 | Stop reason: HOSPADM

## 2023-03-14 RX ORDER — AMOXICILLIN 250 MG
1 CAPSULE ORAL 2 TIMES DAILY
Status: DISCONTINUED | OUTPATIENT
Start: 2023-03-14 | End: 2023-03-16 | Stop reason: HOSPADM

## 2023-03-14 RX ORDER — SIMETHICONE 80 MG
80 TABLET,CHEWABLE ORAL 4 TIMES DAILY PRN
Status: DISCONTINUED | OUTPATIENT
Start: 2023-03-14 | End: 2023-03-16 | Stop reason: HOSPADM

## 2023-03-14 RX ORDER — METHYLERGONOVINE MALEATE 0.2 MG/ML
200 INJECTION INTRAVENOUS
Status: DISCONTINUED | OUTPATIENT
Start: 2023-03-14 | End: 2023-03-16 | Stop reason: HOSPADM

## 2023-03-14 RX ORDER — SODIUM CHLORIDE, SODIUM LACTATE, POTASSIUM CHLORIDE, CALCIUM CHLORIDE 600; 310; 30; 20 MG/100ML; MG/100ML; MG/100ML; MG/100ML
INJECTION, SOLUTION INTRAVENOUS CONTINUOUS
Status: DISCONTINUED | OUTPATIENT
Start: 2023-03-14 | End: 2023-03-16 | Stop reason: HOSPADM

## 2023-03-14 RX ORDER — DEXTROSE, SODIUM CHLORIDE, SODIUM LACTATE, POTASSIUM CHLORIDE, AND CALCIUM CHLORIDE 5; .6; .31; .03; .02 G/100ML; G/100ML; G/100ML; G/100ML; G/100ML
INJECTION, SOLUTION INTRAVENOUS CONTINUOUS
Status: DISCONTINUED | OUTPATIENT
Start: 2023-03-14 | End: 2023-03-16 | Stop reason: HOSPADM

## 2023-03-14 RX ORDER — AMOXICILLIN 250 MG
2 CAPSULE ORAL 2 TIMES DAILY
Status: DISCONTINUED | OUTPATIENT
Start: 2023-03-14 | End: 2023-03-16 | Stop reason: HOSPADM

## 2023-03-14 RX ORDER — ONDANSETRON 2 MG/ML
INJECTION INTRAMUSCULAR; INTRAVENOUS PRN
Status: DISCONTINUED | OUTPATIENT
Start: 2023-03-14 | End: 2023-03-14

## 2023-03-14 RX ORDER — OXYTOCIN/0.9 % SODIUM CHLORIDE 30/500 ML
PLASTIC BAG, INJECTION (ML) INTRAVENOUS CONTINUOUS PRN
Status: DISCONTINUED | OUTPATIENT
Start: 2023-03-14 | End: 2023-03-14

## 2023-03-14 RX ORDER — ONDANSETRON 2 MG/ML
4 INJECTION INTRAMUSCULAR; INTRAVENOUS EVERY 30 MIN PRN
Status: DISCONTINUED | OUTPATIENT
Start: 2023-03-14 | End: 2023-03-16 | Stop reason: HOSPADM

## 2023-03-14 RX ORDER — PROCHLORPERAZINE 25 MG
25 SUPPOSITORY, RECTAL RECTAL EVERY 12 HOURS PRN
Status: DISCONTINUED | OUTPATIENT
Start: 2023-03-14 | End: 2023-03-16 | Stop reason: HOSPADM

## 2023-03-14 RX ORDER — HYDROCORTISONE 25 MG/G
CREAM TOPICAL 3 TIMES DAILY PRN
Status: DISCONTINUED | OUTPATIENT
Start: 2023-03-14 | End: 2023-03-16 | Stop reason: HOSPADM

## 2023-03-14 RX ADMIN — SODIUM CHLORIDE, POTASSIUM CHLORIDE, SODIUM LACTATE AND CALCIUM CHLORIDE: 600; 310; 30; 20 INJECTION, SOLUTION INTRAVENOUS at 19:54

## 2023-03-14 RX ADMIN — PHENYLEPHRINE HYDROCHLORIDE 0.3 MCG/KG/MIN: 10 INJECTION INTRAVENOUS at 15:26

## 2023-03-14 RX ADMIN — SODIUM CHLORIDE, POTASSIUM CHLORIDE, SODIUM LACTATE AND CALCIUM CHLORIDE: 600; 310; 30; 20 INJECTION, SOLUTION INTRAVENOUS at 15:47

## 2023-03-14 RX ADMIN — PHENYLEPHRINE HYDROCHLORIDE 200 MCG: 10 INJECTION INTRAVENOUS at 15:34

## 2023-03-14 RX ADMIN — SENNOSIDES AND DOCUSATE SODIUM 1 TABLET: 50; 8.6 TABLET ORAL at 22:27

## 2023-03-14 RX ADMIN — CLINDAMYCIN PHOSPHATE 900 MG: 900 INJECTION, SOLUTION INTRAVENOUS at 15:23

## 2023-03-14 RX ADMIN — ONDANSETRON 4 MG: 2 INJECTION INTRAMUSCULAR; INTRAVENOUS at 19:54

## 2023-03-14 RX ADMIN — Medication 300 ML/HR: at 15:50

## 2023-03-14 RX ADMIN — BUPIVACAINE HYDROCHLORIDE 20 ML: 2.5 INJECTION, SOLUTION EPIDURAL; INFILTRATION; INTRACAUDAL at 16:46

## 2023-03-14 RX ADMIN — TRANEXAMIC ACID 1 G: 1 INJECTION, SOLUTION INTRAVENOUS at 15:22

## 2023-03-14 RX ADMIN — GENTAMICIN SULFATE 120 MG: 40 INJECTION, SOLUTION INTRAMUSCULAR; INTRAVENOUS at 14:22

## 2023-03-14 RX ADMIN — ONDANSETRON 4 MG: 2 INJECTION INTRAMUSCULAR; INTRAVENOUS at 15:23

## 2023-03-14 RX ADMIN — METHYLERGONOVINE MALEATE 200 MCG: 0.2 INJECTION INTRAVENOUS at 15:58

## 2023-03-14 RX ADMIN — BUPIVACAINE 20 ML: 13.3 INJECTION, SUSPENSION, LIPOSOMAL INFILTRATION at 16:46

## 2023-03-14 RX ADMIN — ACETAMINOPHEN 975 MG: 325 TABLET ORAL at 22:28

## 2023-03-14 RX ADMIN — SODIUM CITRATE AND CITRIC ACID MONOHYDRATE 30 ML: 500; 334 SOLUTION ORAL at 14:59

## 2023-03-14 RX ADMIN — BUPIVACAINE HYDROCHLORIDE IN DEXTROSE 1.5 ML: 7.5 INJECTION, SOLUTION SUBARACHNOID at 15:28

## 2023-03-14 RX ADMIN — SODIUM CHLORIDE, POTASSIUM CHLORIDE, SODIUM LACTATE AND CALCIUM CHLORIDE: 600; 310; 30; 20 INJECTION, SOLUTION INTRAVENOUS at 06:37

## 2023-03-14 RX ADMIN — Medication 0.15 MG: at 15:28

## 2023-03-14 RX ADMIN — CLINDAMYCIN PHOSPHATE 900 MG: 900 INJECTION, SOLUTION INTRAVENOUS at 19:06

## 2023-03-14 RX ADMIN — ACETAMINOPHEN 975 MG: 325 TABLET ORAL at 14:58

## 2023-03-14 ASSESSMENT — ACTIVITIES OF DAILY LIVING (ADL)
FALL_HISTORY_WITHIN_LAST_SIX_MONTHS: NO
ADLS_ACUITY_SCORE: 20
TOILETING_ISSUES: NO
ADLS_ACUITY_SCORE: 20
CONCENTRATING,_REMEMBERING_OR_MAKING_DECISIONS_DIFFICULTY: NO
ADLS_ACUITY_SCORE: 20
ADLS_ACUITY_SCORE: 20
WALKING_OR_CLIMBING_STAIRS_DIFFICULTY: NO
CHANGE_IN_FUNCTIONAL_STATUS_SINCE_ONSET_OF_CURRENT_ILLNESS/INJURY: NO
ADLS_ACUITY_SCORE: 20
ADLS_ACUITY_SCORE: 25
DRESSING/BATHING_DIFFICULTY: NO
WEAR_GLASSES_OR_BLIND: YES
ADLS_ACUITY_SCORE: 20
DIFFICULTY_EATING/SWALLOWING: NO
ADLS_ACUITY_SCORE: 20
VISION_MANAGEMENT: CONTACTS IN
DOING_ERRANDS_INDEPENDENTLY_DIFFICULTY: NO
ADLS_ACUITY_SCORE: 20

## 2023-03-14 NOTE — OP NOTE
NAME:  Lavelle Montana     RECORD # [id number]     ADMIT DATE: 3/14/2023     DATE OF SERVICE: 3/14/2023     PREOPERATIVE DIAGNOSIS: Term intrauterine pregnancy with planned repeat  section    PROCEDURE: Repeat low transverse  section      SURGEON:  Caren Owusu MD     ASSISTANT: Elinor jefferson    ANESTHESIA: spinal    ESTIMATED BLOOD LOSS: 1200    DRAINS: Her catheter.    COMPLICATIONS: Uterine atony which was controlled with uterine massage, IM Pitocin x2, IV Pitocin and 0.2 mg Methergine    FINDINGS: Normal uterus, tubes and ovaries bilateral. Normal appearance to the adnexae.  Live male infant born with Apgars of 8 at one minute, 9 at 5 minutes,  weight unavailable at time of dictation.    There were dense adhesions to the anterior abdominal wall.  Bladder adhesions to the lower uterine segment as well.    PROCEDURE:  Patient was met preoperatively where we discussed the procedure and the risks associated with the procedure.  She understood these to include but not limited to injury to adjacent organs including bowel, bladder, ureter, infection and bleeding. Understanding these risks her consents were signed.      She was brought to the operating room in stable condition.  After induction of a spinal anesthetic, fetal heart tones were checked and were stable. She was carefully prepped and draped in sterile fashion for the procedure.  A timeout was then performed.      A Pfannenstiel skin incision was made and carried down to the rectus fascia which was incised in the midline and carried out bilaterally.  The superior and inferior aspects of the rectus fascia were elevated up and the underlying rectus muscles dissected off with sharp and blunt techniques.  The rectus muscles were now  at the midline and the peritoneum was identified and entered sharply.  This incision was then extended.  A bladder blade was introduced. The vesicouterine peritoneum was identified and a bladder flap was  created.  A low transverse uterine incision was made revealing clear amniotic fluid.  The baby's head was then delivered. There was a spontaneous cry and therefore bulb suction was performed. The remainder of the infant easily delivered. The cord was clamped x 2 and cut and the infant handed off to waiting nursing personnel.    The placenta was then manually removed from the uterus.  The uterus was exteriorized, covered with a moist laparotomy sponge and cleared of all clots and debris.  The uterine incision was now closed with 0 Vicryl from both angles in a running locking suture.  A second imbricating suture of 0  vicryl was used for hemostasis.  A figure-of-eight suture was placed in the left angle and also the inferior angle.    The uterus was returned to the abdominopelvic cavity.  The pericolic gutters were cleared of all clots and debris.  The uterine incision was again inspected and noted to be hemostatic.   Surgicel was placed on the uterine incision   the peritoneum was now closed with 3-0 vicryl suture superiorly to inferiorly.  The rectus muscles were made hemostatic with the use of electrocautery.  Surgicel powder was placed on the rectus muscles.     The fascia was brought together with PDS loop. The subcutaneous tissues were irrigated, made hemostatic with use of electrocautery and brought together with 3-0 plain.  Skin was closed with 4-0 Monocryl.  Glue was applied..  Patient tolerated this procedure well.  Sponge, lap and needle counts were correct x two.

## 2023-03-14 NOTE — PLAN OF CARE
Problem: Plan of Care - These are the overarching goals to be used throughout the patient stay.    Goal: Optimal Comfort and Wellbeing  Outcome: Progressing  Intervention: Provide Person-Centered Care  Recent Flowsheet Documentation  Taken 3/14/2023 0800 by Blossom Hernández RN  Trust Relationship/Rapport:   care explained   choices provided   emotional support provided   empathic listening provided   questions answered   questions encouraged   reassurance provided   thoughts/feelings acknowledged   Patient denies any pain

## 2023-03-14 NOTE — ANESTHESIA PROCEDURE NOTES
"Intrathecal injection Procedure Note    Pre-Procedure   Staff -        Anesthesiologist:  Néstor Maier MD       Performed By: anesthesiologist       Location: OR       Procedure Start/Stop Times: 3/14/2023 3:24 PM and 3/14/2023 3:28 PM       Pre-Anesthestic Checklist: patient identified, IV checked, risks and benefits discussed, informed consent, monitors and equipment checked, pre-op evaluation, at physician/surgeon's request and post-op pain management  Timeout:       Correct Patient: Yes        Correct Procedure: Yes        Correct Site: Yes        Correct Position: Yes   Procedure Documentation  Procedure: intrathecal injection       Patient Position: sitting       Patient Prep/Sterile Barriers: sterile gloves, mask, patient draped       Skin prep: Chloraprep       Insertion Site: L2-3. (midline approach).       Needle Gauge: 24.        Needle Length (Inches): 4        Spinal Needle Type: Pencan       Introducer used       Introducer: 20 G       # of attempts: 2 and  # of redirects:  0    Assessment/Narrative         Paresthesias: No.       Sensory Level: T4       CSF fluid: clear.    Medication(s) Administered   0.75% Hyperbaric Bupivacaine (Intrathecal) - Intrathecal   1.5 mL - 3/14/2023 3:28:00 PM  Morphine PF 1 mg/mL (Intrathecal) - Intrathecal   0.15 mg - 3/14/2023 3:28:00 PM  Medication Administration Time: 3/14/2023 3:24 PM     Comments:  Attempted at L3-4 but unsuccessful due to patient positioning. Patient felt minor discomfort near tail bone that completley resolved with spinal needle removal.     After reposition single pass spinal at level L2-3 . Aspiration of CSF pre and post medication injection. No pain or paresthesias upon placement. No complications.         FOR [facility] ([facility] ONLY:   Pain Team Contact information: please page the Pain Team Via [employer]. Search \"Pain\". During daytime hours, please page the attending first. At night please page the resident first.    "

## 2023-03-14 NOTE — PROGRESS NOTES
Dr. Owusu here to sign consent forms for repeat  section. Lab is just drawing blood and  Is wanting to go to OR as soon as possible. Labs pending, will give TXA when ready and gentamycin/clindamycin ordered

## 2023-03-14 NOTE — ANESTHESIA PREPROCEDURE EVALUATION
Anesthesia Pre-Procedure Evaluation    Patient: Lavelle Montana   MRN: [id number] : 1985        Procedure : Procedure(s):  REPEAT  SECTION          Past Medical History:   Diagnosis Date     Anxiety      Cervical stenosis (uterine cervix)      Endometriosis      Infertility, female       Past Surgical History:   Procedure Laterality Date     HYSTEROSCOPY       HYSTEROSCOPY DIAGNOSTIC N/A 10/30/2019    Procedure: HYSTEROSCOPY;  Surgeon: Caren Owusu MD;  Location: [facility];  Service: Gynecology     HYSTEROSCOPY DIAGNOSTIC N/A 2019    Procedure: HYSTEROSCOPY ULTRASOUND MANUAL DILATION OF CERVIX;  Surgeon: Caren Owusu MD;  Location: [facility];  Service: Gynecology     LE FORT ONE , OSTEOTOMY SAGITTAL SPLIT, COMBINED  2014    Procedure: COMBINED LE FORT ONE, OSTEOTOMY SAGITTAL SPLIT;  Surgeon: Javi Coello DDS;  Location: [facility]     OPERATIVE HYSTEROSCOPY N/A 9/10/2020    Procedure: cervical dilatation AND HYSTEROSCOPY;  Surgeon: Uri Baldwin MD;  Location:  OR     TX LAP,FULGURATE/EXCISE LESIONS Bilateral 10/30/2019    Procedure: LAPAROSCOPY BILATERAL OVARIAN CYSTECTOMIES;  Surgeon: Caren Owusu MD;  Location: [facility];  Service: Gynecology     TX LAP,FULGURATE/EXCISE LESIONS Bilateral 10/30/2019    Procedure: DAVINCI LAPAROSCOPY BILATERAL OVARIAN CYSTECTOMIES extensive adhesion lysis;  Surgeon: Caren Owusu MD;  Location: [facility];  Service: Gynecology      Allergies   Allergen Reactions     Amoxicillin Itching and Unknown      Social History     Tobacco Use     Smoking status: Never     Smokeless tobacco: Never   Substance Use Topics     Alcohol use: Not Currently     Comment: on weekends      Wt Readings from Last 1 Encounters:   23 84.8 kg (187 lb)        Anesthesia Evaluation   Pt has had prior anesthetic. Type: Regional.    No history of anesthetic complications       ROS/MED HX  ENT/Pulmonary:  - neg  pulmonary ROS     Neurologic:  - neg neurologic ROS     Cardiovascular:  - neg cardiovascular ROS     METS/Exercise Tolerance:     Hematologic:  - neg hematologic  ROS     Musculoskeletal:       GI/Hepatic:  - neg GI/hepatic ROS     Renal/Genitourinary:       Endo:     (+) Obesity,     Psychiatric/Substance Use:     (+) psychiatric history anxiety     Infectious Disease:       Malignancy:       Other:            Physical Exam    Airway        Mallampati: III   TM distance: > 3 FB   Neck ROM: full   Mouth opening: > 3 cm    Respiratory Devices and Support         Dental  no notable dental history         Cardiovascular   cardiovascular exam normal          Pulmonary   pulmonary exam normal                OUTSIDE LABS:  CBC: No results found for: WBC, HGB, HCT, PLT  BMP: No results found for: NA, POTASSIUM, CHLORIDE, CO2, BUN, CR, GLC  COAGS: No results found for: PTT, INR, FIBR  POC:   Lab Results   Component Value Date    HCG Negative 06/25/2014     HEPATIC:   Lab Results   Component Value Date    ALT <9 08/18/2021    AST 26 08/18/2021     OTHER: No results found for: PH, LACT, A1C, JOHN, PHOS, MAG, LIPASE, AMYLASE, TSH, T4, T3, CRP, SED    Anesthesia Plan    ASA Status:  2      Anesthesia Type: Spinal.              Consents    Anesthesia Plan(s) and associated risks, benefits, and realistic alternatives discussed. Questions answered and patient/representative(s) expressed understanding.    - Discussed:     - Discussed with:  Patient         Postoperative Care            Comments:    Other Comments: Chart reviewed, including labs. I reviewed the options and risks with the patient of a spinal, including but not limited to: bleeding, infection, damage to tissues under the skin (nerves, muscles, bloodvessels), hypotension and headache.  The patient's questions were answered and the consent was signed. Patient consented to post op TAP blocks.        neg OB ROS.       SANTANA RHODES MD

## 2023-03-14 NOTE — ANESTHESIA CARE TRANSFER NOTE
Patient: Lavelle Montana    Procedure: Procedure(s):  REPEAT  SECTION       Diagnosis: Previous  delivery affecting pregnancy [O34.219]  Diagnosis Additional Information: No value filed.    Anesthesia Type:   Spinal     Note:    Oropharynx: spontaneously breathing  Level of Consciousness: awake  Oxygen Supplementation: room air    Independent Airway: airway patency satisfactory and stable  Dentition: dentition unchanged  Vital Signs Stable: post-procedure vital signs reviewed and stable  Report to RN Given: handoff report given  Patient transferred to: Labor and Delivery  Comments: Pt. Pink and breathing spontaneously.  Vitals stable.  Report given to oncoming nurse.  Transfer of care occurred.   Handoff Report: Identifed the Patient, Identified the Reponsible Provider, Reviewed the pertinent medical history, Discussed the surgical course, Reviewed Intra-OP anesthesia mangement and issues during anesthesia, Set expectations for post-procedure period and Allowed opportunity for questions and acknowledgement of understanding      Vitals:  Vitals Value Taken Time   /71 23 1704   Temp 98.2    Pulse 82    Resp 12    SpO2 99 % 23 1702   Vitals shown include unvalidated device data.    Electronically Signed By: RM Lane CRNA  2023  5:05 PM

## 2023-03-14 NOTE — ANESTHESIA PROCEDURE NOTES
TAP Procedure Note    Pre-Procedure   Staff -        Anesthesiologist:  Néstor Maier MD       Performed By: anesthesiologist       Location: pre-op       Procedure Start/Stop Times: 3/14/2023 4:40 PM and 3/14/2023 4:46 PM       Pre-Anesthestic Checklist: patient identified, IV checked, site marked, risks and benefits discussed, informed consent, monitors and equipment checked, pre-op evaluation, at physician/surgeon's request and post-op pain management  Timeout:       Correct Patient: Yes        Correct Procedure: Yes        Correct Site: Yes        Correct Position: Yes        Correct Laterality: Yes        Site Marked: Yes  Procedure Documentation  Procedure: TAP       Laterality: bilateral       Patient Position: supine       Patient Prep/Sterile Barriers: sterile gloves, mask       Skin prep: Chloraprep       Needle Type: insulated       Needle Gauge: 20.        Needle Length (Inches): 4        Ultrasound guided       1. Ultrasound was used to identify targeted nerve, plexus, vascular marker, or fascial plane and place a needle adjacent to it in real-time.       2. Ultrasound was used to visualize the spread of anesthetic in close proximity to the above referenced structure.       3. A permanent image is entered into the patient's record.       4. The visualized anatomic structures appeared normal.       5. There were no apparent abnormal pathologic findings.    Assessment/Narrative         The placement was negative for: blood aspirated, painful injection and site bleeding       Paresthesias: No.       Bolus given via needle..        Secured via.        Insertion/Infusion Method: Single Shot       Complications: none       Injection made incrementally with aspirations every 5 mL.    Medication(s) Administered   Bupivacaine 0.25% PF (Infiltration) - Infiltration   20 mL - 3/14/2023 4:46:00 PM  Bupivacaine liposome (Exparel) 1.3% LA inj susp (Infiltration) - Infiltration   20 mL - 3/14/2023 4:46:00  "PM  Medication Administration Time: 3/14/2023 4:40 PM     Comments:  Negative aspiration every 5 ml of medication administered. No signs of LAST. No complications.     10 ml of 0.25% bupivacaine and 10 ml of exparel injected bilaterally.       FOR [facility] ([facility]) ONLY:   Pain Team Contact information: please page the Pain Team Via [employer]. Search \"Pain\". During daytime hours, please page the attending first. At night please page the resident first.    "

## 2023-03-15 LAB — HGB BLD-MCNC: 9.4 G/DL (ref 11.7–15.7)

## 2023-03-15 PROCEDURE — 36415 COLL VENOUS BLD VENIPUNCTURE: CPT | Performed by: OBSTETRICS & GYNECOLOGY

## 2023-03-15 PROCEDURE — 120N000001 HC R&B MED SURG/OB

## 2023-03-15 PROCEDURE — 85018 HEMOGLOBIN: CPT | Performed by: OBSTETRICS & GYNECOLOGY

## 2023-03-15 PROCEDURE — 250N000011 HC RX IP 250 OP 636: Performed by: OBSTETRICS & GYNECOLOGY

## 2023-03-15 PROCEDURE — 250N000013 HC RX MED GY IP 250 OP 250 PS 637: Performed by: OBSTETRICS & GYNECOLOGY

## 2023-03-15 RX ADMIN — ACETAMINOPHEN 975 MG: 325 TABLET ORAL at 18:32

## 2023-03-15 RX ADMIN — KETOROLAC TROMETHAMINE 30 MG: 30 INJECTION, SOLUTION INTRAMUSCULAR at 07:06

## 2023-03-15 RX ADMIN — ENOXAPARIN SODIUM 40 MG: 40 INJECTION SUBCUTANEOUS at 07:52

## 2023-03-15 RX ADMIN — ACETAMINOPHEN 975 MG: 325 TABLET ORAL at 11:10

## 2023-03-15 RX ADMIN — ACETAMINOPHEN 975 MG: 325 TABLET ORAL at 04:45

## 2023-03-15 RX ADMIN — KETOROLAC TROMETHAMINE 30 MG: 30 INJECTION, SOLUTION INTRAMUSCULAR at 13:12

## 2023-03-15 RX ADMIN — KETOROLAC TROMETHAMINE 30 MG: 30 INJECTION, SOLUTION INTRAMUSCULAR at 00:57

## 2023-03-15 RX ADMIN — SENNOSIDES AND DOCUSATE SODIUM 1 TABLET: 50; 8.6 TABLET ORAL at 08:03

## 2023-03-15 RX ADMIN — IBUPROFEN 800 MG: 800 TABLET ORAL at 18:31

## 2023-03-15 ASSESSMENT — ACTIVITIES OF DAILY LIVING (ADL)
ADLS_ACUITY_SCORE: 26
ADLS_ACUITY_SCORE: 25
ADLS_ACUITY_SCORE: 21
ADLS_ACUITY_SCORE: 26
ADLS_ACUITY_SCORE: 21
ADLS_ACUITY_SCORE: 26
ADLS_ACUITY_SCORE: 25
ADLS_ACUITY_SCORE: 21
ADLS_ACUITY_SCORE: 21

## 2023-03-15 NOTE — PLAN OF CARE
Problem: Plan of Care - These are the overarching goals to be used throughout the patient stay.    Goal: Plan of Care Review  Description: The Plan of Care Review/Shift note should be completed every shift.  The Outcome Evaluation is a brief statement about your assessment that the patient is improving, declining, or no change.  This information will be displayed automatically on your shift note.  Outcome: Progressing     Problem: Postpartum ( Delivery)  Goal: Hemostasis  Outcome: Progressing  Goal: Optimal Pain Control and Function  Outcome: Progressing  Goal: Effective Oxygenation and Ventilation  Outcome: Progressing  Intervention: Optimize Oxygenation and Ventilation  Recent Flowsheet Documentation  Taken 3/15/2023 0215 by Alexandra Penn, RN  Head of Bed (HOB) Positioning: HOB at 20-30 degrees  Taken 3/14/2023 2315 by Alexandra Penn, RN  Head of Bed (HOB) Positioning: HOB at 20-30 degrees    VSS. Afebrile. Denies pain. Fundus firm, midline at U/U. Lochia scant. No clots noted. Pressure dressing intact. No shadowing noted. Her catheter patent draining clear yellow urine. Catheter remains intact as patient has not been able to ambulate due to dizziness with attempts to do so. Encouraged turning and repositioning and bilateral foot dorsiflexion. No dizziness noted while in bed.  SCD's on. IVF infusing as ordered. Good urinary output. Will continue to monitor and assess.

## 2023-03-15 NOTE — PLAN OF CARE
Problem: Postpartum ( Delivery)  Goal: Successful Maternal Role Transition  Outcome: Progressing  Goal: Hemostasis  Outcome: Progressing  Goal: Effective Bowel Elimination  Outcome: Progressing  Goal: Fluid and Electrolyte Balance  Outcome: Progressing  Goal: Absence of Infection Signs and Symptoms  Outcome: Progressing  Goal: Anesthesia/Sedation Recovery  Outcome: Progressing  Goal: Optimal Pain Control and Function  Outcome: Progressing  Goal: Nausea and Vomiting Relief  Outcome: Progressing  Did have Zofran IV for n/v with adequate relief  Goal: Effective Urinary Elimination  Outcome: Progressing  Goal: Effective Oxygenation and Ventilation  Outcome: Progressing  Pulse oximetry reading remains above 94%     Problem: Plan of Care - These are the overarching goals to be used throughout the patient stay.    Goal: Absence of Hospital-Acquired Illness or Injury  Intervention: Prevent Skin Injury  Recent Flowsheet Documentation  Taken 3/14/2023 2215 by Jessica Roberto RN  Body Position: position changed independently  Taken 3/14/2023 2115 by Jessica Roberto RN  Body Position: position changed independently  Intervention: Prevent and Manage VTE (Venous Thromboembolism) Risk  Recent Flowsheet Documentation  Taken 3/14/2023 2215 by Jessica Roberto RN  VTE Prevention/Management: SCDs (sequential compression devices) on  Taken 3/14/2023 2000 by Jessica Roberto RN  VTE Prevention/Management: SCDs (sequential compression devices) on  Goal: Optimal Comfort and Wellbeing  Intervention: Provide Person-Centered Care  Recent Flowsheet Documentation  Taken 3/14/2023 2000 by Jessica Roberto RN  Trust Relationship/Rapport:   care explained   emotional support provided   choices provided   questions answered

## 2023-03-15 NOTE — PROGRESS NOTES
Patient was assisted up and ambulated in room. Tolerated well. Denied complaints of lightheadedness or dizziness. Her discontinued at 0715 with education.

## 2023-03-15 NOTE — PLAN OF CARE
Problem: Plan of Care   Goal: Plan of Care Review  Outcome: Progressing    VSS, ex hypotensive. BP was 83/46, recheck was 90/51, asymptomatic, provider notified. Fundus firm. Pt reports tolerable pain relief with scheduled Tylenol and Toradol use. Ambulating independently in the room. Voiding adequate amount. Pt reports that she wants to breastfeed but has exclusively formula fed this shift. Pt denied use of hospital pump when offered and was given education on milk supply and promoting milk production and pt verbalized understanding. Pt did attempt to breastfeed at 1350 and infant latched but was too sleepy and did not suck or swallow. Lactation consult placed. Bonding well with baby.

## 2023-03-15 NOTE — PLAN OF CARE
Problem: Postpartum ( Delivery)  Goal: Effective Bowel Elimination  Outcome: Progressing   Patient had second loose stool today. Senna due at 2100 to be held by RN.      Problem: Plan of Care - These are the overarching goals to be used throughout the patient stay.    Goal: Plan of Care Review  Description: The Plan of Care Review/Shift note should be completed every shift.  The Outcome Evaluation is a brief statement about your assessment that the patient is improving, declining, or no change.  This information will be displayed automatically on your shift note.  Outcome: Progressing   Vitals and assessments wnl. Patient fundus is firm and lochia is minimal. Increased pain with activity reported by patient. Patient educated oxycodone is available if needed. Tylenol and ibuprofen given.

## 2023-03-15 NOTE — ANESTHESIA POSTPROCEDURE EVALUATION
Patient: Lavelle Montana    Procedure: Procedure(s):  REPEAT  SECTION       Anesthesia Type:  Spinal    Note:  Disposition: Inpatient   Postop Pain Control: Uneventful            Sign Out: Well controlled pain   PONV: No   Neuro/Psych: Uneventful            Sign Out: Acceptable/Baseline neuro status   Airway/Respiratory: Uneventful            Sign Out: Acceptable/Baseline resp. status   CV/Hemodynamics: Uneventful            Sign Out: Acceptable CV status; No obvious hypovolemia; No obvious fluid overload   Other NRE: NONE   DID A NON-ROUTINE EVENT OCCUR? No    Event details/Postop Comments:  Neuraxial block has worn off, no residual numbness or weakness. Pain controlled. Denies headache or back pain. No further questions or concerns. Instructed that we are available  should she have questions pertaining to her epidural.             Last vitals:  Vitals:    03/15/23 0400 03/15/23 0615 03/15/23 0758   BP:  90/51    Resp: 16 18    Temp:  36.6  C (97.9  F)    SpO2: 96% 97% 96%       Electronically Signed By: Ag Green MD  March 15, 2023  9:06 AM

## 2023-03-15 NOTE — LACTATION NOTE
Lactation consultant to patient room to per lactation consult. Mom states her first child, now 18 months was full term, but in the NICU and she pumped for a short time but didn't breastfeed.     Mom desires to breastfeed, but has only attempted 1x since delivery, has not pumped, and has been supplementing up to 30 ml each feeding.    Reviewed benefit of skin to skin prior to feeding to waken and ready for feeding, importance of feeding baby on early hunger cues, and breastfeeding 8-12 times in 24 hours for adequate infant nutrition and hydration as well as for building an optimal milk supply. Instructed on importance of optimal infant positioning for deep, comfortable latch and effective milk transfer.    Instructed mom to ask for help with breastfeeding tonight, and lactation will return tomorrow am for follow up. Set up pump, instructed on use and care, and mom pumping.    Instructed both parents on paced bottle feeding method and rationale. Both state understanding. Anticipatory guidance given on input/output goals and normal feeding volumes in the  period.     Answered questions and gave encouragement.

## 2023-03-15 NOTE — PROGRESS NOTES
Postpartum Day 1    Patient Name:  Lavelle Montana  :  1985  MRN:  [id number]      Assessment:  Normal postpartum course. Acute post operative blood loss anemia.     Plan:  Continue current care. Declines IV venofer, plan for daily PO iron.     Subjective:  The patient feels well:  Voiding without difficulty, lochia normal, tolerating normal diet, ambulating without difficulty and passing flatus.  Voiding independently without complication. Pain is well controlled with current medications.  The patient has no emotional concerns.  The baby is well and being fed by both breast and bottle.    YOB: 2022   Birth Time: 3:48 PM     Prenatal complications:  1.AMA  2. +GBS  3. LGA  4. Anemia  5. Prior CS    Objective:  BP 90/51 (BP Location: Right arm, Patient Position: Semi-Tuttle's, Cuff Size: Adult Regular)   Temp 97.9  F (36.6  C) (Oral)   Resp 18   Ht 1.524 m (5')   Wt 84.8 kg (187 lb)   SpO2 98%   Breastfeeding Unknown   BMI 36.52 kg/m    Patient Vitals for the past 24 hrs:   BP Temp Temp src Resp SpO2   03/15/23 0911 -- -- -- -- 98 %   03/15/23 0805 -- -- -- -- 97 %   03/15/23 0758 -- -- -- -- 96 %   03/15/23 0615 90/51 97.9  F (36.6  C) Oral 18 97 %   03/15/23 0400 -- -- -- 16 96 %   03/15/23 0300 -- -- -- 16 95 %   03/15/23 0215 91/51 98.4  F (36.9  C) Oral 16 98 %   03/15/23 0100 -- -- -- 16 97 %   03/15/23 0015 106/59 -- -- 18 96 %   23 2315 106/60 -- -- -- 97 %   23 -- -- -- -- 97 %   23 -- -- -- -- 98 %   23 -- -- -- -- 97 %   23 -- -- -- -- 97 %   23 110/63 -- -- -- --   23 -- 97.8  F (36.6  C) Oral -- --   23 -- -- -- -- 97 %   23 -- -- -- -- 97 %   23 112/67 -- -- -- --   23 -- -- -- 20 --   23 -- -- -- -- 94 %   23 -- -- -- -- 96 %   23 -- -- -- -- 95 %   23 -- -- -- -- 96 %   23 106/59 -- -- -- --    03/14/23 2145 -- -- -- -- 94 %   03/14/23 2142 -- -- -- -- 97 %   03/14/23 2137 -- -- -- -- 95 %   03/14/23 2132 -- -- -- -- 95 %   03/14/23 2131 103/58 -- -- -- --   03/14/23 2127 -- -- -- -- 96 %   03/14/23 2122 -- -- -- -- 97 %   03/14/23 2115 106/58 -- -- -- 96 %   03/14/23 2112 -- -- -- -- 97 %   03/14/23 2107 -- -- -- -- 96 %   03/14/23 2100 106/58 -- -- 18 95 %   03/14/23 2057 -- -- -- -- 97 %   03/14/23 2052 -- -- -- -- 96 %   03/14/23 2047 -- -- -- -- 96 %   03/14/23 2046 111/56 -- -- -- --   03/14/23 2042 -- -- -- -- 97 %   03/14/23 2037 -- -- -- -- 98 %   03/14/23 2036 132/60 -- -- -- --   03/14/23 2032 -- -- -- -- 100 %   03/14/23 2027 -- -- -- -- 96 %   03/14/23 2022 -- -- -- -- 98 %   03/14/23 2017 -- -- -- -- 98 %   03/14/23 2016 104/62 -- -- -- --   03/14/23 2012 -- -- -- -- 97 %   03/14/23 2007 -- -- -- -- 98 %   03/14/23 2002 -- -- -- -- 99 %   03/14/23 2001 104/59 -- -- -- --   03/14/23 2000 -- 97.8  F (36.6  C) Oral 18 --   03/14/23 1957 -- -- -- -- 97 %   03/14/23 1952 -- -- -- -- 97 %   03/14/23 1947 114/66 -- -- -- 98 %   03/14/23 1942 -- -- -- -- 98 %   03/14/23 1937 -- -- -- -- 97 %   03/14/23 1932 -- -- -- -- 97 %   03/14/23 1931 101/70 -- -- -- --   03/14/23 1927 -- -- -- -- 97 %   03/14/23 1922 -- -- -- -- 97 %   03/14/23 1917 -- -- -- -- 98 %   03/14/23 1916 102/66 -- -- -- --   03/14/23 1912 -- -- -- -- 98 %   03/14/23 1907 -- -- -- -- 97 %   03/14/23 1902 -- -- -- -- 98 %   03/14/23 1901 99/60 -- -- -- --   03/14/23 1857 -- -- -- -- 98 %   03/14/23 1852 99/61 -- -- -- 98 %   03/14/23 1847 -- -- -- -- 98 %   03/14/23 1842 -- -- -- -- 97 %   03/14/23 1837 -- -- -- -- 98 %   03/14/23 1835 112/60 -- -- 20 --   03/14/23 1832 -- -- -- -- 98 %   03/14/23 1827 -- -- -- -- 98 %   03/14/23 1822 103/57 -- -- 20 99 %   03/14/23 1817 -- -- -- -- 98 %   03/14/23 1812 -- -- -- -- 96 %   03/14/23 1807 -- -- -- -- 98 %   03/14/23 1800 123/75 -- -- 18 98 %   03/14/23 1747 -- -- -- -- 97 %   03/14/23  1746 105/64 -- -- 18 --   03/14/23 1742 -- -- -- -- 97 %   03/14/23 1732 -- -- -- -- 97 %   03/14/23 1731 107/57 -- -- 18 --   03/14/23 1727 -- -- -- -- 98 %   03/14/23 1722 -- -- -- -- 97 %   03/14/23 1716 110/58 -- -- 18 --   03/14/23 1712 -- -- -- -- 97 %   03/14/23 1710 112/73 -- -- -- --   03/14/23 1707 -- -- -- -- 97 %   03/14/23 1704 111/71 -- -- -- --   03/14/23 1702 108/71 98.2  F (36.8  C) Oral 18 99 %   03/14/23 1333 123/76 98.2  F (36.8  C) Oral 18 --       Exam: Patient A&O x 3. No acute distress, breathing unlabored. The amount and color of the lochia is appropriate for the duration of recovery. Uterine fundus is firm at U-1. Urinary output is adequate. The low transverse surgical dressing is clean, dry and intact.    Lab Results   Component Value Date    AS Negative 03/14/2023    HGB 9.4 (L) 03/15/2023       Immunization History   Administered Date(s) Administered     Tdap (Adult) Unspecified Formulation 02/07/2023       Provider: Jacque Ackerman CNM    Date:  3/15/2023  Time:  9:26 AM

## 2023-03-16 VITALS
HEIGHT: 60 IN | HEART RATE: 85 BPM | SYSTOLIC BLOOD PRESSURE: 118 MMHG | TEMPERATURE: 98.3 F | DIASTOLIC BLOOD PRESSURE: 65 MMHG | BODY MASS INDEX: 36.71 KG/M2 | RESPIRATION RATE: 16 BRPM | OXYGEN SATURATION: 96 % | WEIGHT: 187 LBS

## 2023-03-16 PROBLEM — Z98.891 S/P C-SECTION: Status: ACTIVE | Noted: 2023-03-16

## 2023-03-16 PROBLEM — Z36.89 ENCOUNTER FOR TRIAGE IN PREGNANT PATIENT: Status: RESOLVED | Noted: 2021-08-18 | Resolved: 2023-03-16

## 2023-03-16 PROCEDURE — 250N000011 HC RX IP 250 OP 636: Performed by: OBSTETRICS & GYNECOLOGY

## 2023-03-16 PROCEDURE — 250N000013 HC RX MED GY IP 250 OP 250 PS 637: Performed by: OBSTETRICS & GYNECOLOGY

## 2023-03-16 RX ORDER — IBUPROFEN 800 MG/1
800 TABLET, FILM COATED ORAL EVERY 6 HOURS
Qty: 30 TABLET | Refills: 0 | Status: SHIPPED | OUTPATIENT
Start: 2023-03-16

## 2023-03-16 RX ORDER — OXYCODONE HYDROCHLORIDE 5 MG/1
5 TABLET ORAL EVERY 4 HOURS PRN
Qty: 12 TABLET | Refills: 0 | Status: SHIPPED | OUTPATIENT
Start: 2023-03-16 | End: 2023-11-20

## 2023-03-16 RX ORDER — ACETAMINOPHEN 325 MG/1
975 TABLET ORAL EVERY 6 HOURS
COMMUNITY
Start: 2023-03-16

## 2023-03-16 RX ORDER — AMOXICILLIN 250 MG
1-2 CAPSULE ORAL 2 TIMES DAILY PRN
Qty: 30 TABLET | Refills: 0 | Status: SHIPPED | OUTPATIENT
Start: 2023-03-16 | End: 2023-11-20

## 2023-03-16 RX ADMIN — OXYCODONE HYDROCHLORIDE 5 MG: 5 TABLET ORAL at 11:21

## 2023-03-16 RX ADMIN — ENOXAPARIN SODIUM 40 MG: 40 INJECTION SUBCUTANEOUS at 08:20

## 2023-03-16 RX ADMIN — ACETAMINOPHEN 975 MG: 325 TABLET ORAL at 12:15

## 2023-03-16 RX ADMIN — OXYCODONE HYDROCHLORIDE 5 MG: 5 TABLET ORAL at 06:29

## 2023-03-16 RX ADMIN — IBUPROFEN 800 MG: 800 TABLET ORAL at 06:29

## 2023-03-16 RX ADMIN — IBUPROFEN 800 MG: 800 TABLET ORAL at 12:15

## 2023-03-16 RX ADMIN — ACETAMINOPHEN 975 MG: 325 TABLET ORAL at 06:28

## 2023-03-16 RX ADMIN — ACETAMINOPHEN 975 MG: 325 TABLET ORAL at 00:18

## 2023-03-16 RX ADMIN — IBUPROFEN 800 MG: 800 TABLET ORAL at 00:18

## 2023-03-16 ASSESSMENT — ACTIVITIES OF DAILY LIVING (ADL)
ADLS_ACUITY_SCORE: 21

## 2023-03-16 NOTE — PROGRESS NOTES
Postpartum Day 2    Patient Name:  Lavelle Montana  :  1985  MRN:  [id number]      Assessment:  Normal postpartum course. Acute post operative blood loss anemia, asymptomatic.    Plan:  Continue current care. PO iron.  Discharge home today per maternal request.      Subjective:  The patient feels well:  Voiding without difficulty, lochia normal, tolerating normal diet, and passing flatus.  Pain is well controlled with current medications.  The patient has no emotional concerns.  The baby is well.    YOB: 2022     Birth Time: 3:48 PM     Prenatal Complications:   1.AMA  2. +GBS  3. LGA  4. Anemia  5. Prior CS    Objective:  /65 (BP Location: Right arm, Patient Position: Semi-Tuttle's, Cuff Size: Adult Regular)   Pulse 85   Temp 98.3  F (36.8  C) (Oral)   Resp 16   Ht 1.524 m (5')   Wt 84.8 kg (187 lb)   SpO2 96%   Breastfeeding Unknown   BMI 36.52 kg/m    Patient Vitals for the past 24 hrs:   BP Temp Temp src Pulse Resp SpO2   23 0822 118/65 -- -- -- -- --   23 0759 (!) 141/71 98.3  F (36.8  C) Oral 85 16 96 %   03/15/23 2356 115/64 98.3  F (36.8  C) Oral 81 14 99 %   03/15/23 1539 114/59 98.2  F (36.8  C) Oral 79 16 99 %   03/15/23 1419 95/54 98.1  F (36.7  C) Oral -- 16 97 %       Exam: Patient A&O x 3. No acute distress, breathing unlabored. The amount and color of the lochia is appropriate for the duration of recovery. Uterine fundus is firm at U. Urinary output is adequate. Dressing removed, incision C/D/I.  The patient is ambulating well without assistance.  The patient is tolerating a regular diet.    Lab Results   Component Value Date    AS Negative 2023    HGB 9.4 (L) 03/15/2023       Immunization History   Administered Date(s) Administered     Tdap (Adult) Unspecified Formulation 2023         Provider:  RM Gee CNM    Date:  3/16/2023  Time:  1:39 PM

## 2023-03-16 NOTE — PLAN OF CARE
Problem: Plan of Care - These are the overarching goals to be used throughout the patient stay.    Goal: Plan of Care Review  Description: The Plan of Care Review/Shift note should be completed every shift.  The Outcome Evaluation is a brief statement about your assessment that the patient is improving, declining, or no change.  This information will be displayed automatically on your shift note.  Outcome: Met  Flowsheets (Taken 3/16/2023 1348)  Plan of Care Reviewed With:   patient   spouse  Overall Patient Progress: improving   Reviewed discharge plan of care with patient and spouse. Patient verbalized understanding of follow up appointments that she needs to schedule for 2 weeks and 6 weeks. Patient ambulatory at time of discharge. Patient has her prescription to fill for oxycodone if needed. Johana Corado RN on 3/16/2023 at 1:51 PM

## 2023-03-16 NOTE — DISCHARGE SUMMARY
OB Discharge Summary      Date:  3/16/2023    Name:  Lavelle Montana  :  1985  MRN:  [id number]      Admission Date:  3/14/2023  Delivery Date: 3/14/2023   Gestational Age at Delivery:  39w0d  Discharge Date:  3/16/2023    Principal Diagnosis:    Patient Active Problem List   Diagnosis     Other jaw asymmetry     Cervical stenosis (uterine cervix)     H/O  section     S/P          Conditions complicating Pregnancy:   1.AMA  2. +GBS  3. LGA  4. Anemia  5. Prior CS    Procedure(s) Performed:  Repeat     Indication for :  Repeat  Indication for Induction:  n/a     Condition at Discharge:  Stable/well    Discharge Medications:      Review of your medicines      START taking      Dose / Directions   acetaminophen 325 MG tablet  Commonly known as: TYLENOL      Dose: 975 mg  Take 3 tablets (975 mg) by mouth every 6 hours  Refills: 0     ibuprofen 800 MG tablet  Commonly known as: ADVIL/MOTRIN      Dose: 800 mg  Take 1 tablet (800 mg) by mouth every 6 hours  Quantity: 30 tablet  Refills: 0     oxyCODONE 5 MG tablet  Commonly known as: ROXICODONE      Dose: 5 mg  Take 1 tablet (5 mg) by mouth every 4 hours as needed for moderate to severe pain  Quantity: 12 tablet  Refills: 0     senna-docusate 8.6-50 MG tablet  Commonly known as: SENOKOT-S/PERICOLACE      Dose: 1-2 tablet  Take 1-2 tablets by mouth 2 times daily as needed for constipation  Quantity: 30 tablet  Refills: 0        CONTINUE these medicines which have NOT CHANGED      Dose / Directions   prenatal multivitamin w/iron 27-0.8 MG tablet      Dose: 1 tablet  Take 1 tablet by mouth daily  Refills: 0           Where to get your medicines      These medications were sent to [id number] - [place] - [address] AT [facility] [address]  [address] [place] [address]    Phone: [phone]     ibuprofen 800 MG tablet    senna-docusate 8.6-50 MG tablet     Some of these will need a  paper prescription and others can be bought over the counter. Ask your nurse if you have questions.    Bring a paper prescription for each of these medications    oxyCODONE 5 MG tablet  You don't need a prescription for these medications    acetaminophen 325 MG tablet          Discharge Plan:    Follow up with /SANTIM:  At 2 weeks for incision check and 6 weeks for OB post delivery checks with [facility] provider.    Patient Instructions:      Physical activity: Nothing in the vagina for 6 weeks. No lifting >15 lbs. No driving on narcotics.      Diet:  As tolerated.    Medication: As listed above. May alternate ibuprofen and acetaminophen for pain management. Paper script for oxycodone.     Other:      Physician/CNM: RM GeeM    Name:  Lavelle Montana  :  1985  MRN:  [id number]   [Negative] : Heme/Lymph

## 2023-03-16 NOTE — PLAN OF CARE
Ka's VSS. She has been taking scheduled tylenol and ibuprofen throughout the night. At 0700, she requested oxycodone as well for pain rated at a 7. Fundal assessment WNL. She is up and independent in the room. Her and  are attentive to infant cares. Would like to discharge today.     Problem: Plan of Care - These are the overarching goals to be used throughout the patient stay.    Goal: Plan of Care Review  Description: The Plan of Care Review/Shift note should be completed every shift.  The Outcome Evaluation is a brief statement about your assessment that the patient is improving, declining, or no change.  This information will be displayed automatically on your shift note.  Outcome: Progressing  Goal: Optimal Comfort and Wellbeing  Outcome: Progressing  Goal: Readiness for Transition of Care  Outcome: Progressing     Problem: Postpartum ( Delivery)  Goal: Successful Maternal Role Transition  Outcome: Progressing  Goal: Hemostasis  Outcome: Progressing  Goal: Effective Bowel Elimination  Outcome: Progressing  Goal: Optimal Pain Control and Function  Outcome: Progressing

## 2023-03-16 NOTE — PLAN OF CARE
Lavelle's VSS. Fundal assessment WNL. She is up and independent in the room. Her and  are attentive to infant cares.   Problem: Plan of Care - These are the overarching goals to be used throughout the patient stay.    Goal: Plan of Care Review  Description: The Plan of Care Review/Shift note should be completed every shift.  The Outcome Evaluation is a brief statement about your assessment that the patient is improving, declining, or no change.  This information will be displayed automatically on your shift note.  3/16/2023 1137 by Leonie Peace RN  Outcome: Progressing  Flowsheets (Taken 3/16/2023 113)  Plan of Care Reviewed With: patient  3/15/2023 2312 by Leonie Peace RN  Outcome: Progressing  Goal: Optimal Comfort and Wellbeing  Outcome: Progressing  Intervention: Monitor Pain and Promote Comfort  Recent Flowsheet Documentation  Taken 3/16/2023 0759 by Leonie Peace RN  Pain Management Interventions: medication (see MAR)  Taken 3/15/2023 2356 by Leonie Peace RN  Pain Management Interventions: medication (see MAR)  Intervention: Provide Person-Centered Care  Recent Flowsheet Documentation  Taken 3/16/2023 0810 by Leonie Peace RN  Trust Relationship/Rapport:   care explained   choices provided   questions answered   questions encouraged   thoughts/feelings acknowledged  Taken 3/15/2023 2356 by Leonie Peace RN  Trust Relationship/Rapport:   care explained   choices provided   questions answered   questions encouraged   thoughts/feelings acknowledged  Goal: Readiness for Transition of Care  3/16/2023 1137 by Leonie Peace RN  Outcome: Progressing  3/15/2023 2312 by Leonie Peace RN  Outcome: Progressing     Problem: Postpartum ( Delivery)  Goal: Successful Maternal Role Transition  3/16/2023 1137 by Leonie Peace RN  Outcome: Progressing  3/15/2023 2312 by Leonie Peace RN  Outcome: Progressing  Goal: Hemostasis  3/16/2023 113 by Leonie Peace RN  Outcome: Progressing  3/15/2023 2312 by  Leonie Peace, RN  Outcome: Progressing  Goal: Effective Bowel Elimination  Outcome: Progressing  Goal: Optimal Pain Control and Function  Outcome: Progressing  Intervention: Prevent or Manage Pain  Recent Flowsheet Documentation  Taken 3/16/2023 3017 by Leonie Peace, RN  Pain Management Interventions: medication (see MAR)  Taken 3/15/2023 9161 by Leonie Peace, RN  Pain Management Interventions: medication (see MAR)

## 2023-03-16 NOTE — PROGRESS NOTES
"Outreach Nurse Note    Lavelle Zambrano Nan  [id number]  1985    Chart reviewed, discharge follow-up plan discussed with patient, Lavelle Zambrano, and bedside RN, needs assessed. Post-delivery follow-up appointments planned in 2 & 6 weeks at [facility]. Home care nurse visit not ordered; patient declined.    Lavelle Zambrano is reported to have support at home and is ready to discharge today with , \"Dragan Nair\". Outreach nurse will continue to follow and assist with discharge planning as needed. No additional discharge needs reported at this time.                "

## 2023-03-21 ENCOUNTER — TRANSFERRED RECORDS (OUTPATIENT)
Dept: HEALTH INFORMATION MANAGEMENT | Facility: CLINIC | Age: 38
End: 2023-03-21

## 2023-04-30 ENCOUNTER — HEALTH MAINTENANCE LETTER (OUTPATIENT)
Age: 38
End: 2023-04-30

## 2023-11-20 ENCOUNTER — OFFICE VISIT (OUTPATIENT)
Dept: FAMILY MEDICINE | Facility: CLINIC | Age: 38
End: 2023-11-20
Payer: COMMERCIAL

## 2023-11-20 VITALS
TEMPERATURE: 98.9 F | SYSTOLIC BLOOD PRESSURE: 132 MMHG | WEIGHT: 171.4 LBS | HEART RATE: 88 BPM | BODY MASS INDEX: 33.47 KG/M2 | DIASTOLIC BLOOD PRESSURE: 83 MMHG | OXYGEN SATURATION: 98 % | RESPIRATION RATE: 18 BRPM

## 2023-11-20 DIAGNOSIS — J06.9 VIRAL URI: Primary | ICD-10-CM

## 2023-11-20 DIAGNOSIS — R07.0 THROAT PAIN: ICD-10-CM

## 2023-11-20 LAB
DEPRECATED S PYO AG THROAT QL EIA: NEGATIVE
GROUP A STREP BY PCR: NOT DETECTED

## 2023-11-20 PROCEDURE — 87635 SARS-COV-2 COVID-19 AMP PRB: CPT | Performed by: NURSE PRACTITIONER

## 2023-11-20 PROCEDURE — 87651 STREP A DNA AMP PROBE: CPT | Performed by: NURSE PRACTITIONER

## 2023-11-20 PROCEDURE — 99203 OFFICE O/P NEW LOW 30 MIN: CPT | Performed by: NURSE PRACTITIONER

## 2023-11-20 ASSESSMENT — ENCOUNTER SYMPTOMS
VOMITING: 0
COUGH: 1
SORE THROAT: 1
FEVER: 0

## 2023-11-20 NOTE — PATIENT INSTRUCTIONS
You are experiencing common virus symptoms. Viruses take 2-3 weeks to resolve on average.      Try over-the-counter cough and cold medication as needed such as Robitussin, ibuprofen for discomfort.    Recheck if high fevers, shortness of breath or not starting to feel better in about 1 week.      COVID test.  Your results will come to MyChart tomorrow.    Watch MyChart for strep results. If positive, I will send an antibiotic.

## 2023-11-20 NOTE — LETTER
November 20, 2023      Christofer Fernandes  [address] [place] [address]        To Whom It May Concern:    Lavelle Montana, his wife, was seen on 11/20.  Please excuse her 11/21 until  due to family illness.        Sincerely,        Francoise Chavez, CNP

## 2023-11-20 NOTE — PROGRESS NOTES
Assessment & Plan     Viral URI    - Symptomatic COVID-19 Virus (Coronavirus) by PCR Nose    Throat pain    - Streptococcus A Rapid Screen w/Reflex to PCR - Clinic Collect     History, exam, and vital signs consistent with a viral URI.  Afebrile.  No concerning findings on exam today.  Clear lung sounds.    She had a more mild cough starting a week ago.  Most likely became sick with a second illness over the last 3 days.  Due to the degree of throat pain associated with the second illness, did do a to go strep test.  Explained this could be the flu, but there would be no specific treatment other than supportive care.    Advised the following-    Try over-the-counter cough and cold medication as needed such as Robitussin, ibuprofen for discomfort.    Recheck if high fevers, shortness of breath or not starting to feel better in about 1 week.      COVID test.  Your results will come to MyChart tomorrow.    Watch MyChart for strep results. If positive, I will send an antibiotic.  --This was negative.            Return in about 1 week (around 11/27/2023).    Francoise Chavez [facility]    Doe Valderrama is a 38 year old female who presents to clinic today for the following health issues:  Chief Complaint   Patient presents with    Cough     X 1wk (dry cough, over the weekend was coughing up mucus, headaches, no fever, chills at night only, stuffy nose, no chest pain, throat pain     Cough  Associated symptoms include sore throat. Pertinent negatives include no ear pain.       Having cough and ST x 1 week.     Starting to feel worse over the last 2-3 days after hunting with .  isn't sick.  No fevers.  Kids have coughs as well.  Throat pain rated 6/10, new.      Worse bodyaches, chills, runny nose cough.      No missed periods.      Taking Dayquil/Nyquil.          Review of Systems   Constitutional:  Negative for fever.   HENT:  Positive for congestion and sore throat.  Negative for ear pain.    Respiratory:  Positive for cough.    Gastrointestinal:  Negative for vomiting.           Objective    /83   Pulse 88   Temp 98.9  F (37.2  C) (Oral)   Resp 18   Wt 77.7 kg (171 lb 6.4 oz)   LMP 10/30/2023   SpO2 98%   BMI 33.47 kg/m    Physical Exam  Constitutional:       Appearance: Normal appearance.   HENT:      Nose: Congestion and rhinorrhea present.      Right Sinus: No maxillary sinus tenderness or frontal sinus tenderness.      Left Sinus: No maxillary sinus tenderness or frontal sinus tenderness.      Mouth/Throat:      Pharynx: Posterior oropharyngeal erythema present.      Tonsils: No tonsillar exudate. 2+ on the right. 2+ on the left.   Cardiovascular:      Rate and Rhythm: Normal rate and regular rhythm.      Pulses: Normal pulses.      Heart sounds: Normal heart sounds.   Pulmonary:      Effort: Pulmonary effort is normal.      Breath sounds: Normal breath sounds. No wheezing.   Neurological:      Mental Status: She is alert.   Psychiatric:         Mood and Affect: Mood normal.            Results for orders placed or performed in visit on 11/20/23 (from the past 24 hour(s))   Streptococcus A Rapid Screen w/Reflex to PCR - Clinic Collect    Specimen: Throat; Swab   Result Value Ref Range    Group A Strep antigen Negative Negative

## 2023-11-21 LAB — SARS-COV-2 RNA RESP QL NAA+PROBE: NEGATIVE

## 2024-01-19 NOTE — PROVIDER NOTIFICATION
Dr. Owusu notified of lab report that x3 attempts and will attempt a draw again at 1950.   
Notified Jacque Ackerman at 1050 that the pt's blood pressure was 83/46, rechecked 15 minutes later and was 90/51. Asymptomatic. Up ambulating, denies dizziness.   
Initial (On Arrival)

## 2024-03-01 LAB — PAP SMEAR - HIM PATIENT REPORTED: NEGATIVE

## 2024-05-16 ENCOUNTER — OFFICE VISIT (OUTPATIENT)
Dept: FAMILY MEDICINE | Facility: CLINIC | Age: 39
End: 2024-05-16
Payer: COMMERCIAL

## 2024-05-16 VITALS
RESPIRATION RATE: 16 BRPM | WEIGHT: 168.5 LBS | HEART RATE: 90 BPM | SYSTOLIC BLOOD PRESSURE: 129 MMHG | OXYGEN SATURATION: 98 % | HEIGHT: 60 IN | TEMPERATURE: 98 F | DIASTOLIC BLOOD PRESSURE: 87 MMHG | BODY MASS INDEX: 33.08 KG/M2

## 2024-05-16 DIAGNOSIS — Z11.59 NEED FOR HEPATITIS C SCREENING TEST: ICD-10-CM

## 2024-05-16 DIAGNOSIS — Z00.00 ROUTINE GENERAL MEDICAL EXAMINATION AT A HEALTH CARE FACILITY: Primary | ICD-10-CM

## 2024-05-16 DIAGNOSIS — F41.8 SITUATIONAL ANXIETY: ICD-10-CM

## 2024-05-16 DIAGNOSIS — Z13.220 SCREENING FOR HYPERLIPIDEMIA: ICD-10-CM

## 2024-05-16 DIAGNOSIS — R63.5 WEIGHT GAIN: ICD-10-CM

## 2024-05-16 DIAGNOSIS — K43.9 VENTRAL HERNIA WITHOUT OBSTRUCTION OR GANGRENE: ICD-10-CM

## 2024-05-16 PROCEDURE — 82947 ASSAY GLUCOSE BLOOD QUANT: CPT | Performed by: FAMILY MEDICINE

## 2024-05-16 PROCEDURE — 99395 PREV VISIT EST AGE 18-39: CPT | Performed by: FAMILY MEDICINE

## 2024-05-16 PROCEDURE — 86803 HEPATITIS C AB TEST: CPT | Performed by: FAMILY MEDICINE

## 2024-05-16 PROCEDURE — 99214 OFFICE O/P EST MOD 30 MIN: CPT | Mod: 25 | Performed by: FAMILY MEDICINE

## 2024-05-16 PROCEDURE — 84443 ASSAY THYROID STIM HORMONE: CPT | Performed by: FAMILY MEDICINE

## 2024-05-16 PROCEDURE — 36415 COLL VENOUS BLD VENIPUNCTURE: CPT | Performed by: FAMILY MEDICINE

## 2024-05-16 PROCEDURE — 80061 LIPID PANEL: CPT | Performed by: FAMILY MEDICINE

## 2024-05-16 RX ORDER — PROPRANOLOL HYDROCHLORIDE 10 MG/1
10 TABLET ORAL 3 TIMES DAILY PRN
Qty: 30 TABLET | Refills: 2 | Status: SHIPPED | OUTPATIENT
Start: 2024-05-16

## 2024-05-16 SDOH — HEALTH STABILITY: PHYSICAL HEALTH: ON AVERAGE, HOW MANY DAYS PER WEEK DO YOU ENGAGE IN MODERATE TO STRENUOUS EXERCISE (LIKE A BRISK WALK)?: 1 DAY

## 2024-05-16 ASSESSMENT — SOCIAL DETERMINANTS OF HEALTH (SDOH): HOW OFTEN DO YOU GET TOGETHER WITH FRIENDS OR RELATIVES?: ONCE A WEEK

## 2024-05-16 NOTE — PATIENT INSTRUCTIONS
"Preventive Care Advice   This is general advice we often give to help people stay healthy. Your care team may have specific advice just for you. Please talk to your care team about your own preventive care needs.  Lifestyle  Exercise at least 150 minutes each week (30 minutes a day, 5 days a week).  Do muscle strengthening activities 2 days a week. These help control your weight and prevent disease.  No smoking.  Wear sunscreen to prevent skin cancer.  Have your home tested for radon every 2 to 5 years. Radon is a colorless, odorless gas that can harm your lungs. To learn more, go to www.health.[facility].mn. and search for \"Radon in Homes.\"  Keep guns unloaded and locked up in a safe place like a safe or gun vault, or, use a gun lock and hide the keys. Always lock away bullets separately. To learn more, visit [employer].[place].gov and search for \"safe gun storage.\"  Nutrition  Eat 5 or more servings of fruits and vegetables each day.  Try wheat bread, brown rice and whole grain pasta (instead of white bread, rice, and pasta).  Get enough calcium and vitamin D. Check the label on foods and aim for 100% of the RDA (recommended daily allowance).  Regular exams  Have a dental exam and cleaning every 6 months.  See your health care team every year to talk about:  Any changes in your health.  Any medicines your care team has prescribed.  Preventive care, family planning, and ways to prevent chronic diseases.  Shots (vaccines)   HPV shots (up to age 26), if you've never had them before.  Hepatitis B shots (up to age 59), if you've never had them before.  COVID-19 shot: Get this shot when it's due.  Flu shot: Get a flu shot every year.  Tetanus shot: Get a tetanus shot every 10 years.  Pneumococcal, hepatitis A, and RSV shots: Ask your care team if you need these based on your risk.  Shingles shot (for age 50 and up).  General health tests  Diabetes screening:  Starting at age 35, Get screened for diabetes at least every 3 years.  If " you are younger than age 35, ask your care team if you should be screened for diabetes.  Cholesterol test: At age 39, start having a cholesterol test every 5 years, or more often if advised.  Bone density scan (DEXA): At age 50, ask your care team if you should have this scan for osteoporosis (brittle bones).  Hepatitis C: Get tested at least once in your life.  Abdominal aortic aneurysm screening: Talk to your doctor about having this screening if you:  Have ever smoked; and  Are biologically male; and  Are between the ages of 65 and 75.  STIs (sexually transmitted infections)  Before age 24: Ask your care team if you should be screened for STIs.  After age 24: Get screened for STIs if you're at risk. You are at risk for STIs (including HIV) if:  You are sexually active with more than one person.  You don't use condoms every time.  You or a partner was diagnosed with a sexually transmitted infection.  If you are at risk for HIV, ask about PrEP medicine to prevent HIV.  Get tested for HIV at least once in your life, whether you are at risk for HIV or not.  Cancer screening tests  Cervical cancer screening: If you have a cervix, begin getting regular cervical cancer screening tests at age 21. Most people who have regular screenings with normal results can stop after age 65. Talk about this with your provider.  Breast cancer scan (mammogram): If you've ever had breasts, begin having regular mammograms starting at age 40. This is a scan to check for breast cancer.  Colon cancer screening: It is important to start screening for colon cancer at age 45.  Have a colonoscopy test every 10 years (or more often if you're at risk) Or, ask your provider about stool tests like a FIT test every year or Cologuard test every 3 years.  To learn more about your testing options, visit: www.[employer]/371827.pdf.  For help making a decision, visit: [email or web address].  Prostate cancer screening test: If you have a prostate and are age 55  to 69, ask your provider if you would benefit from a yearly prostate cancer screening test.  Lung cancer screening: If you are a current or former smoker age 50 to 80, ask your care team if ongoing lung cancer screenings are right for you.  For informational purposes only. Not to replace the advice of your health care provider. Copyright   2023 [employer]. All rights reserved. Clinically reviewed by the [facility] Transitions Program. [employer] 733573 - REV 04/24.    Learning About Stress  What is stress?     Stress is your body's response to a hard situation. Your body can have a physical, emotional, or mental response. Stress is a fact of life for most people, and it affects everyone differently. What causes stress for you may not be stressful for someone else.  A lot of things can cause stress. You may feel stress when you go on a job interview, take a test, or run a race. This kind of short-term stress is normal and even useful. It can help you if you need to work hard or react quickly. For example, stress can help you finish an important job on time.  Long-term stress is caused by ongoing stressful situations or events. Examples of long-term stress include long-term health problems, ongoing problems at work, or conflicts in your family. Long-term stress can harm your health.  How does stress affect your health?  When you are stressed, your body responds as though you are in danger. It makes hormones that speed up your heart, make you breathe faster, and give you a burst of energy. This is called the fight-or-flight stress response. If the stress is over quickly, your body goes back to normal and no harm is done.  But if stress happens too often or lasts too long, it can have bad effects. Long-term stress can make you more likely to get sick, and it can make symptoms of some diseases worse. If you tense up when you are stressed, you may develop neck, shoulder, or low back pain. Stress is  linked to high blood pressure and heart disease.  Stress also harms your emotional health. It can make you nascimento, tense, or depressed. Your relationships may suffer, and you may not do well at work or school.  What can you do to manage stress?  You can try these things to help manage stress:   Do something active. Exercise or activity can help reduce stress. Walking is a great way to get started. Even everyday activities such as housecleaning or yard work can help.  Try yoga or tawana chi. These techniques combine exercise and meditation. You may need some training at first to learn them.  Do something you enjoy. For example, listen to music or go to a movie. Practice your hobby or do volunteer work.  Meditate. This can help you relax, because you are not worrying about what happened before or what may happen in the future.  Do guided imagery. Imagine yourself in any setting that helps you feel calm. You can use online videos, books, or a teacher to guide you.  Do breathing exercises. For example:  From a standing position, bend forward from the waist with your knees slightly bent. Let your arms dangle close to the floor.  Breathe in slowly and deeply as you return to a standing position. Roll up slowly and lift your head last.  Hold your breath for just a few seconds in the standing position.  Breathe out slowly and bend forward from the waist.  Let your feelings out. Talk, laugh, cry, and express anger when you need to. Talking with supportive friends or family, a counselor, or a regino leader about your feelings is a healthy way to relieve stress. Avoid discussing your feelings with people who make you feel worse.  Write. It may help to write about things that are bothering you. This helps you find out how much stress you feel and what is causing it. When you know this, you can find better ways to cope.  What can you do to prevent stress?  You might try some of these things to help prevent stress:  Manage your time.  "This helps you find time to do the things you want and need to do.  Get enough sleep. Your body recovers from the stresses of the day while you are sleeping.  Get support. Your family, friends, and community can make a difference in how you experience stress.  Limit your news feed. Avoid or limit time on social media or news that may make you feel stressed.  Do something active. Exercise or activity can help reduce stress. Walking is a great way to get started.  Where can you learn more?  Go to https://www.[employer]net/patiented  Enter N032 in the search box to learn more about \"Learning About Stress.\"  Current as of: October 24, 2023               Content Version: 14.0    1551-1223 [employer].   Care instructions adapted under license by your healthcare professional. If you have questions about a medical condition or this instruction, always ask your healthcare professional. [employer] disclaims any warranty or liability for your use of this information.      Substance Use Disorder: Care Instructions  Overview     You can improve your life and health by stopping your use of alcohol or drugs. When you don't drink or use drugs, you may feel and sleep better. You may get along better with your family, friends, and coworkers. There are medicines and programs that can help with substance use disorder.  How can you care for yourself at home?  Here are some ways to help you stay sober and prevent relapse.  If you have been given medicine to help keep you sober or reduce your cravings, be sure to take it exactly as prescribed.  Talk to your doctor about programs that can help you stop using drugs or drinking alcohol.  Do not keep alcohol or drugs in your home.  Plan ahead. Think about what you'll say if other people ask you to drink or use drugs. Try not to spend time with people who drink or use drugs.  Use the time and money spent on drinking or drugs to do something that's important to " you.  Preventing a relapse  Have a plan to deal with relapse. Learn to recognize changes in your thinking that lead you to drink or use drugs. Get help before you start to drink or use drugs again.  Try to stay away from situations, friends, or places that may lead you to drink or use drugs.  If you feel the need to drink alcohol or use drugs again, seek help right away. Call a trusted friend or family member. Some people get support from organizations such as Narcotics Anonymous or [employer] or from treatment facilities.  If you relapse, get help as soon as you can. Some people make a plan with another person that outlines what they want that person to do for them if they relapse. The plan usually includes how to handle the relapse and who to notify in case of relapse.  Don't give up. Remember that a relapse doesn't mean that you have failed. Use the experience to learn the triggers that lead you to drink or use drugs. Then quit again. Recovery is a lifelong process. Many people have several relapses before they are able to quit for good.  Follow-up care is a key part of your treatment and safety. Be sure to make and go to all appointments, and call your doctor if you are having problems. It's also a good idea to know your test results and keep a list of the medicines you take.  When should you call for help?   Call 911  anytime you think you may need emergency care. For example, call if you or someone else:    Has overdosed or has withdrawal signs. Be sure to tell the emergency workers that you are or someone else is using or trying to quit using drugs. Overdose or withdrawal signs may include:  Losing consciousness.  Seizure.  Seeing or hearing things that aren't there (hallucinations).     Is thinking or talking about suicide or harming others.   Where to get help 24 hours a day, 7 days a week   If you or someone you know talks about suicide, self-harm, a mental health crisis, a substance use crisis, or any  "other kind of emotional distress, get help right away. You can:    Call the Suicide and Crisis Lifeline at 988.     Call 7-530-114-KEWJ (1-549.584.6910).     Text HOME to 435067 to access the Crisis Text Line.   Consider saving these numbers in your phone.  Go to [employer] for more information or to chat online.  Call your doctor now or seek immediate medical care if:    You are having withdrawal symptoms. These may include nausea or vomiting, sweating, shakiness, and anxiety.   Watch closely for changes in your health, and be sure to contact your doctor if:    You have a relapse.     You need more help or support to stop.   Where can you learn more?  Go to https://www.[employer].net/patiented  Enter H573 in the search box to learn more about \"Substance Use Disorder: Care Instructions.\"  Current as of: November 15, 2023               Content Version: 14.0    5391-1700 [employer].   Care instructions adapted under license by your healthcare professional. If you have questions about a medical condition or this instruction, always ask your healthcare professional. Health[employer], [employer] disclaims any warranty or liability for your use of this information.      "

## 2024-05-16 NOTE — PROGRESS NOTES
"Preventive Care Visit  [facility] NAKUL Jerry MD, Family Medicine  May 16, 2024      Assessment & Plan     Routine general medical examination at a health care facility: advised her to drink less when she does drink (currently drinks 4-5 when she does drink), and to make sure she is careful of her awareness and make sure she is making safe decisions when drinking this much.   - Glucose  - Lipid panel reflex to direct LDL Fasting    Need for hepatitis C screening test  - Hepatitis C Screen Reflex to HCV RNA Quant and Genotype    Screening for hyperlipidemia: lipids ordered    Situational anxiety: will try propranolol  PRN for times when she feels especially anxious or panicky. She did not feel like fluoxetine worked, though I'm not clear on how long she tried this. It's possible she could benefit from a different selective serotonin reuptake inhibitor but she prefers to try something PRN. Explained some of her feelings of inattention and overwhelm are likely due to having many demands on her (working at home, children). I would be less suspicious that she has ADHD since she was very well-organized before she had kids and when she had a more regular sleep schedule. But we could consider ADHD testing if this does not improve.   - propranolol (INDERAL) 10 MG tablet  Dispense: 30 tablet; Refill: 2    Weight gain: check TSH, as she feels she has been unable to lose weight since her last baby was born just over a year ago  - TSH with free T4 reflex    Ventral hernia without obstruction or gangrene: explained this is large enough that she may need surgery eventually but recommended waiting until after having next child, since she is hoping to conceive again      Patient has been advised of split billing requirements and indicates understanding: Yes        BMI  Estimated body mass index is 32.65 kg/m  as calculated from the following:    Height as of this encounter: 1.53 m (5' 0.24\").    Weight as " "of this encounter: 76.4 kg (168 lb 8 oz).       Counseling  Appropriate preventive services were discussed with this patient, including applicable screening as appropriate for fall prevention, nutrition, physical activity, Tobacco-use cessation, weight loss and cognition.  Checklist reviewing preventive services available has been given to the patient.  Reviewed patient's diet, addressing concerns and/or questions.   She is at risk for lack of exercise and has been provided with information to increase physical activity for the benefit of her well-being.   The patient was instructed to see the dentist every 6 months.       Subjective   Lavelle is a 39 year old, presenting for the following:  Physical        5/16/2024     2:05 PM   Additional Questions   Roomed by Consuelo GRACIA        Health Care Directive  Patient does not have a Health Care Directive or Living Will: Discussed advance care planning with patient; information given to patient to review.    HPI    Having anxiety. Worries a lot, feels forgetful and has a hard time focusing. It is worse before she gets her period. Sometimes misses bills. Worries about her daughter because she has a fused labia. Feels like she doesn't have things \"on point.\" She works from home so feels like the line between work and watching the kids is blurry. She works for [employer]. Before kids, was pretty organized, motivated, was \"on top of my game\" for financial things. Sleep- comes and goes. Some nights tosses and turns, sometimes wakes up to check on kids. Sometimes youngest wakes up and has a bottle at 4 or 5 AM.     She is wondering if she can take a medication for anxiety Fluoxetine didn't work, tried that after last pregnancy for a little while. Doesn't want a daily medication.    Last period was last week.     Thinks she might have diastasis recti. Had children 18 months apart and last baby was 9 lb 6 oz.     Drinks once per week, 4-5 drinks when she does drink. Drinks only " shots.       5/16/2024   General Health   How would you rate your overall physical health? Good   Feel stress (tense, anxious, or unable to sleep) To some extent   (!) STRESS CONCERN      5/16/2024   Nutrition   Three or more servings of calcium each day? (!) NO   Diet: Regular (no restrictions)   How many servings of fruit and vegetables per day? (!) 0-1   How many sweetened beverages each day? 0-1         5/16/2024   Exercise   Days per week of moderate/strenous exercise 1 day   (!) EXERCISE CONCERN      5/16/2024   Social Factors   Frequency of gathering with friends or relatives Once a week   Worry food won't last until get money to buy more No   Food not last or not have enough money for food? No   Do you have housing?  Yes   Are you worried about losing your housing? No   Lack of transportation? No   Unable to get utilities (heat,electricity)? No         5/16/2024   Dental   Dentist two times every year? (!) NO         5/16/2024   TB Screening   Were you born outside of the [place]? Yes         Today's PHQ-2 Score:       5/16/2024     1:54 PM   PHQ-2 ( 1999 [employer])   Q1: Little interest or pleasure in doing things 0   Q2: Feeling down, depressed or hopeless 0   PHQ-2 Score 0   Q1: Little interest or pleasure in doing things Not at all   Q2: Feeling down, depressed or hopeless Not at all   PHQ-2 Score 0           5/16/2024   Substance Use   Alcohol more than 3/day or more than 7/wk No   Do you use any other substances recreationally? (!) ALCOHOL. Once a week, 4-5 shots.      Social History     Tobacco Use    Smoking status: Never     Passive exposure: Current    Smokeless tobacco: Never    Tobacco comments:      smokes    Vaping Use    Vaping status: Never Used   Substance Use Topics    Alcohol use: Not Currently     Comment: on weekends    Drug use: Never             5/16/2024   Breast Cancer Screening   Family history of breast, colon, or ovarian cancer? No / Unknown      Mammogram Screening - Mammogram  "every 1-2 years updated in Health Maintenance based on mutual decision making        5/16/2024   STI Screening   New sexual partner(s) since last STI/HIV test? No     History of abnormal Pap smear: No - age 30- 64 PAP with HPV every 5 years recommended        12/20/2016    12:00 AM   PAP / HPV   PAP-ABSTRACT See Scanned Document           This result is from an external source.           5/16/2024   Contraception/Family Planning   Questions about contraception or family planning No        Reviewed and updated as needed this visit by Provider                      Review of Systems  Constitutional, HEENT, cardiovascular, pulmonary, gi and gu systems are negative, except as otherwise noted.     Objective    Exam  /87   Pulse 90   Temp 98  F (36.7  C) (Oral)   Resp 16   Ht 1.53 m (5' 0.24\")   Wt 76.4 kg (168 lb 8 oz)   LMP 05/14/2024 (Exact Date)   SpO2 98%   BMI 32.65 kg/m     Estimated body mass index is 32.65 kg/m  as calculated from the following:    Height as of this encounter: 1.53 m (5' 0.24\").    Weight as of this encounter: 76.4 kg (168 lb 8 oz).    Physical Exam  GENERAL: alert and no distress  EYES: Eyes grossly normal to inspection, PERRL and conjunctivae and sclerae normal  HENT: ear canals and TM's normal, nose and mouth without ulcers or lesions  NECK: no adenopathy, no asymmetry, masses, or scars  RESP: lungs clear to auscultation - no rales, rhonchi or wheezes  CV: regular rate and rhythm, normal S1 S2, no S3 or S4, no murmur, click or rub, no peripheral edema  ABDOMEN: soft, nontender, without hepatosplenomegaly or masses, bowel sounds normal, and large ventral hernia. Muscles are 4-5 finger widths apart.   MS: no gross musculoskeletal defects noted, no edema  SKIN: no suspicious lesions or rashes  NEURO: Normal strength and tone, mentation intact and speech normal  PSYCH: mentation appears normal, affect normal/bright        Signed Electronically by: Rose Jerry MD    "

## 2024-05-17 LAB
CHOLEST SERPL-MCNC: 221 MG/DL
FASTING STATUS PATIENT QL REPORTED: NO
FASTING STATUS PATIENT QL REPORTED: NO
GLUCOSE SERPL-MCNC: 91 MG/DL (ref 70–99)
HCV AB SERPL QL IA: NONREACTIVE
HDLC SERPL-MCNC: 39 MG/DL
LDLC SERPL CALC-MCNC: 124 MG/DL
NONHDLC SERPL-MCNC: 182 MG/DL
TRIGL SERPL-MCNC: 290 MG/DL
TSH SERPL DL<=0.005 MIU/L-ACNC: 1.76 UIU/ML (ref 0.3–4.2)

## 2024-05-21 PROBLEM — K43.9 VENTRAL HERNIA WITHOUT OBSTRUCTION OR GANGRENE: Status: ACTIVE | Noted: 2024-05-21

## 2025-01-03 ENCOUNTER — MYC MEDICAL ADVICE (OUTPATIENT)
Dept: FAMILY MEDICINE | Facility: CLINIC | Age: 40
End: 2025-01-03
Payer: COMMERCIAL

## 2025-01-06 NOTE — TELEPHONE ENCOUNTER
Writer responded to patient message via [employer].     Collins Cedeno, MSN, RN   [facility]

## 2025-01-21 ENCOUNTER — LAB REQUISITION (OUTPATIENT)
Dept: LAB | Facility: CLINIC | Age: 40
End: 2025-01-21

## 2025-01-21 DIAGNOSIS — Z3A.01 LESS THAN 8 WEEKS GESTATION OF PREGNANCY: ICD-10-CM

## 2025-01-21 PROCEDURE — 87086 URINE CULTURE/COLONY COUNT: CPT | Performed by: NURSE PRACTITIONER

## 2025-01-23 LAB — BACTERIA UR CULT: NORMAL

## 2025-02-18 ENCOUNTER — LAB REQUISITION (OUTPATIENT)
Dept: LAB | Facility: CLINIC | Age: 40
End: 2025-02-18

## 2025-02-18 DIAGNOSIS — Z34.91 ENCOUNTER FOR SUPERVISION OF NORMAL PREGNANCY, UNSPECIFIED, FIRST TRIMESTER: ICD-10-CM

## 2025-02-18 LAB
ABO + RH BLD: NORMAL
BASOPHILS # BLD AUTO: 0 10E3/UL (ref 0–0.2)
BASOPHILS NFR BLD AUTO: 0 %
BLD GP AB SCN SERPL QL: NEGATIVE
EOSINOPHIL # BLD AUTO: 0 10E3/UL (ref 0–0.7)
EOSINOPHIL NFR BLD AUTO: 0 %
ERYTHROCYTE [DISTWIDTH] IN BLOOD BY AUTOMATED COUNT: 13.2 % (ref 10–15)
EST. AVERAGE GLUCOSE BLD GHB EST-MCNC: 111 MG/DL
HBA1C MFR BLD: 5.5 %
HCT VFR BLD AUTO: 39.9 % (ref 35–47)
HCV AB SERPL QL IA: NONREACTIVE
HGB BLD-MCNC: 12.7 G/DL (ref 11.7–15.7)
HIV 1+2 AB+HIV1 P24 AG SERPL QL IA: NONREACTIVE
IMM GRANULOCYTES # BLD: 0.1 10E3/UL
IMM GRANULOCYTES NFR BLD: 1 %
LYMPHOCYTES # BLD AUTO: 1.8 10E3/UL (ref 0.8–5.3)
LYMPHOCYTES NFR BLD AUTO: 17 %
MCH RBC QN AUTO: 30 PG (ref 26.5–33)
MCHC RBC AUTO-ENTMCNC: 31.8 G/DL (ref 31.5–36.5)
MCV RBC AUTO: 94 FL (ref 78–100)
MONOCYTES # BLD AUTO: 0.6 10E3/UL (ref 0–1.3)
MONOCYTES NFR BLD AUTO: 6 %
NEUTROPHILS # BLD AUTO: 7.6 10E3/UL (ref 1.6–8.3)
NEUTROPHILS NFR BLD AUTO: 75 %
NRBC # BLD AUTO: 0 10E3/UL
NRBC BLD AUTO-RTO: 0 /100
PLATELET # BLD AUTO: 244 10E3/UL (ref 150–450)
RBC # BLD AUTO: 4.24 10E6/UL (ref 3.8–5.2)
SPECIMEN EXP DATE BLD: NORMAL
WBC # BLD AUTO: 10.1 10E3/UL (ref 4–11)

## 2025-02-18 PROCEDURE — 86780 TREPONEMA PALLIDUM: CPT | Performed by: OBSTETRICS & GYNECOLOGY

## 2025-02-18 PROCEDURE — 85025 COMPLETE CBC W/AUTO DIFF WBC: CPT | Performed by: OBSTETRICS & GYNECOLOGY

## 2025-02-18 PROCEDURE — 86803 HEPATITIS C AB TEST: CPT | Performed by: OBSTETRICS & GYNECOLOGY

## 2025-02-18 PROCEDURE — 86901 BLOOD TYPING SEROLOGIC RH(D): CPT | Performed by: OBSTETRICS & GYNECOLOGY

## 2025-02-18 PROCEDURE — 86762 RUBELLA ANTIBODY: CPT | Performed by: OBSTETRICS & GYNECOLOGY

## 2025-02-18 PROCEDURE — 87389 HIV-1 AG W/HIV-1&-2 AB AG IA: CPT | Performed by: OBSTETRICS & GYNECOLOGY

## 2025-02-18 PROCEDURE — 83036 HEMOGLOBIN GLYCOSYLATED A1C: CPT | Performed by: OBSTETRICS & GYNECOLOGY

## 2025-02-18 PROCEDURE — 87340 HEPATITIS B SURFACE AG IA: CPT | Performed by: OBSTETRICS & GYNECOLOGY

## 2025-02-19 LAB
HBV SURFACE AG SERPL QL IA: NONREACTIVE
RUBV IGG SERPL QL IA: 0.95 INDEX
RUBV IGG SERPL QL IA: NORMAL
T PALLIDUM AB SER QL: NONREACTIVE

## 2025-04-17 ENCOUNTER — TRANSCRIBE ORDERS (OUTPATIENT)
Dept: MATERNAL FETAL MEDICINE | Facility: CLINIC | Age: 40
End: 2025-04-17
Payer: COMMERCIAL

## 2025-04-17 DIAGNOSIS — O26.90 PREGNANCY RELATED CONDITION, ANTEPARTUM: Primary | ICD-10-CM

## 2025-04-22 ENCOUNTER — PRE VISIT (OUTPATIENT)
Dept: MATERNAL FETAL MEDICINE | Facility: HOSPITAL | Age: 40
End: 2025-04-22
Payer: COMMERCIAL

## 2025-04-24 ENCOUNTER — OFFICE VISIT (OUTPATIENT)
Dept: MATERNAL FETAL MEDICINE | Facility: HOSPITAL | Age: 40
End: 2025-04-24
Attending: OBSTETRICS & GYNECOLOGY
Payer: COMMERCIAL

## 2025-04-24 ENCOUNTER — ANCILLARY PROCEDURE (OUTPATIENT)
Dept: ULTRASOUND IMAGING | Facility: HOSPITAL | Age: 40
End: 2025-04-24
Attending: OBSTETRICS & GYNECOLOGY
Payer: COMMERCIAL

## 2025-04-24 DIAGNOSIS — O28.3 ABNORMAL FETAL ULTRASOUND: Primary | ICD-10-CM

## 2025-04-24 DIAGNOSIS — O26.90 PREGNANCY RELATED CONDITION, ANTEPARTUM: ICD-10-CM

## 2025-04-24 DIAGNOSIS — O28.3 ABNORMAL FETAL ULTRASOUND: ICD-10-CM

## 2025-04-24 PROCEDURE — 99205 OFFICE O/P NEW HI 60 MIN: CPT | Mod: 25 | Performed by: OBSTETRICS & GYNECOLOGY

## 2025-04-24 PROCEDURE — 76820 UMBILICAL ARTERY ECHO: CPT

## 2025-04-24 PROCEDURE — 76820 UMBILICAL ARTERY ECHO: CPT | Mod: 26 | Performed by: OBSTETRICS & GYNECOLOGY

## 2025-04-24 PROCEDURE — 76811 OB US DETAILED SNGL FETUS: CPT | Mod: 26 | Performed by: OBSTETRICS & GYNECOLOGY

## 2025-04-24 NOTE — NURSING NOTE
Lavelle Montana is a  at 20w4d who presents to [facility] for a comprehensive ultrasound. Pt reports positive fetal movement. Pt denies bldg/lof/change in discharge, contractions, headache, vision changes, chest pain/SOB or edema. SBAR given to Dr. Cantor, see note in Epic.      Liseth Gillespie RN

## 2025-05-01 ENCOUNTER — TRANSFERRED RECORDS (OUTPATIENT)
Dept: HEALTH INFORMATION MANAGEMENT | Facility: CLINIC | Age: 40
End: 2025-05-01
Payer: COMMERCIAL

## 2025-05-15 ENCOUNTER — ANCILLARY PROCEDURE (OUTPATIENT)
Dept: ULTRASOUND IMAGING | Facility: HOSPITAL | Age: 40
End: 2025-05-15
Attending: OBSTETRICS & GYNECOLOGY
Payer: COMMERCIAL

## 2025-05-15 ENCOUNTER — OFFICE VISIT (OUTPATIENT)
Dept: MATERNAL FETAL MEDICINE | Facility: HOSPITAL | Age: 40
End: 2025-05-15
Attending: OBSTETRICS & GYNECOLOGY
Payer: COMMERCIAL

## 2025-05-15 DIAGNOSIS — O28.3 ABNORMAL FETAL ULTRASOUND: ICD-10-CM

## 2025-05-15 DIAGNOSIS — O36.5990 PREGNANCY AFFECTED BY FETAL GROWTH RESTRICTION: Primary | ICD-10-CM

## 2025-05-15 PROCEDURE — 99213 OFFICE O/P EST LOW 20 MIN: CPT | Mod: 25 | Performed by: OBSTETRICS & GYNECOLOGY

## 2025-05-15 PROCEDURE — 76820 UMBILICAL ARTERY ECHO: CPT | Mod: 26 | Performed by: OBSTETRICS & GYNECOLOGY

## 2025-05-15 PROCEDURE — 76816 OB US FOLLOW-UP PER FETUS: CPT | Mod: 26 | Performed by: OBSTETRICS & GYNECOLOGY

## 2025-05-15 PROCEDURE — 76820 UMBILICAL ARTERY ECHO: CPT

## 2025-05-15 NOTE — NURSING NOTE
Pt at [facility] for ultrasound.  Pt reports positive fetal movement, denies bleeding, leaking fluid, contractions.  Does report some lower extremity swelling, is checking BP at home.  Most recent 117/70s yesterday.  SBAR given to MD.

## 2025-05-15 NOTE — PROGRESS NOTES
"Please see \"Imaging\" tab under \"Chart Review\" for details of today's visit.    Vielka Porras MD    "

## 2025-05-29 ENCOUNTER — HOSPITAL ENCOUNTER (OUTPATIENT)
Facility: HOSPITAL | Age: 40
Discharge: HOME | End: 2025-05-29
Attending: OBSTETRICS & GYNECOLOGY | Admitting: OBSTETRICS & GYNECOLOGY
Payer: COMMERCIAL

## 2025-05-29 ENCOUNTER — ANCILLARY PROCEDURE (OUTPATIENT)
Dept: ULTRASOUND IMAGING | Facility: HOSPITAL | Age: 40
End: 2025-05-29
Attending: STUDENT IN AN ORGANIZED HEALTH CARE EDUCATION/TRAINING PROGRAM
Payer: COMMERCIAL

## 2025-05-29 VITALS
BODY MASS INDEX: 34.95 KG/M2 | HEART RATE: 105 BPM | RESPIRATION RATE: 20 BRPM | DIASTOLIC BLOOD PRESSURE: 63 MMHG | TEMPERATURE: 98.7 F | OXYGEN SATURATION: 97 % | WEIGHT: 178 LBS | SYSTOLIC BLOOD PRESSURE: 116 MMHG | HEIGHT: 60 IN

## 2025-05-29 DIAGNOSIS — O36.5990 PREGNANCY AFFECTED BY FETAL GROWTH RESTRICTION: ICD-10-CM

## 2025-05-29 PROBLEM — Z36.89 ENCOUNTER FOR TRIAGE IN PREGNANT PATIENT: Status: ACTIVE | Noted: 2025-05-29

## 2025-05-29 PROCEDURE — 59025 FETAL NON-STRESS TEST: CPT | Mod: 26 | Performed by: STUDENT IN AN ORGANIZED HEALTH CARE EDUCATION/TRAINING PROGRAM

## 2025-05-29 PROCEDURE — 76820 UMBILICAL ARTERY ECHO: CPT | Mod: 26 | Performed by: STUDENT IN AN ORGANIZED HEALTH CARE EDUCATION/TRAINING PROGRAM

## 2025-05-29 PROCEDURE — 76815 OB US LIMITED FETUS(S): CPT | Mod: 26 | Performed by: STUDENT IN AN ORGANIZED HEALTH CARE EDUCATION/TRAINING PROGRAM

## 2025-05-29 PROCEDURE — G0463 HOSPITAL OUTPT CLINIC VISIT: HCPCS

## 2025-05-29 PROCEDURE — 76820 UMBILICAL ARTERY ECHO: CPT

## 2025-05-29 PROCEDURE — 59025 FETAL NON-STRESS TEST: CPT | Performed by: OBSTETRICS & GYNECOLOGY

## 2025-05-29 PROCEDURE — 99214 OFFICE O/P EST MOD 30 MIN: CPT | Mod: 25 | Performed by: STUDENT IN AN ORGANIZED HEALTH CARE EDUCATION/TRAINING PROGRAM

## 2025-05-29 RX ORDER — VITAMIN A, VITAMIN C, VITAMIN D-3, VITAMIN E, VITAMIN B-1, VITAMIN B-2, NIACIN, VITAMIN B-6, CALCIUM, IRON, ZINC, COPPER 4000; 120; 400; 22; 1.84; 3; 20; 10; 1; 12; 200; 27; 25; 2 [IU]/1; MG/1; [IU]/1; MG/1; MG/1; MG/1; MG/1; MG/1; MG/1; UG/1; MG/1; MG/1; MG/1; MG/1
1 TABLET ORAL DAILY
COMMUNITY

## 2025-05-29 RX ORDER — LIDOCAINE 40 MG/G
CREAM TOPICAL
Status: DISCONTINUED | OUTPATIENT
Start: 2025-05-29 | End: 2025-05-29 | Stop reason: HOSPADM

## 2025-05-29 ASSESSMENT — ACTIVITIES OF DAILY LIVING (ADL)
ADLS_ACUITY_SCORE: 48

## 2025-05-29 NOTE — PLAN OF CARE
Data: [facility]: 2025  4:25 PM. Patient was sent from [facility] for extended monitoring due to decels heard in the clinic. Patient reports +FM. Patient denies uterine contractions, leaking of vaginal fluid/rupture of membranes, vaginal bleeding, abdominal pain. Patient reports fetal movement is normal. Patient is a 25w4d .  Prenatal record reviewed. Pregnancy has been complicated by FGR.    Vital signs wnl. Support person is not present.     Action: Verbal consent for EFM. Triage assessment completed.     Response: Patient verbalized agreement with plan. Will contact [facility] Dr De La Torre with update and further orders.

## 2025-05-30 NOTE — DISCHARGE INSTRUCTIONS
Continue performing fetal kick counts at least twice daily. Stay well-hydrated by keeping a water-based beverage with you at all times. Aim for around 80oz daily. Empty your bladder every 2-3 hours.    Please call your OB office or the after-hours line for any non-emergent concerns or questions you may have (urinary symptoms, contractions after 37 weeks, etc.). For any concerns you feel require emergency evaluation, vaginal bleeding, leaking of fluid (you think your water may be broken or you are unsure), or decreased fetal movement, report to OB triage department as soon as possible. If you are able, please call ahead to let us know you are on your way - [phone].

## 2025-05-30 NOTE — PROCEDURES
fetal monitoring Procedure note    Date: 2025    Indication: variable decelerations on heart rate tracing spontaneously resolve, not prolonged    Procedure: Fetal non-stress test    Results: appropriate for gestational age    Phoebe Chandler MD  2025 9:07 PM

## 2025-05-30 NOTE — PROGRESS NOTES
Data: Patient assessed in the [facility] for referral for EFM from [facility] clinic after having decels at [facility] appointment this afternoon. Cervical exam deferred. Membranes intact. Contractions are not present. See flowsheets for fetal assessment documentation.     Action: Presumed adequate fetal oxygenation documented. Discharge instructions reviewed. Patient instructed to report change in fetal movement, vaginal leaking of fluid or bleeding, abdominal pain, or any concerns related to the pregnancy to provider/clinic.      Response: Orders to discharge home per Dr. Chandler, OB. Patient verbalized understanding of education and agreement with plan. Discharged to home at 2113.           
patient

## 2025-06-02 ENCOUNTER — ANCILLARY PROCEDURE (OUTPATIENT)
Dept: ULTRASOUND IMAGING | Facility: HOSPITAL | Age: 40
End: 2025-06-02
Attending: OBSTETRICS & GYNECOLOGY
Payer: COMMERCIAL

## 2025-06-02 ENCOUNTER — OFFICE VISIT (OUTPATIENT)
Dept: MATERNAL FETAL MEDICINE | Facility: HOSPITAL | Age: 40
End: 2025-06-02
Attending: OBSTETRICS & GYNECOLOGY
Payer: COMMERCIAL

## 2025-06-02 VITALS — DIASTOLIC BLOOD PRESSURE: 84 MMHG | SYSTOLIC BLOOD PRESSURE: 138 MMHG

## 2025-06-02 DIAGNOSIS — O36.5990 PREGNANCY AFFECTED BY FETAL GROWTH RESTRICTION: ICD-10-CM

## 2025-06-02 PROCEDURE — 76815 OB US LIMITED FETUS(S): CPT | Mod: 26 | Performed by: OBSTETRICS & GYNECOLOGY

## 2025-06-02 PROCEDURE — 76815 OB US LIMITED FETUS(S): CPT

## 2025-06-02 PROCEDURE — 76820 UMBILICAL ARTERY ECHO: CPT | Mod: 26 | Performed by: OBSTETRICS & GYNECOLOGY

## 2025-06-02 PROCEDURE — 59025 FETAL NON-STRESS TEST: CPT

## 2025-06-02 PROCEDURE — 59025 FETAL NON-STRESS TEST: CPT | Mod: 26 | Performed by: OBSTETRICS & GYNECOLOGY

## 2025-06-02 PROCEDURE — 76820 UMBILICAL ARTERY ECHO: CPT

## 2025-06-02 NOTE — PROGRESS NOTES
Please see the imaging tab for details of the ultrasound performed today.    Henny Shaikh MD  Specialist in Maternal-Fetal Medicine

## 2025-06-02 NOTE — NURSING NOTE
Lavelle Montana is a  at 26w1d who presents to [facility] for NST and UAR assessment. Pt reports positive fetal movement. Pt denies bldg/lof/change in discharge, contractions, headache, vision changes, chest pain/SOB or edema. SBAR given to Dr. Shaikh, see note in Epic.     NST Performed due to severe fetal growth restriction.  Dr. Shaikh reviewed efm tracing. See NST/BPP Doc Flowsheet tab.

## 2025-06-04 ENCOUNTER — OFFICE VISIT (OUTPATIENT)
Dept: PEDIATRIC CARDIOLOGY | Facility: CLINIC | Age: 40
End: 2025-06-04
Payer: COMMERCIAL

## 2025-06-04 ENCOUNTER — HOSPITAL ENCOUNTER (OUTPATIENT)
Dept: CARDIOLOGY | Facility: CLINIC | Age: 40
Discharge: HOME | End: 2025-06-04
Attending: OBSTETRICS & GYNECOLOGY
Payer: COMMERCIAL

## 2025-06-04 DIAGNOSIS — O35.BXX0 FETAL VENTRICULAR SEPTAL DEFECT AFFECTING ANTEPARTUM CARE OF MOTHER: Primary | ICD-10-CM

## 2025-06-04 DIAGNOSIS — O35.BXX0 ANOMALY OF HEART OF FETUS AFFECTING PREGNANCY, ANTEPARTUM, SINGLE OR UNSPECIFIED FETUS: Primary | ICD-10-CM

## 2025-06-04 DIAGNOSIS — O28.3 ABNORMAL FETAL ULTRASOUND: ICD-10-CM

## 2025-06-04 PROCEDURE — 76827 ECHO EXAM OF FETAL HEART: CPT

## 2025-06-04 NOTE — LETTER
2025      RE: Lavelle Montana  [address] [place] [address]     Dear Colleague,    Thank you for the opportunity to participate in the care of your patient, Lavelle Montana, at the [facility] at [facility]. Please see a copy of my visit note below.    [facility]   Heart Center Fetal Consult Note    Patient:  Lavelle Montana MRN:  [id number]   YOB: 1985 Age:  40 year old   Date of Visit:  2025 PCP:  Rose Jerry MD     Dear Doctor    I had the pleasure of seeing Lavelle Montana at the [facility] on 2025 in fetal cardiology consultation for fetal echocardiogram results. She presented today accompanied by her partner. As you know, she is a 40 year old  at 26w3d who presented for fetal echocardiogram today because of concern for an inlet VSD on screening ultrasound.    I performed and interpreted the fetal echocardiogram today, which was challenging due to poor windows and fetal position, but demonstrated:  There is a moderate to large perimembranous ventricular septal defect. The aortic valve annulus was not well visualized, unable to rule out aortic valve hypoplasia. Normal right and left ventricular size and function. Fetal heart rate is regular at 141 bpm. No hydrops.      I reviewed today's findings with Lavelle Montana and her partner with the help of a diagram. There is a moderate to large perimembranous outlet VSD. The aortic valve annulus was not well visualized. I cannot rule out a posterior malaligned VSD. I would like to bring the patient back at 32 weeks to more full visualize this defect and the LVOT. If there is concern for narrowing, patient would require delivery at [facility]. Otherwise, should be ok to deliver at [facility]. Parents had appropriate questions which I addressed.      Plan:    1. Follow-up at fetal at 32 weeks  gestation to more fully assess aortic valve and LVOT    Thank you for allowing me to participate in Ka's care. Please do not hesitate to contact me with questions or concerns.    This visit was separate from the performance and interpretation of the ultrasound. The majority of the time (>50%) was spent in counseling and coordination of care. I spent a total of 45 minutes on the day of the visit.    Bola Fuller M.D.  Pediatric Cardiology  [facility] [address] [facility], [place] [address]  Phone [phone]  Fax [phone]      Please do not hesitate to contact me if you have any questions/concerns.     Sincerely,       Ryan Fuller MD

## 2025-06-04 NOTE — PROGRESS NOTES
[facility]   Heart Center Fetal Consult Note    Patient:  Lavelle Montana MRN:  [id number]   YOB: 1985 Age:  40 year old   Date of Visit:  2025 PCP:  Rose Jerry MD     Dear Doctor    I had the pleasure of seeing Lavelle Montana at the [facility] on 2025 in fetal cardiology consultation for fetal echocardiogram results. She presented today accompanied by her partner. As you know, she is a 40 year old  at 26w3d who presented for fetal echocardiogram today because of concern for an inlet VSD on screening ultrasound.    I performed and interpreted the fetal echocardiogram today, which was challenging due to poor windows and fetal position, but demonstrated:  There is a moderate to large perimembranous ventricular septal defect. The aortic valve annulus was not well visualized, unable to rule out aortic valve hypoplasia. Normal right and left ventricular size and function. Fetal heart rate is regular at 141 bpm. No hydrops.      I reviewed today's findings with Lavelle Montana and her partner with the help of a diagram. There is a moderate to large perimembranous outlet VSD. The aortic valve annulus was not well visualized. I cannot rule out a posterior malaligned VSD. I would like to bring the patient back at 32 weeks to more full visualize this defect and the LVOT. If there is concern for narrowing, patient would require delivery at [facility]. Otherwise, should be ok to deliver at [facility]. Parents had appropriate questions which I addressed.      Plan:    1. Follow-up at fetal at 32 weeks gestation to more fully assess aortic valve and LVOT    Thank you for allowing me to participate in Lavelle's care. Please do not hesitate to contact me with questions or concerns.    This visit was separate from the performance and interpretation of the ultrasound. The majority of the time (>50%) was spent in counseling and coordination of care. I spent a  total of 45 minutes on the day of the visit.    Bola Fuller M.D.  Pediatric Cardiology  [facility] [address] Office [address] [facility], [place] [address]  Phone [phone]  Fax [phone]

## 2025-06-09 ENCOUNTER — HOSPITAL ENCOUNTER (OUTPATIENT)
Facility: HOSPITAL | Age: 40
Discharge: HOME | End: 2025-06-09
Attending: OBSTETRICS & GYNECOLOGY | Admitting: OBSTETRICS & GYNECOLOGY
Payer: COMMERCIAL

## 2025-06-09 ENCOUNTER — ANCILLARY PROCEDURE (OUTPATIENT)
Dept: ULTRASOUND IMAGING | Facility: HOSPITAL | Age: 40
End: 2025-06-09
Attending: STUDENT IN AN ORGANIZED HEALTH CARE EDUCATION/TRAINING PROGRAM
Payer: COMMERCIAL

## 2025-06-09 ENCOUNTER — OFFICE VISIT (OUTPATIENT)
Dept: MATERNAL FETAL MEDICINE | Facility: HOSPITAL | Age: 40
End: 2025-06-09
Attending: STUDENT IN AN ORGANIZED HEALTH CARE EDUCATION/TRAINING PROGRAM
Payer: COMMERCIAL

## 2025-06-09 VITALS
DIASTOLIC BLOOD PRESSURE: 70 MMHG | TEMPERATURE: 97.8 F | RESPIRATION RATE: 18 BRPM | SYSTOLIC BLOOD PRESSURE: 116 MMHG | OXYGEN SATURATION: 97 %

## 2025-06-09 VITALS — HEART RATE: 84 BPM | SYSTOLIC BLOOD PRESSURE: 125 MMHG | DIASTOLIC BLOOD PRESSURE: 81 MMHG

## 2025-06-09 DIAGNOSIS — O36.5990 PREGNANCY AFFECTED BY FETAL GROWTH RESTRICTION: ICD-10-CM

## 2025-06-09 DIAGNOSIS — O36.5990 PREGNANCY AFFECTED BY FETAL GROWTH RESTRICTION: Primary | ICD-10-CM

## 2025-06-09 DIAGNOSIS — O35.9XX0 SUSPECTED FETAL ANOMALY, ANTEPARTUM, SINGLE OR UNSPECIFIED FETUS: ICD-10-CM

## 2025-06-09 PROCEDURE — 76820 UMBILICAL ARTERY ECHO: CPT

## 2025-06-09 PROCEDURE — G0463 HOSPITAL OUTPT CLINIC VISIT: HCPCS

## 2025-06-09 PROCEDURE — 59025 FETAL NON-STRESS TEST: CPT

## 2025-06-09 RX ORDER — LIDOCAINE 40 MG/G
CREAM TOPICAL
Status: DISCONTINUED | OUTPATIENT
Start: 2025-06-09 | End: 2025-06-09 | Stop reason: HOSPADM

## 2025-06-09 ASSESSMENT — ACTIVITIES OF DAILY LIVING (ADL)
ADLS_ACUITY_SCORE: 22

## 2025-06-09 NOTE — PROCEDURES
NST Procedure note    Date: 6/9/2025    Indication: Fetal growth restriction    Procedure: Fetal non-stress test    Results: Reactive    Supriya Bradshaw MD  6/9/2025 4:33 PM

## 2025-06-09 NOTE — PROGRESS NOTES
The patient was seen for an ultrasound in the [facility] clinic today.  For a detailed report of the ultrasound examination, please see the ultrasound report which can be found under the imaging tab.    If you have questions regarding today's evaluation or if we can be of further service, please contact the Maternal-Fetal Medicine Center.    Daina Rangel M.D.  Maternal Fetal-Medicine Specialist

## 2025-06-09 NOTE — PROGRESS NOTES
Data: Patient assessed in the [facility] for extended monitoring. Cervical exam deferred. Membranes intact. Contractions are not present. See flowsheets for fetal assessment documentation.     Action: Presumed adequate fetal oxygenation documented. Discharge instructions reviewed. Patient instructed to report any change in fetal movement, vaginal leaking of fluid or bleeding, abdominal pain, or any concerns related to the pregnancy to provider/clinic. Patient educated to sleep on sides and stay off back as much as possible.     Response: Orders to discharge home per Dr. Bradshaw. Patient verbalized understanding of education and agreement with plan. Discharged to home at 1646.

## 2025-06-09 NOTE — PROGRESS NOTES
Dr. Bradshaw reviewed strip with RN. Patient to be seen in clinic tomorrow for glucose testing. To be seen by M next Thursday. Patient to be discharged home undelivered.

## 2025-06-09 NOTE — NURSING NOTE
Lavelle Montana is a  at 27w1d who presents to [facility] for a follow-up ultrasound. Pt reports positive fetal movement. Pt denies bldg/lof/change in discharge, contractions, headache, vision changes, chest pain/SOB or edema. SBAR given to Dr. Rangel, see note in Epic.      NST Performed due to FGR.  Dr. Rangel  reviewed efm tracing. See NST/BPP Doc Flowsheet tab.     Variable decels noted on monitoring.  Lavelle is agreeable to monitoring in labor and delivery at [facility].  Report called to charge RN.   CONSUELO Gillespie RN

## 2025-06-09 NOTE — PROGRESS NOTES
Data: Patient presented to [facility]: 2025  1:38 PM.  Reason for maternal/fetal assessment is extended monitoring due to variables at M appointment. Patient denies uterine contractions, leaking of vaginal fluid/rupture of membranes, vaginal bleeding. Patient reports fetal movement is normal. Patient is a 27w1d .  Prenatal record reviewed. Pregnancy has been complicated by severe IUGR and heart defect.    Vital signs wnl. Support person is not present.     Action: Verbal consent for EFM. Triage assessment completed.     Response: Patient verbalized agreement with plan. Will contact Dr. Bradshaw with update and further orders.

## 2025-06-09 NOTE — PROGRESS NOTES
MFM called to notify that they are sending patient for prolonged monitoring due to intermittent variable decels. Had normal doppler today but does have severe growth restriction, membranous VSD, possible other anomalies. There was some question on if she should deliver at [facility] if there is a cardiac anomaly but fetal echo was more reassuring than what was expected before. If she did need to deliver at a hospital with peds cardiology she would prefer [facility] or [facility] given prior experience with them.     Tish Bradshaw MD, FACOG, [facility]  6/9/2025 2:02 PM

## 2025-06-09 NOTE — PROGRESS NOTES
Patient had 2 potential prolonged variable decelerations on the monitor when up to eat, otherwise had an appropriate for gestational age tracing. Patient has follow up Thursday and clinic follow up tomorrow for GCT and routine 28 week testing. Precautions to be given on returning for decreased movement, etc.     Tish Bradshaw MD, FACOG, [facility]  6/9/2025 4:35 PM

## 2025-06-09 NOTE — PROGRESS NOTES
RN at bedside putting patient on EFM and doing admission. Audible decels heard at 1352, 1353, 1355 and 1357. FHR in the 90s. Decels disconnected on EFM strip. Patient turned on right side at 1355. Pulse ox on, MHR 95. Uterus soft, patient denies any cramping/contractions. 1405 Dr. Bradshaw notified of patient arrival and strip.

## 2025-06-10 ENCOUNTER — LAB REQUISITION (OUTPATIENT)
Dept: LAB | Facility: CLINIC | Age: 40
End: 2025-06-10

## 2025-06-10 DIAGNOSIS — Z11.3 ENCOUNTER FOR SCREENING FOR INFECTIONS WITH A PREDOMINANTLY SEXUAL MODE OF TRANSMISSION: ICD-10-CM

## 2025-06-10 DIAGNOSIS — O99.019 ANEMIA COMPLICATING PREGNANCY, UNSPECIFIED TRIMESTER: ICD-10-CM

## 2025-06-10 LAB
ERYTHROCYTE [DISTWIDTH] IN BLOOD BY AUTOMATED COUNT: 12.9 % (ref 10–15)
GLUCOSE (EXTERNAL): 160 MG/DL (ref 70–99)
HCT VFR BLD AUTO: 35.1 % (ref 35–47)
HGB BLD-MCNC: 11.2 G/DL (ref 11.7–15.7)
MCH RBC QN AUTO: 30.7 PG (ref 26.5–33)
MCHC RBC AUTO-ENTMCNC: 31.9 G/DL (ref 31.5–36.5)
MCV RBC AUTO: 96 FL (ref 78–100)
PLATELET # BLD AUTO: 223 10E3/UL (ref 150–450)
RBC # BLD AUTO: 3.65 10E6/UL (ref 3.8–5.2)
WBC # BLD AUTO: 8.1 10E3/UL (ref 4–11)

## 2025-06-10 PROCEDURE — 86780 TREPONEMA PALLIDUM: CPT | Performed by: OBSTETRICS & GYNECOLOGY

## 2025-06-10 PROCEDURE — 85014 HEMATOCRIT: CPT | Performed by: OBSTETRICS & GYNECOLOGY

## 2025-06-11 LAB — T PALLIDUM AB SER QL: NONREACTIVE

## 2025-06-16 ENCOUNTER — HOSPITAL ENCOUNTER (OUTPATIENT)
Facility: HOSPITAL | Age: 40
Discharge: HOME | End: 2025-06-16
Attending: OBSTETRICS & GYNECOLOGY | Admitting: OBSTETRICS & GYNECOLOGY
Payer: COMMERCIAL

## 2025-06-16 ENCOUNTER — ANCILLARY PROCEDURE (OUTPATIENT)
Dept: ULTRASOUND IMAGING | Facility: HOSPITAL | Age: 40
End: 2025-06-16
Attending: OBSTETRICS & GYNECOLOGY
Payer: COMMERCIAL

## 2025-06-16 ENCOUNTER — OFFICE VISIT (OUTPATIENT)
Dept: MATERNAL FETAL MEDICINE | Facility: HOSPITAL | Age: 40
End: 2025-06-16
Attending: OBSTETRICS & GYNECOLOGY
Payer: COMMERCIAL

## 2025-06-16 VITALS
DIASTOLIC BLOOD PRESSURE: 80 MMHG | HEART RATE: 99 BPM | SYSTOLIC BLOOD PRESSURE: 125 MMHG | RESPIRATION RATE: 16 BRPM | OXYGEN SATURATION: 98 %

## 2025-06-16 DIAGNOSIS — O36.5990 PREGNANCY AFFECTED BY FETAL GROWTH RESTRICTION: Primary | ICD-10-CM

## 2025-06-16 DIAGNOSIS — O36.5990 PREGNANCY AFFECTED BY FETAL GROWTH RESTRICTION: ICD-10-CM

## 2025-06-16 PROCEDURE — 99214 OFFICE O/P EST MOD 30 MIN: CPT | Mod: 25 | Performed by: OBSTETRICS & GYNECOLOGY

## 2025-06-16 PROCEDURE — 76820 UMBILICAL ARTERY ECHO: CPT

## 2025-06-16 PROCEDURE — 59025 FETAL NON-STRESS TEST: CPT | Mod: 26 | Performed by: OBSTETRICS & GYNECOLOGY

## 2025-06-16 PROCEDURE — 76815 OB US LIMITED FETUS(S): CPT | Mod: 26 | Performed by: OBSTETRICS & GYNECOLOGY

## 2025-06-16 PROCEDURE — G0463 HOSPITAL OUTPT CLINIC VISIT: HCPCS | Mod: 25,27

## 2025-06-16 PROCEDURE — 76820 UMBILICAL ARTERY ECHO: CPT | Mod: 26 | Performed by: OBSTETRICS & GYNECOLOGY

## 2025-06-16 PROCEDURE — 76815 OB US LIMITED FETUS(S): CPT

## 2025-06-16 PROCEDURE — 59025 FETAL NON-STRESS TEST: CPT

## 2025-06-16 RX ORDER — LIDOCAINE 40 MG/G
CREAM TOPICAL
Status: DISCONTINUED | OUTPATIENT
Start: 2025-06-16 | End: 2025-06-16 | Stop reason: HOSPADM

## 2025-06-16 ASSESSMENT — ACTIVITIES OF DAILY LIVING (ADL)
ADLS_ACUITY_SCORE: 22

## 2025-06-16 NOTE — NURSING NOTE
Lavelle Montana is a  at 28w1d who presents to [facility] for [facility]/UAR/NST for SFGR, moderate poly. Pt reports positive fetal movement, though feels it has been less powerful or vigorous. Pt denies bldg/lof/change in discharge, contractions, headache, vision changes, chest pain/SOB or edema. SBAR given to Dr. Porras, see note in Epic. NST reviewed with Dr Porras, non reassuring NST, ultrasound quickly completed and pt being brought to [facility] L&D. Johana Morrissey RN

## 2025-06-16 NOTE — PROGRESS NOTES
OB Triage note    Date: 2025    Subjective:   Lavelle Montana is a 40 year old  at 28w1d presenting from [facility] for extended fetal monitoring.  She is getting care with Dr. Salvador and also with [facility].  Was at [facility] today for US and had noted variable decelerations on NST. She has been sent to L&D twice previously for this and had extended reassuring fetal testing.  Dr. Porras from [facility] suggested she be seen for extended fetal monitoring and a course of betamethasone.    US today showed polyhydramnios with SDP 11.6 cm and a normal umbilical artery doppler.     She had previously failed her 1 hour gtt in the clinic at 160. She is scheduled in the clinic tomorrow morning for her 3 hour gtt.     Pregnancy otherwise complicated by   IUGR with last growth at 1%ile  H/o  section x 2  Chronic hypertension  AMA  CPCs, small fetal stomach and fetal cardiac defect.  Planning repeat echo in 3rd trimester.     She has no vaginal bleeding. Has felt a few contractions. Notes she feels lots of fetal movement, but the movements are not quite as hard as they were last week.    O:  LMP 2024   Gen: Alert, no acute distress  Abd: Gravid, non-tender    NST:  Indication: IUGR, polyhydramnios, chronic hypertension   Results: 150s moderate variability, + accels. Few variable decelerations. Significant fetal movement audible     Assessment/Plan:  Lavelle Montana is a 40 year old  at 28w1d presenting for variable decelerations in the setting of IUGR, polyhydramnios, h/o continued monitoring.  Prior CS x2  Fetal cardiac defect.    Reviewed with Lavelle Zambrano the reason for extended monitoring and few variable decelerations we have seen in clinic and again here.  Recommendations from [facility] for BMZ course noted.   She is scheduled for  fasting 3 hour gtt in clinic tomorrow, so will hold off on BMZ until tomorrow morning after that is completed and give dose in clinic as well as repeat NST.   She will need 2nd dose in clinic on  Wednesday.  Reviewed [facility] and Dr. Wynn recommendations to transfer care with planned [facility]    Message sent to clinic MA and lab staff about add on NST and BMZ tomorrow.  Patient is in agreement with plan of care.    Sapna Gonzalez MD 6/16/2025 7:11 PM

## 2025-06-16 NOTE — PROGRESS NOTES
FHR difficult to monitor at times due to, fetal movement, anterior placenta and polyhydramnios. At 1758 RN at bedside adjusting monitor. FHR audible at times, but unable to trace FHR . Patient repositioned and then able to trace FHR.

## 2025-06-16 NOTE — PROGRESS NOTES
Data: Patient presented to [facility]: 2025  4:29 PM.  Reason for maternal/fetal assessment is Fetal heart rate decelerations in the clinic at [facility]. Patient was at her scheduled appointment with [facility] for FGR, polyhydramnios and fetal heart defect. During NST FHR tracing noted to have several decelerations and [facility]  Patient denies uterine contractions, leaking of vaginal fluid/rupture of membranes, vaginal bleeding, abdominal pain, pelvic pressure, nausea, vomiting, headache. Patient reports fetal movement is normal. Patient is a 28w1d .  Prenatal record reviewed. Pregnancy has been complicated by hypertension, advanced maternal age (>=36yo), fetal growth restriction, polyhydramnios , and fetal heart defect. She also has a history of two previous  sections.     Vital signs wnl. Support person is not present.     Action: Verbal consent for EFM. Triage assessment completed.     Response: Patient verbalized agreement with plan. Will contact Dr. Gonzalez with update and further orders.

## 2025-06-17 LAB
GLUCOSE P FAST SERPL-MCNC: 100 MG/DL
GTT-1HR-SERUM: 183 MG/DL
GTT-2HR-SERUM: 153 MG/DL
GTT-3HR-SERUM: NORMAL
Lab: NORMAL

## 2025-06-17 NOTE — PROGRESS NOTES
FHR continues to be difficult to trace at times again due to fetal movement that is very audible on the monitor. FHR tracing reviewed by Dr. Gonzalez.  Plan to discharge to home tonight and to go to schedule glucose test in the AM. She hs 3 hour glucose test and NST at the clinic in AM and also plan to give first dose of betamethasone after her glucose test is completed. Discharge instructions reviewed with patient, she verbalizes understanding.

## 2025-06-17 NOTE — PROCEDURES
NST Procedure note    Date: 6/16/2025    Indication: IUGR, polyhydramnios, variable decelerations     Procedure: Fetal non-stress test    Results: Appropriate for gestational age. 150s moderate variability. + accels. Rare variable decelerations.     Sapna Gonzalez MD  6/16/2025 7:12 PM

## 2025-06-17 NOTE — DISCHARGE INSTRUCTIONS
Learning About When to Call Your Doctor During Pregnancy (After 20 Weeks)  Overview  It's common to have concerns about what might be a problem when you're pregnant. Most pregnancies don't have any serious problems. But it's still important to know when to call your doctor if you have certain symptoms or signs of labor.  These are general suggestions. Your doctor may give you some more information about when to call.  When to call your doctor (after 20 weeks)  Call 911 anytime you think you may need emergency care. For example, call if:  You have severe vaginal bleeding. You have soaked through one or more pads in an hour, and the bleeding is not slowing down.  You have sudden, severe pain in your belly that does not go away.  You have chest pain, are short of breath, or cough up blood.  You passed out (lost consciousness).  You have a seizure.  You see or feel the umbilical cord.  You think you are about to deliver your baby and can't make it safely to the hospital or birthing center.  Call your doctor now or seek immediate medical care if:  You have vaginal bleeding.  You have belly pain.  You have a fever.  You are dizzy or lightheaded, or you feel like you may faint.  You have signs of a blood clot in your leg (called a deep vein thrombosis), such as:  Pain in the calf, back of the knee, thigh, or groin.  Swelling in your leg or groin.  A color change on the leg or groin. The skin may be reddish or purplish.  You have symptoms of preeclampsia, such as:  Sudden swelling of your face, hands, or feet.  New vision problems (such as dimness, blurring, or seeing spots).  A severe headache that will not go away.  You have a sudden release or slow trickle of fluid from your vagina. This may mean your water has broken.  You've been having regular contractions for an hour at less than 37 weeks. This means that you've had at least 6 contractions within 1 hour, even after you change your position and drink fluids.  You  "notice that your baby has stopped moving or is moving less than normal.  You have signs of heart failure, such as:  New or increased shortness of breath.  New or worse swelling in your legs, ankles, or feet.  Sudden weight gain, such as more than 2 to 3 pounds in a day or 5 pounds in a week.  Feeling so tired or weak that you cannot do your usual activities.  You have symptoms of a urinary tract infection. These may include:  Pain or burning when you urinate.  A frequent need to urinate without being able to pass much urine.  Pain in your low back (below the rib cage and above the waist).  Blood in your urine.  Watch closely for changes in your health, and be sure to contact your doctor if:  You have vaginal discharge that smells bad.  You feel sad, anxious, or hopeless for more than a few days.  You have skin changes, such as a rash, itching, or a yellow color to your skin.  You have other concerns about your pregnancy.  If you have labor signs at 37 weeks or more  If you have signs of labor at 37 weeks or more, your doctor may tell you to call when your labor becomes more active. During active labor:  Contractions happen more often and at regular intervals (about every 3 to 5 minutes).  Contractions last longer (about 60 seconds or more).  Contractions get stronger and are hard to talk through.  Follow-up care is a key part of your treatment and safety. Be sure to make and go to all appointments, and call your doctor if you are having problems. It's also a good idea to know your test results and keep a list of the medicines you take.  Where can you learn more?  Go to https://www.[employer].net/patiented  Enter N531 in the search box to learn more about \"Learning About When to Call Your Doctor During Pregnancy (After 20 Weeks).\"  Current as of: April 30, 2024  Content Version: 14.5    8797-9514 [facility] [employer]   Care instructions adapted under license by your healthcare professional. If you have questions " about a medical condition or this instruction, always ask your healthcare professional. [employer] disclaims any warranty or liability for your use of this information.

## 2025-06-22 ENCOUNTER — HEALTH MAINTENANCE LETTER (OUTPATIENT)
Age: 40
End: 2025-06-22

## 2025-06-23 ENCOUNTER — ANCILLARY PROCEDURE (OUTPATIENT)
Dept: ULTRASOUND IMAGING | Facility: HOSPITAL | Age: 40
End: 2025-06-23
Attending: OBSTETRICS & GYNECOLOGY
Payer: COMMERCIAL

## 2025-06-23 ENCOUNTER — OFFICE VISIT (OUTPATIENT)
Dept: MATERNAL FETAL MEDICINE | Facility: HOSPITAL | Age: 40
End: 2025-06-23
Attending: OBSTETRICS & GYNECOLOGY
Payer: COMMERCIAL

## 2025-06-23 VITALS — HEART RATE: 111 BPM | DIASTOLIC BLOOD PRESSURE: 73 MMHG | OXYGEN SATURATION: 98 % | SYSTOLIC BLOOD PRESSURE: 106 MMHG

## 2025-06-23 DIAGNOSIS — O36.5990 PREGNANCY AFFECTED BY FETAL GROWTH RESTRICTION: ICD-10-CM

## 2025-06-23 DIAGNOSIS — O36.5990 PREGNANCY AFFECTED BY FETAL GROWTH RESTRICTION: Primary | ICD-10-CM

## 2025-06-23 PROCEDURE — 76820 UMBILICAL ARTERY ECHO: CPT

## 2025-06-23 PROCEDURE — 3078F DIAST BP <80 MM HG: CPT | Performed by: STUDENT IN AN ORGANIZED HEALTH CARE EDUCATION/TRAINING PROGRAM

## 2025-06-23 PROCEDURE — 3074F SYST BP LT 130 MM HG: CPT | Performed by: STUDENT IN AN ORGANIZED HEALTH CARE EDUCATION/TRAINING PROGRAM

## 2025-06-23 NOTE — PROGRESS NOTES
The patient was seen for an ultrasound in the [facility]etal Medicine Center today.  For a detailed report of the ultrasound examination, please see the ultrasound report which can be found under the imaging tab.     If you have questions regarding today's evaluation or if we can be of further service, please contact the Maternal-Fetal Medicine Center.    Jenn Castillo MD  Maternal Fetal Medicine

## 2025-06-23 NOTE — NURSING NOTE
Pt is a  here at 29w1d for NST and Limited for growth and UAR r/t FGR.  Patient reports positive fetal movement, no pain, no contractions, leaking of fluid, or bleeding.  Pt reports she received beta x2 this past week at her [facility].  She states she sees her OB on  in clinic and they have offered to do weekly (1 of 2 weekly surveillance) BPP and NST.  SBAR given to CEFERINO GEIGER, see their note in Epic.

## 2025-06-27 ENCOUNTER — HOSPITAL ENCOUNTER (OUTPATIENT)
Facility: HOSPITAL | Age: 40
Discharge: HOME | End: 2025-06-28
Attending: FAMILY MEDICINE | Admitting: OBSTETRICS & GYNECOLOGY
Payer: COMMERCIAL

## 2025-06-27 ENCOUNTER — ANCILLARY PROCEDURE (OUTPATIENT)
Dept: ULTRASOUND IMAGING | Facility: HOSPITAL | Age: 40
End: 2025-06-27
Attending: OBSTETRICS & GYNECOLOGY
Payer: COMMERCIAL

## 2025-06-27 DIAGNOSIS — O36.5990 PREGNANCY AFFECTED BY FETAL GROWTH RESTRICTION: ICD-10-CM

## 2025-06-27 PROCEDURE — 76820 UMBILICAL ARTERY ECHO: CPT | Mod: 26 | Performed by: OBSTETRICS & GYNECOLOGY

## 2025-06-27 PROCEDURE — 59025 FETAL NON-STRESS TEST: CPT | Mod: 26 | Performed by: OBSTETRICS & GYNECOLOGY

## 2025-06-27 PROCEDURE — 76820 UMBILICAL ARTERY ECHO: CPT

## 2025-06-27 PROCEDURE — 76815 OB US LIMITED FETUS(S): CPT

## 2025-06-27 PROCEDURE — 76815 OB US LIMITED FETUS(S): CPT | Mod: 26 | Performed by: OBSTETRICS & GYNECOLOGY

## 2025-06-27 RX ORDER — LIDOCAINE 40 MG/G
CREAM TOPICAL
Status: DISCONTINUED | OUTPATIENT
Start: 2025-06-27 | End: 2025-06-28 | Stop reason: HOSPADM

## 2025-06-27 ASSESSMENT — ACTIVITIES OF DAILY LIVING (ADL)
ADLS_ACUITY_SCORE: 22
ADLS_ACUITY_SCORE: 48

## 2025-06-27 NOTE — PROGRESS NOTES
Lavelle arrived ambulatory from [facility] appointment for extended monitoring after [facility] reported decelerations on the NST. Lavelle reports positive fetal movement and denies any vaginal bleeding or leaking of fluids. Lavelle reports that in the past few days she has been feeling abdominal tightening, but less so today. Abdomen soft, nontender. Monitors applied, call light within reach.

## 2025-06-27 NOTE — PROGRESS NOTES
Patient here for extend monitoring due to FHR decelerations at MFM. FHR has been normal baseline with moderate variability and 15x15 accelerations, there have been some small variable decelerations and few episodic decelerations that resolved with maternal position change. Plan to continue to monitor for at least 2 hours.

## 2025-06-27 NOTE — PROGRESS NOTES
OB triage note  This patient was being seen in MFM earlier today for a history of gestational diabetes(recently diagnosed and has not seen diabetic clinic yet), IUGR, polyhydramnios, VSD and the baby and 2 prior  sections.  The fluid volume was 30 today.  She was on the monitor and MFM and several variable decelerations were noted so she was sent here for further monitoring.  She denies any contractions or vaginal bleeding or leakage of fluid.  She endorses good fetal movement.    Blood pressure 120/7  Abdomen soft, gravid, nontender  Fetal heart tones: Baseline 140s with moderate variability and accelerations noted.  Initially on the monitor at about 5:09 PM she had at least 2 decelerations to the 90s with quick return to baseline and there appeared to be moderate variability during the decelerations.  She is having occasional contractions that she feels mildly  Impression: 29-week  3 para 2 with IUGR and polyhydramnios and a VSD and the baby who had a couple of variable decelerations on her tracing but overall looks like a category 1 tracing.  My recommendation (including after speaking with maternal-fetal medicine on-call) is to do continuous monitoring for the next few hours.  If the tracing remains category 1 tracing then we will discharge her home to follow-up next week for her routine visit.  If she continues to have variable decelerations that we will watch her overnight.  If the tracing becomes more ominous with decreased variability and longer decelerations that appear late in nature or the bradycardia develops then we would consider delivering the baby.  I do not feel she  needs to be transferred to a tertiary care center because of the VSD at this point.  If the baby was delivered here then we might need to transfer the baby after delivery.  She is okay with this plan.

## 2025-06-28 ENCOUNTER — HOSPITAL ENCOUNTER (OUTPATIENT)
Facility: HOSPITAL | Age: 40
End: 2025-06-28
Admitting: OBSTETRICS & GYNECOLOGY
Payer: COMMERCIAL

## 2025-06-28 VITALS
TEMPERATURE: 98.9 F | RESPIRATION RATE: 18 BRPM | HEIGHT: 60 IN | SYSTOLIC BLOOD PRESSURE: 89 MMHG | OXYGEN SATURATION: 96 % | BODY MASS INDEX: 34.76 KG/M2 | DIASTOLIC BLOOD PRESSURE: 52 MMHG | HEART RATE: 94 BPM

## 2025-06-28 PROCEDURE — G0463 HOSPITAL OUTPT CLINIC VISIT: HCPCS

## 2025-06-28 ASSESSMENT — ACTIVITIES OF DAILY LIVING (ADL)
ADLS_ACUITY_SCORE: 22

## 2025-06-28 NOTE — PROGRESS NOTES
"Lavelle Zambrano reports she feels like she might be having vaginal discharge that smells \"fishy\".  She states she has not showered today because she was not planning on staying at the hospital and does not have itching, or increased discharge but she notices a change in the smell.  Writer brings her in a fresh pair of underwear and a new pad to put in and we plan to observe discharge on the pad and get orders for a wet prep if needed.   "

## 2025-06-28 NOTE — PROGRESS NOTES
Writer recognizes that triage orders were not placed by previous shift.  Verbal orders were in place when writer resumed care and not placed in Epic.

## 2025-06-28 NOTE — DISCHARGE INSTRUCTIONS
Learning About When to Call Your Doctor During Pregnancy (After 20 Weeks)  Overview  It's common to have concerns about what might be a problem when you're pregnant. Most pregnancies don't have any serious problems. But it's still important to know when to call your doctor if you have certain symptoms or signs of labor.  These are general suggestions. Your doctor may give you some more information about when to call.  When to call your doctor (after 20 weeks)  Call 911 anytime you think you may need emergency care. For example, call if:  You have severe vaginal bleeding. You have soaked through one or more pads in an hour, and the bleeding is not slowing down.  You have sudden, severe pain in your belly that does not go away.  You have chest pain, are short of breath, or cough up blood.  You passed out (lost consciousness).  You have a seizure.  You see or feel the umbilical cord.  You think you are about to deliver your baby and can't make it safely to the hospital or birthing center.  Call your doctor now or seek immediate medical care if:  You have vaginal bleeding.  You have belly pain.  You have a fever.  You are dizzy or lightheaded, or you feel like you may faint.  You have signs of a blood clot in your leg (called a deep vein thrombosis), such as:  Pain in the calf, back of the knee, thigh, or groin.  Swelling in your leg or groin.  A color change on the leg or groin. The skin may be reddish or purplish.  You have symptoms of preeclampsia, such as:  Sudden swelling of your face, hands, or feet.  New vision problems (such as dimness, blurring, or seeing spots).  A severe headache that will not go away.  You have a sudden release or slow trickle of fluid from your vagina. This may mean your water has broken.  You've been having regular contractions for an hour at less than 37 weeks. This means that you've had at least 6 contractions within 1 hour, even after you change your position and drink fluids.  You  "notice that your baby has stopped moving or is moving less than normal.  You have signs of heart failure, such as:  New or increased shortness of breath.  New or worse swelling in your legs, ankles, or feet.  Sudden weight gain, such as more than 2 to 3 pounds in a day or 5 pounds in a week.  Feeling so tired or weak that you cannot do your usual activities.  You have symptoms of a urinary tract infection. These may include:  Pain or burning when you urinate.  A frequent need to urinate without being able to pass much urine.  Pain in your low back (below the rib cage and above the waist).  Blood in your urine.  Watch closely for changes in your health, and be sure to contact your doctor if:  You have vaginal discharge that smells bad.  You feel sad, anxious, or hopeless for more than a few days.  You have skin changes, such as a rash, itching, or a yellow color to your skin.  You have other concerns about your pregnancy.  If you have labor signs at 37 weeks or more  If you have signs of labor at 37 weeks or more, your doctor may tell you to call when your labor becomes more active. During active labor:  Contractions happen more often and at regular intervals (about every 3 to 5 minutes).  Contractions last longer (about 60 seconds or more).  Contractions get stronger and are hard to talk through.  Follow-up care is a key part of your treatment and safety. Be sure to make and go to all appointments, and call your doctor if you are having problems. It's also a good idea to know your test results and keep a list of the medicines you take.  Follow up with Dr. Salvador on Thursday as scheduled.  Where can you learn more?  Go to https://www.[employer]itro[employer].net/patiented  Enter N531 in the search box to learn more about \"Learning About When to Call Your Doctor During Pregnancy (After 20 Weeks).\"  Current as of: April 30, 2024  Content Version: 14.5 2024-2025 [facility] [employer].   Care instructions adapted under license by " your healthcare professional. If you have questions about a medical condition or this instruction, always ask your healthcare professional. [employer] disclaims any warranty or liability for your use of this information.

## 2025-06-28 NOTE — PLAN OF CARE
Problem: Adult Inpatient Plan of Care  Goal: Absence of Hospital-Acquired Illness or Injury  Outcome: Progressing   Goal Outcome Evaluation:  Lavelle Zambrano's vital signs are stable     Problem: Adult Inpatient Plan of Care  Goal: Optimal Comfort and Wellbeing  6/28/2025 0000 by Romelia Falcon, RN  Outcome: Progressing  6/27/2025 2356 by Romelia Falcon, RN  Outcome: Progressing   Extra mattress placed on bed for comfort      Plan of Care Reviewed With: patient    Overall Patient Progress: improvingOverall Patient Progress: improving    Outcome Evaluation: Lavelle Zambrano will sleep well tonight.

## 2025-06-28 NOTE — PROGRESS NOTES
Fetal heart tones still shows a baseline in the 140s with moderate variability and accelerations noted.  She did have another likely deceleration to the 100s around 7 PM with moderate variability throughout that deceleration.  I am recommending observation overnight with continuous fetal monitoring.  I cannot guarantee fetal wellbeing if she goes home.  She has a wedding plans for her sister in law tomorrow but she is willing to stay overnight.  We will allow her to eat and keep her on continuous monitoring overnight.

## 2025-06-28 NOTE — PROGRESS NOTES
Cat 1 tracing for most of the night. Hd one deceleration around 2330. None since  No pain or bleeding or contractions. Good FM  Will discharge home and follow up with [place] next week. Reviewed kick counting.

## 2025-06-28 NOTE — PROGRESS NOTES
0700 Dr. Hoskins at the bedside counseling the patient for discharge.  Writer reviews all discharge instructions of which Lavelle Zambrano verbalizes understanding.  Writer also follows up on vaginal discharge by observing her pad.  Lavelle Zambrano states the amount of discharge is not abnormal for her and feels like she doesn't have odorous discharge when she is showered. Writer instructs Lavelle Zambrano to observe her discharge carefully this week and if it changes to discuss this with Dr. Salvador on Thursday.  Home, stable and undelivered.

## 2025-06-30 ENCOUNTER — TRANSFERRED RECORDS (OUTPATIENT)
Dept: HEALTH INFORMATION MANAGEMENT | Facility: CLINIC | Age: 40
End: 2025-06-30
Payer: COMMERCIAL

## 2025-07-01 ENCOUNTER — DOCUMENTATION ONLY (OUTPATIENT)
Dept: MATERNAL FETAL MEDICINE | Facility: CLINIC | Age: 40
End: 2025-07-01
Payer: COMMERCIAL

## 2025-07-02 NOTE — TELEPHONE ENCOUNTER
July 1 2025     Per standard protocol, Lavelle's pregnancy was reviewed on the [facility]-pediatric genetics case conference.     The pediatric genetics team requests that her case be re-reviewed at a later date further along in the pregnancy.     Chay Olsen MS, [facility]  Board Certified and [place] Licensed Genetic Counselor  [facility]  Maternal Fetal Medicine  Office: [phone]  [facility]: [phone]   Fax: [phone]  [facility]

## 2025-07-03 ENCOUNTER — OFFICE VISIT (OUTPATIENT)
Dept: MATERNAL FETAL MEDICINE | Facility: HOSPITAL | Age: 40
End: 2025-07-03
Attending: STUDENT IN AN ORGANIZED HEALTH CARE EDUCATION/TRAINING PROGRAM
Payer: COMMERCIAL

## 2025-07-03 ENCOUNTER — ANCILLARY PROCEDURE (OUTPATIENT)
Dept: ULTRASOUND IMAGING | Facility: HOSPITAL | Age: 40
End: 2025-07-03
Attending: OBSTETRICS & GYNECOLOGY
Payer: COMMERCIAL

## 2025-07-03 VITALS
HEART RATE: 97 BPM | DIASTOLIC BLOOD PRESSURE: 72 MMHG | OXYGEN SATURATION: 96 % | SYSTOLIC BLOOD PRESSURE: 108 MMHG | RESPIRATION RATE: 16 BRPM

## 2025-07-03 DIAGNOSIS — O35.9XX0 SUSPECTED FETAL ANOMALY, ANTEPARTUM, SINGLE OR UNSPECIFIED FETUS: ICD-10-CM

## 2025-07-03 DIAGNOSIS — O09.522 MULTIGRAVIDA OF ADVANCED MATERNAL AGE IN SECOND TRIMESTER: ICD-10-CM

## 2025-07-03 DIAGNOSIS — O28.3 ABNORMAL FETAL ULTRASOUND: ICD-10-CM

## 2025-07-03 DIAGNOSIS — O36.5990 PREGNANCY AFFECTED BY FETAL GROWTH RESTRICTION: Primary | ICD-10-CM

## 2025-07-03 DIAGNOSIS — O36.5990 PREGNANCY AFFECTED BY FETAL GROWTH RESTRICTION: ICD-10-CM

## 2025-07-03 PROCEDURE — 76816 OB US FOLLOW-UP PER FETUS: CPT

## 2025-07-03 PROCEDURE — 76820 UMBILICAL ARTERY ECHO: CPT

## 2025-07-03 PROCEDURE — 59025 FETAL NON-STRESS TEST: CPT

## 2025-07-03 PROCEDURE — 76820 UMBILICAL ARTERY ECHO: CPT | Mod: 26 | Performed by: OBSTETRICS & GYNECOLOGY

## 2025-07-03 PROCEDURE — 59025 FETAL NON-STRESS TEST: CPT | Mod: 26 | Performed by: OBSTETRICS & GYNECOLOGY

## 2025-07-03 PROCEDURE — 76816 OB US FOLLOW-UP PER FETUS: CPT | Mod: 26 | Performed by: OBSTETRICS & GYNECOLOGY

## 2025-07-03 NOTE — NURSING NOTE
Lavelle Montana is a  at 30w4d who presents to [facility] for RL2/growth/UAR for SFGR, NST after. Pt reports positive fetal movement. Pt denies bldg/lof/change in discharge, contractions, headache, vision changes, chest pain/SOB or edema. Pt reports itching and bumps on outside of hands and forearms.  Pt has a new diagnosis of GDM and has not yet picked up glucometer therefore not checking sugars.  After reviewing delivery location with Primary [facility], pt desires to deliver at [facility]. SBAR given to Dr. Porras, and NST reviewed together, see note in Epic.

## 2025-07-10 ENCOUNTER — MEDICAL CORRESPONDENCE (OUTPATIENT)
Dept: HEALTH INFORMATION MANAGEMENT | Facility: CLINIC | Age: 40
End: 2025-07-10
Payer: COMMERCIAL

## 2025-07-10 ENCOUNTER — TRANSFERRED RECORDS (OUTPATIENT)
Dept: HEALTH INFORMATION MANAGEMENT | Facility: CLINIC | Age: 40
End: 2025-07-10
Payer: COMMERCIAL

## 2025-07-11 ENCOUNTER — ANCILLARY PROCEDURE (OUTPATIENT)
Dept: ULTRASOUND IMAGING | Facility: HOSPITAL | Age: 40
End: 2025-07-11
Attending: OBSTETRICS & GYNECOLOGY
Payer: COMMERCIAL

## 2025-07-11 ENCOUNTER — MEDICAL CORRESPONDENCE (OUTPATIENT)
Dept: HEALTH INFORMATION MANAGEMENT | Facility: CLINIC | Age: 40
End: 2025-07-11

## 2025-07-11 DIAGNOSIS — O36.5990 PREGNANCY AFFECTED BY FETAL GROWTH RESTRICTION: ICD-10-CM

## 2025-07-11 PROCEDURE — 76820 UMBILICAL ARTERY ECHO: CPT

## 2025-07-15 ENCOUNTER — HOSPITAL ENCOUNTER (OUTPATIENT)
Dept: CARDIOLOGY | Facility: CLINIC | Age: 40
Discharge: HOME | End: 2025-07-15
Attending: PEDIATRICS
Payer: COMMERCIAL

## 2025-07-15 ENCOUNTER — OFFICE VISIT (OUTPATIENT)
Dept: CARDIOLOGY | Facility: CLINIC | Age: 40
End: 2025-07-15

## 2025-07-15 ENCOUNTER — VIRTUAL VISIT (OUTPATIENT)
Dept: OBGYN | Facility: CLINIC | Age: 40
End: 2025-07-15
Payer: COMMERCIAL

## 2025-07-15 ENCOUNTER — HOSPITAL ENCOUNTER (OUTPATIENT)
Dept: ULTRASOUND IMAGING | Facility: CLINIC | Age: 40
Discharge: HOME | End: 2025-07-15
Attending: OBSTETRICS & GYNECOLOGY
Payer: COMMERCIAL

## 2025-07-15 ENCOUNTER — OFFICE VISIT (OUTPATIENT)
Dept: MATERNAL FETAL MEDICINE | Facility: CLINIC | Age: 40
End: 2025-07-15
Attending: OBSTETRICS & GYNECOLOGY
Payer: COMMERCIAL

## 2025-07-15 DIAGNOSIS — O35.BXX0 FETAL CARDIAC DISEASE AFFECTING PREGNANCY, SINGLE OR UNSPECIFIED FETUS: Primary | ICD-10-CM

## 2025-07-15 DIAGNOSIS — O40.9XX0 POLYHYDRAMNIOS: ICD-10-CM

## 2025-07-15 DIAGNOSIS — O09.529 SUPERVISION OF HIGH-RISK PREGNANCY OF ELDERLY MULTIGRAVIDA: Primary | ICD-10-CM

## 2025-07-15 DIAGNOSIS — O40.3XX0 POLYHYDRAMNIOS AFFECTING PREGNANCY IN THIRD TRIMESTER: Primary | ICD-10-CM

## 2025-07-15 DIAGNOSIS — O35.BXX0 FETAL VENTRICULAR SEPTAL DEFECT AFFECTING ANTEPARTUM CARE OF MOTHER: ICD-10-CM

## 2025-07-15 DIAGNOSIS — O13.9 PREGNANCY INDUCED HYPERTENSION: ICD-10-CM

## 2025-07-15 DIAGNOSIS — O36.5990 PREGNANCY AFFECTED BY FETAL GROWTH RESTRICTION: ICD-10-CM

## 2025-07-15 DIAGNOSIS — O24.419 GESTATIONAL DIABETES MELLITUS: ICD-10-CM

## 2025-07-15 PROCEDURE — 99207 PR NO CHARGE NURSE ONLY: CPT

## 2025-07-15 PROCEDURE — 76827 ECHO EXAM OF FETAL HEART: CPT | Mod: 26 | Performed by: PEDIATRICS

## 2025-07-15 PROCEDURE — 76819 FETAL BIOPHYS PROFIL W/O NST: CPT

## 2025-07-15 PROCEDURE — 93325 DOPPLER ECHO COLOR FLOW MAPG: CPT | Mod: 26 | Performed by: PEDIATRICS

## 2025-07-15 PROCEDURE — 76825 ECHO EXAM OF FETAL HEART: CPT | Mod: 26 | Performed by: PEDIATRICS

## 2025-07-15 PROCEDURE — 93325 DOPPLER ECHO COLOR FLOW MAPG: CPT

## 2025-07-15 RX ORDER — NIFEDIPINE 30 MG/1
1 TABLET, EXTENDED RELEASE ORAL
COMMUNITY
Start: 2025-02-18

## 2025-07-15 NOTE — PROGRESS NOTES
Fetal Cardiology Consultation    Patient:  Lavelle Montana MRN:  [id number]   YOB: 1985 Age:  40 year old   Date of Visit:  7/15/2025 PCP:  Rose Jerry MD   JON: 2025, by Last Menstrual Period EGA: 32w2d weeks     Dear Doctor,     I had the pleasure of seeing Lavelle Montana at the [facility] Fetal Echocardiography Laboratory in [place] on 7/15/2025 in consultation for fetal echocardiography results. She presented today accompanied by her partner. As you know, she is a 40 year old female with current pregnancy affected by ventricular septal defect and previously was unable to visualize the aortic valve size.    The fetal echocardiogram was abnormal: technically difficult study. The moderate to large perimembranous ventricular septal defect was not well seen clearly on today's study. The aortic valve annulus is normal. Normal right and left ventricular size and function. No pericardial effusion.     I reviewed and interpreted the fetal echocardiogram today. I discussed the anatomy, physiology, expected fetal course, and need for post- confirmation. The ventricular septal defect size was difficult to assess on today's study. The aortic valve appears to be normal in size. We recommend a post- echocardiogram to determine the size of the ventricular septal defect.     -- No additional fetal echocardiograms are recommended for this pregnancy.  -- A post-fabien transthoracic echocardiogram is recommended to evaluate ventricular septum within 24-48 hours of life.    Thank you for allowing me to participate in Ms. Montana's care. Please don't hesitate to contact me or the Fetal Cardiology team at [facility] with any questions or concerns.     This visit was separate from the performance and interpretation of the ultrasound. The majority of the time (>50%) was spent in counseling and coordination of care. I spent approximately 40 minutes in face-to-face time reviewing the  above considerations.    Patricia Garcia MD  Pediatric Cardiology  [facility]  Phone [phone]

## 2025-07-15 NOTE — PROGRESS NOTES
Please refer to ultrasound report under 'Imaging' Studies of 'Chart Review' tabs.    Farshad Zapata M.D.

## 2025-07-15 NOTE — NURSING NOTE
Patient reports positive fetal movement, no pain, no contractions, leaking of fluid, or bleeding.  Reports blood sugar values fasting 90 this morning.  Education provided to patient on today's ultrasound.  SBAR given to CEFERINO GEIGER, see their note in Epic.

## 2025-07-17 ENCOUNTER — TELEPHONE (OUTPATIENT)
Dept: OBGYN | Facility: CLINIC | Age: 40
End: 2025-07-17
Payer: COMMERCIAL

## 2025-07-17 NOTE — TELEPHONE ENCOUNTER
Spoke with patient, reviewed diabetic educator's note from [facility]'s note, patient agreed with her plan of care.    Endocrinology referral placed.    NADEEN JOY, LPN on 7/17/2025 at 2:59 PM

## 2025-07-17 NOTE — TELEPHONE ENCOUNTER
[facility] Call Center    Phone Message    May a detailed message be left on voicemail: yes     Reason for Call: Other: Pt was returning Ambers phone call.  Writer tried contacting secure chat, no answer. Please call patient back.     Action Taken: Message routed to:  Other: OBGYN    Travel Screening: Not Applicable

## 2025-07-17 NOTE — TELEPHONE ENCOUNTER
Returned patient's call.   See TE from 07/17/25.    NADEEN LUCERO WEEKLY, LPN on 7/17/2025 at 3:00 PM

## 2025-07-17 NOTE — PROGRESS NOTES
SUBJECTIVE:     HPI:    This is a 40 year old female patient,  who presents for her Transfer of Care New OB Nurse Intake Visit.    JON: 2025, by ultrasound on 25 (11w).  She is 32w4d weeks.  Her cycles are regular.  Her last menstrual period was normal.   Since her LMP, she has had no complaints).   She denies nausea, emesis, abdominal pain, fatigue, headache, loss of appetite, vaginal discharge, dysuria, pelvic pain, urinary urgency, lightheadedness, urinary frequency, vaginal bleeding, hemorrhoids, and constipation.    Additional History:   - CLARITZA - [facility] (records in chart)  - AMA  - FOB - [facility]  - Prenatal Hx:  x 2 ( & ), PP hemorrhage in   - NIPT completed - Low risk  - Followed by MFM - Fetal CHD, FGR, Polyhydramnios, weekly BPP & fetal growth scan every 4 weeks  - Followed by Cardiology - Fetal CHD  - Pt has GDM, Endo referral placed (25) due to increased glucose readings, should start insulin per last visit with Diabetic educator  - Pt has Pregnancy-Induced Hypertension (stopped Nifedipine in 1st Trimester)  - Pt has anxiety this pregnancy due to issues with baby    Have you travelled during the pregnancy?No  Have your sexual partner(s) travelled during the pregnancy?No    HISTORY:   Planned Pregnancy: Yes  Marital Status: Single  Occupation: Insurance  Living in Household: Partner and Children    Past History:  Her past medical history   Past Medical History:   Diagnosis Date    Anxiety     Cervical stenosis (uterine cervix)     Endometriosis     Hypertension     Infertility, female        She has a history of  prior  section, hemorrhage, pregnancy-induced hypertension.    Since her last LMP she denies use of alcohol, tobacco and street drugs.    Past medical, surgical, social and family history were reviewed and updated in [facility].      Current Outpatient Medications   Medication Sig Dispense Refill    Prenatal Vit-Fe Fumarate-FA (PRENATAL  MULTIVITAMIN  PLUS IRON) 27-1 MG TABS Take 1 tablet by mouth daily.      NIFEdipine ER OSMOTIC (PROCARDIA XL) 30 MG 24 hr tablet Take 1 tablet by mouth daily at 2 pm. (Patient not taking: Reported on 7/15/2025)       No current facility-administered medications for this visit.       ROS:   12 point review of systems negative other than symptoms noted below or in the HPI.       [facility] NURSE EDUCATION      Clinic Phone Number: [phone]      Confirmed patient desires delivery at [facility] with the [facility] providers.     Nurse phone visit completed. Prenatal book and folder (containing standard educational hand-outs and brochures) will be given to patient at upcoming appointment. Information in the folder reviewed over the phone, questions answered. Information given on optional screening available to assess chromosomal and genetic anomalies. Patient completed NIPT. Patient advised to call the clinic if she has any questions or concerns related to her pregnancy.     Prenatal labs, ultrasound and MFM referral orders placed.     New Prenatal Visit: 07/25/25 at 2:00pm with Glenda Dallas MD LPN informed patient to use Partschannel messaging for non-urgent concerns, symptoms or questions.  P[employer] messages are only answered Monday - Friday from 8:00am to 4:30pm.   Patient understands to call the clinic for any urgent or RED FLAG concerns or symptoms like:     -Excessive vomiting   -Vaginal bleeding   -Leakage of fluid   -Contractions   -Decreased fetal movement    Patient understands that education related to pregnancy, medications safe in pregnancy are sent as a HelloNaturehart message, as well as having several educational handouts in the After Visit Summary from today's appointment. Patient verbalized understanding, is in agreement with the plan, and voiced no further questions or concerns.     NADEEN JOY LPN on 7/17/2025 at 10:49 AM

## 2025-07-17 NOTE — PATIENT INSTRUCTIONS
Weeks 32 to 34 of Your Pregnancy: Care Instructions    Decide whether you want to bank or donate your baby's umbilical cord blood. If you want to save this blood, you have to arrange for it ahead of time.   Decide about circumcision. Personal, Gnosticism, or cultural beliefs may play a role in your decision. You get to decide what you want for your baby.   Tips for weeks 32 to 34 of pregnancy  Learn how to ease hemorrhoids.       Get more liquids, fruits, vegetables, and fiber in your diet.  Avoid sitting for too long.  Clean yourself with moist toilet paper. Or try witch hazel pads.  Try ice packs or warm sitz baths for discomfort.  Use hydrocortisone cream for pain or itching.  Ask your doctor about stool softeners.  Consider the benefits of breastfeeding.       It reduces your baby's risk of sudden infant death syndrome (SIDS).   babies are less likely to get certain infections. And they're less likely to be obese or get diabetes later in life.  It can lower your risk of breast and ovarian cancers and osteoporosis.  It saves you money.  Follow-up care is a key part of your treatment and safety. Be sure to make and go to all appointments, and call your doctor if you are having problems. It's also a good idea to know your test results and keep a list of the medicines you take.  When should you call for help?  Call 911 anytime you think you may need emergency care. For example, call if:  You have severe vaginal bleeding. You have soaked through one or more pads in an hour, and the bleeding is not slowing down.  You have sudden, severe pain in your belly that does not go away.  You have chest pain, are short of breath, or cough up blood.  You passed out (lost consciousness).  You have a seizure.  You see or feel the umbilical cord.  You think you are about to deliver your baby and can't make it safely to the hospital or birthing center.  Call your doctor now or seek immediate medical care if:  You have  "vaginal bleeding.  You have belly pain.  You have a fever.  You are dizzy or lightheaded, or you feel like you may faint.  You have signs of a blood clot in your leg (called a deep vein thrombosis), such as:  Pain in the calf, back of the knee, thigh, or groin.  Swelling in your leg or groin.  A color change on the leg or groin. The skin may be reddish or purplish.  You have symptoms of preeclampsia, such as:  Sudden swelling of your face, hands, or feet.  New vision problems (such as dimness, blurring, or seeing spots).  A severe headache that will not go away.  You have a sudden release or slow trickle of fluid from your vagina. This may mean your water has broken.  You've been having regular contractions for an hour. This means that you've had at least 6 contractions within 1 hour, even after you change your position and drink fluids.  You notice that your baby has stopped moving or is moving less than normal.  You have symptoms of a urinary tract infection. These may include:  Pain or burning when you urinate.  A frequent need to urinate without being able to pass much urine.  Pain in your low back (below the rib cage and above the waist).  Blood in your urine.  Watch closely for changes in your health, and be sure to contact your doctor if:  You have vaginal discharge that smells bad.  You feel sad, anxious, or hopeless for more than a few days.  You have skin changes, such as a rash, itching, or a yellow color to your skin.  You have other concerns about your pregnancy.  Where can you learn more?  Go to https://www.GHash.[employer].net/patiented  Enter X711 in the search box to learn more about \"Weeks 32 to 34 of Your Pregnancy: Care Instructions.\"  Current as of: April 30, 2024  Content Version: 14.5    6712-3918 [employer].   Care instructions adapted under license by your healthcare professional. If you have questions about a medical condition or this instruction, always ask your healthcare " professional. [employer] disclaims any warranty or liability for your use of this information.    You have been provided the Any Day Now: Early Labor at Home document.    Additional copies can be found here:  [email or web address].pdf  You have been provided the What I'd Wish I'd Known About Giving Birth document.    Additional copies can be found here:  www.[employer].com/501084.pdf

## 2025-07-17 NOTE — TELEPHONE ENCOUNTER
Attempted to contact patient regarding the Endocrinology referral placed by previous OB provider from [facility].  Per letter in patient's chart, referral was canceled because patient states she no longer needed to be seen there. Need to speak to patient regarding clarification on why she doesn't need to see Endocrinology anymore and possibly place referral again.    No answer, message left for call back.    NADEEN JOY, LPN on 7/17/2025 at 10:54 AM

## 2025-07-18 ENCOUNTER — HOSPITAL ENCOUNTER (OUTPATIENT)
Facility: CLINIC | Age: 40
Setting detail: OBSERVATION
Discharge: HOME | End: 2025-07-19
Attending: OBSTETRICS & GYNECOLOGY | Admitting: OBSTETRICS & GYNECOLOGY
Payer: COMMERCIAL

## 2025-07-18 ENCOUNTER — ANCILLARY PROCEDURE (OUTPATIENT)
Dept: ULTRASOUND IMAGING | Facility: HOSPITAL | Age: 40
End: 2025-07-18
Attending: STUDENT IN AN ORGANIZED HEALTH CARE EDUCATION/TRAINING PROGRAM
Payer: COMMERCIAL

## 2025-07-18 DIAGNOSIS — O36.5990 PREGNANCY AFFECTED BY FETAL GROWTH RESTRICTION: ICD-10-CM

## 2025-07-18 PROBLEM — O36.8390 NON-REASSURING FETAL HEART RATE WITH LATE DECELERATION: Status: ACTIVE | Noted: 2025-07-18

## 2025-07-18 LAB
ABO + RH BLD: NORMAL
BASOPHILS # BLD AUTO: 0 10E3/UL (ref 0–0.2)
BASOPHILS NFR BLD AUTO: 0 %
BLD GP AB SCN SERPL QL: NEGATIVE
EOSINOPHIL # BLD AUTO: 0.1 10E3/UL (ref 0–0.7)
EOSINOPHIL NFR BLD AUTO: 1 %
ERYTHROCYTE [DISTWIDTH] IN BLOOD BY AUTOMATED COUNT: 13.5 % (ref 10–15)
HCT VFR BLD AUTO: 32.8 % (ref 35–47)
HGB BLD-MCNC: 11 G/DL (ref 11.7–15.7)
IMM GRANULOCYTES # BLD: 0.1 10E3/UL
IMM GRANULOCYTES NFR BLD: 1 %
LYMPHOCYTES # BLD AUTO: 1.9 10E3/UL (ref 0.8–5.3)
LYMPHOCYTES NFR BLD AUTO: 19 %
MCH RBC QN AUTO: 29.6 PG (ref 26.5–33)
MCHC RBC AUTO-ENTMCNC: 33.5 G/DL (ref 31.5–36.5)
MCV RBC AUTO: 88 FL (ref 78–100)
MONOCYTES # BLD AUTO: 0.7 10E3/UL (ref 0–1.3)
MONOCYTES NFR BLD AUTO: 7 %
NEUTROPHILS # BLD AUTO: 6.9 10E3/UL (ref 1.6–8.3)
NEUTROPHILS NFR BLD AUTO: 71 %
NRBC # BLD AUTO: 0 10E3/UL
NRBC BLD AUTO-RTO: 0 /100
PLATELET # BLD AUTO: 214 10E3/UL (ref 150–450)
RBC # BLD AUTO: 3.71 10E6/UL (ref 3.8–5.2)
SPECIMEN EXP DATE BLD: NORMAL
WBC # BLD AUTO: 9.8 10E3/UL (ref 4–11)

## 2025-07-18 PROCEDURE — 36415 COLL VENOUS BLD VENIPUNCTURE: CPT

## 2025-07-18 PROCEDURE — 85025 COMPLETE CBC W/AUTO DIFF WBC: CPT

## 2025-07-18 PROCEDURE — 86900 BLOOD TYPING SEROLOGIC ABO: CPT

## 2025-07-18 PROCEDURE — 76819 FETAL BIOPHYS PROFIL W/O NST: CPT

## 2025-07-18 RX ORDER — DOCUSATE SODIUM 100 MG/1
100 CAPSULE, LIQUID FILLED ORAL 2 TIMES DAILY
Status: DISCONTINUED | OUTPATIENT
Start: 2025-07-18 | End: 2025-07-19 | Stop reason: HOSPADM

## 2025-07-18 RX ORDER — ONDANSETRON 2 MG/ML
4 INJECTION INTRAMUSCULAR; INTRAVENOUS EVERY 6 HOURS PRN
Status: DISCONTINUED | OUTPATIENT
Start: 2025-07-18 | End: 2025-07-19 | Stop reason: HOSPADM

## 2025-07-18 RX ORDER — PANTOPRAZOLE SODIUM 40 MG/1
40 TABLET, DELAYED RELEASE ORAL
Status: DISCONTINUED | OUTPATIENT
Start: 2025-07-19 | End: 2025-07-19 | Stop reason: HOSPADM

## 2025-07-18 RX ORDER — LIDOCAINE 40 MG/G
CREAM TOPICAL
Status: DISCONTINUED | OUTPATIENT
Start: 2025-07-18 | End: 2025-07-18 | Stop reason: HOSPADM

## 2025-07-18 RX ORDER — METOCLOPRAMIDE 10 MG/1
10 TABLET ORAL EVERY 6 HOURS PRN
Status: DISCONTINUED | OUTPATIENT
Start: 2025-07-18 | End: 2025-07-19 | Stop reason: HOSPADM

## 2025-07-18 RX ORDER — NICOTINE POLACRILEX 4 MG
15-30 LOZENGE BUCCAL
Status: DISCONTINUED | OUTPATIENT
Start: 2025-07-18 | End: 2025-07-19 | Stop reason: HOSPADM

## 2025-07-18 RX ORDER — DEXTROSE MONOHYDRATE 25 G/50ML
25-50 INJECTION, SOLUTION INTRAVENOUS
Status: DISCONTINUED | OUTPATIENT
Start: 2025-07-18 | End: 2025-07-19 | Stop reason: HOSPADM

## 2025-07-18 RX ORDER — PROCHLORPERAZINE MALEATE 10 MG
10 TABLET ORAL EVERY 6 HOURS PRN
Status: DISCONTINUED | OUTPATIENT
Start: 2025-07-18 | End: 2025-07-19 | Stop reason: HOSPADM

## 2025-07-18 RX ORDER — SIMETHICONE 80 MG
160 TABLET,CHEWABLE ORAL EVERY 6 HOURS PRN
Status: DISCONTINUED | OUTPATIENT
Start: 2025-07-18 | End: 2025-07-19 | Stop reason: HOSPADM

## 2025-07-18 RX ORDER — ACETAMINOPHEN 325 MG/1
650 TABLET ORAL EVERY 4 HOURS PRN
Status: DISCONTINUED | OUTPATIENT
Start: 2025-07-18 | End: 2025-07-19 | Stop reason: HOSPADM

## 2025-07-18 RX ORDER — ONDANSETRON 4 MG/1
4 TABLET, ORALLY DISINTEGRATING ORAL EVERY 6 HOURS PRN
Status: DISCONTINUED | OUTPATIENT
Start: 2025-07-18 | End: 2025-07-19 | Stop reason: HOSPADM

## 2025-07-18 RX ORDER — BETAMETHASONE SODIUM PHOSPHATE AND BETAMETHASONE ACETATE 3; 3 MG/ML; MG/ML
12 INJECTION, SUSPENSION INTRA-ARTICULAR; INTRALESIONAL; INTRAMUSCULAR; SOFT TISSUE EVERY 24 HOURS
Status: DISCONTINUED | OUTPATIENT
Start: 2025-07-18 | End: 2025-07-19 | Stop reason: HOSPADM

## 2025-07-18 RX ORDER — METOCLOPRAMIDE HYDROCHLORIDE 5 MG/ML
10 INJECTION INTRAMUSCULAR; INTRAVENOUS EVERY 6 HOURS PRN
Status: DISCONTINUED | OUTPATIENT
Start: 2025-07-18 | End: 2025-07-19 | Stop reason: HOSPADM

## 2025-07-18 RX ORDER — CALCIUM CARBONATE 500 MG/1
1000 TABLET, CHEWABLE ORAL EVERY 6 HOURS PRN
Status: DISCONTINUED | OUTPATIENT
Start: 2025-07-18 | End: 2025-07-19 | Stop reason: HOSPADM

## 2025-07-18 ASSESSMENT — ACTIVITIES OF DAILY LIVING (ADL)
ADLS_ACUITY_SCORE: 22

## 2025-07-18 NOTE — PROVIDER NOTIFICATION
25 1851   Provider Notification   Provider Name/Title Dr. Turcios, Dr. Mitchell   Method of Notification Electronic Page   Request Evaluate in Person   Notification Reason Patient Arrived     Data: Patient presented to [facility]: 2025  6:03 PM.  Reason for maternal/fetal assessment is extended monitoring. Patient reports being sent from clinic after a BPP 8/10, off for breathing, see Ultrasound note. Patient denies uterine contractions, leaking of vaginal fluid/rupture of membranes, vaginal bleeding, abdominal pain, pelvic pressure, nausea, vomiting, headache, visual disturbances, epigastric or RUQ pain, significant edema. Patient reports fetal movement is normal. Patient is a 32w5d .  Prenatal record reviewed. Pregnancy has been complicated by diabetes, hypertension, advanced maternal age (>=36yo), fetal growth restriction, and polyhydramnios .    Vital signs wnl. Support person is present.     Action: Verbal consent for EFM. Triage assessment completed.     Response: Patient verbalized agreement with plan. Will contact Dr. Turcios with update and further orders.

## 2025-07-19 VITALS
HEART RATE: 96 BPM | SYSTOLIC BLOOD PRESSURE: 124 MMHG | DIASTOLIC BLOOD PRESSURE: 58 MMHG | WEIGHT: 188 LBS | BODY MASS INDEX: 36.91 KG/M2 | TEMPERATURE: 98.1 F | RESPIRATION RATE: 16 BRPM | HEIGHT: 60 IN

## 2025-07-19 LAB
GLUCOSE BLDC GLUCOMTR-MCNC: 105 MG/DL (ref 70–99)
GLUCOSE BLDC GLUCOMTR-MCNC: 107 MG/DL (ref 70–99)

## 2025-07-19 PROCEDURE — 258N000003 HC RX IP 258 OP 636

## 2025-07-19 PROCEDURE — G0378 HOSPITAL OBSERVATION PER HR: HCPCS

## 2025-07-19 PROCEDURE — G0463 HOSPITAL OUTPT CLINIC VISIT: HCPCS

## 2025-07-19 PROCEDURE — 82962 GLUCOSE BLOOD TEST: CPT

## 2025-07-19 RX ORDER — SODIUM CHLORIDE, SODIUM LACTATE, POTASSIUM CHLORIDE, CALCIUM CHLORIDE 600; 310; 30; 20 MG/100ML; MG/100ML; MG/100ML; MG/100ML
INJECTION, SOLUTION INTRAVENOUS
Status: DISCONTINUED
Start: 2025-07-19 | End: 2025-07-19 | Stop reason: HOSPADM

## 2025-07-19 RX ADMIN — SODIUM CHLORIDE, SODIUM LACTATE, POTASSIUM CHLORIDE, AND CALCIUM CHLORIDE 1000 ML: .6; .31; .03; .02 INJECTION, SOLUTION INTRAVENOUS at 06:46

## 2025-07-19 RX ADMIN — SODIUM CHLORIDE, SODIUM LACTATE, POTASSIUM CHLORIDE, AND CALCIUM CHLORIDE 1000 ML: .6; .31; .03; .02 INJECTION, SOLUTION INTRAVENOUS at 01:02

## 2025-07-19 ASSESSMENT — ACTIVITIES OF DAILY LIVING (ADL)
ADLS_ACUITY_SCORE: 22

## 2025-07-19 NOTE — DISCHARGE SUMMARY
[facility] Discharge Summary    Lavelle Montana MRN# [id number]   Age: 40 year old YOB: 1985     Date of Admission:  2025  Date of Discharge:  2025  Admitting Physician:  Nannette Godoy MD  Discharge Physician:  Nannette Godoy MD     Admission Diagnosis:   - Late decelerations   - Fetal CHD   - FGR (EFW 1%)   - Polyhydramnios   - GDMA2   - Hx prior CS x2   - Hx PPH     Discharge Diagnosis:  - Same    Procedures:    - Extended fetal monitoring     Consultations:    - NICU     Medications prior to admission:  Medications Prior to Admission   Medication Sig Dispense Refill Last Dose/Taking    Prenatal Vit-Fe Fumarate-FA (PRENATAL MULTIVITAMIN  PLUS IRON) 27-1 MG TABS Take 1 tablet by mouth daily.   2025    NIFEdipine ER OSMOTIC (PROCARDIA XL) 30 MG 24 hr tablet Take 1 tablet by mouth daily at 2 pm. (Patient not taking: Reported on 7/15/2025)        Brief History of Presentation:    Lavelle Montana is a 40 year old , at 32w6d by 11w0d US, who presents with non-reassuring fetal heart tracing, admitted for extending monitoring.    Hospital Course:    Following hospital admission she was kept on continious fetal heart monitoring, with overall category I tracing throughout monitoring period with multiple late decelerations and isolated episode of prolonged decelerations. Her tocometer showed persistent contractions that the patient did not feel; she declined speculum exam and cervical check on admission. She had no episodes of abdominal or pelvic pain while in house. She was offered rescue betamethasone and declined.  After counseling and discussing options for continued for inpatient management, she elected to be discharged with plans to follow up outpatient with scheduled MFM ultrasounds and prenatal visits.    We discussed that their goals are to prolong pregnancy as much as can be done safely.  Looking ahead, they would like prolonged monitoring  only in cases where there is a significant change in fetal monitoring patterns (ie worsening dopplers or marked increase in decelerations).     They would also like to continue conversations related to timing of birth--their goal is to make it to 34 weeks and are willing to entertain scheduled birth prior to 37 weeks if indicated or before 34 weeks if there are significant changes in monitoring status.       Discharge Instructions:  Call or present to labor and delivery if you experience:   -Regular painful contractions concerning for labor   -Leakage of fluid concerning for ruptured membranes   -Decreased fetal movement   -Bright red vaginal bleeding    -Headache, vision changes, upper abdominal pain, significant increase in swelling,   generalized unwell feeling    Follow up:  Follow up in MFM clinic on 7/22/25 for ultrasound, 7/25/25 for office visit     Chen Mitchell MD MPH  Obstetric & Gynecology, PGY-3  July 19, 2025 , 10:28 AM        Physician Attestation   I saw and evaluated this patient prior to discharge.  I discussed the patient with the resident/fellow and agree with plan of care as documented in the note.      Nannette Godoy MD PhD  Department of Obstetrics, Gynecology and Women's Health  Maternal Fetal Medicine Division

## 2025-07-19 NOTE — PROGRESS NOTES
Orders to discharge home per Dr. Godoy. Patient verbalized understanding of education and agreement with plan. Discharge instructions reviewed. Patient instructed to report change in fetal movement, vaginal leaking of fluid or bleeding, abdominal pain, or any concerns related to the pregnancy to provider/clinic. Discharged to home at 1039.

## 2025-07-19 NOTE — PROGRESS NOTES
[facility]  OB Triage Note    CC: Fetal decelerations     HPI: Lavelle Montana is a 40 year old  at 32w5d by 11w0d, who presents from [facility] clinic for extended fetal monitoring in the setting of 8/10 BPP today with concern for decelerations. On presentation reports feeling overall well without new health concerns. She ***denies contractions, leaking fluid, vaginal bleeding.  + Good fetal movement***.    Obstetric Complications  - Fetal CHD   - FGR   - Polyhydramnios   - GDMA2   - Hx prior CS x2   - Hx PPH     O:  Patient Vitals for the past 24 hrs:   BP Temp Temp src Resp Height Weight   25 1833 116/61 98.1  F (36.7  C) Oral 16 1.524 m (5') 85.3 kg (188 lb)     Gen: Well-appearing, NAD***  CV: RRR, no murmurs***  Pulm: CTAB, no wheezes***  Abd: Soft, gravid***  Ext: *** LE edema b/l    Spec: ***  Cervix: ***    FHT: BL ***, *** oc, *** accels, *** decels  [place]: *** ctx in 10 mins    Labs:  ***    A/P:  Lavelle Montana is a 40 year old  at 32w5d by 11w0d US, here for extended monitoring in the setting of 8/10 BPP in clinic today. After *** hours of tracing *** she has been   - ***  #FWB: - Category *** FHT    Alex Turcios MD   Obstetrics & Gynecology, PGY-3  2025 8:10 PM

## 2025-07-19 NOTE — PROGRESS NOTES
Brief Interval Progress Note     MD to bedside in setting of prolonged deceleration lasting ~5 minutes to the 80s. On arrival to room patient supine in left side, moved to hands-and-knees with recovery of the fetal heart rate shortly thereafter to the 140-150s. We reviewed her tracing from overnight including prior episodes of decelerations lasting between 1-3 minutes, with this most recent episode being the longest. In this setting again reviewed the informed consent form for a repeat  section in the event of an emergency, which patient is ultimately agreeable to. Likewise consented for a blood transfusion. Given the evolving nature of her tracing did again strongly encouraged course of rescue betamethasone for fetal lung maturity in the event of need for early delivery. Patient would still like to consider and discuss further with her partner. In interim will make NPO this morning and continue to monitor FHT, would deliver for obstetric indications including fetal bradycardia/prolonged deceleration. Patient consented for RLTCS and blood transfusion, paper consents signed.     Alex Turcios MD   Obstetrics & Gynecology, PGY-3  2025 8:24 AM

## 2025-07-19 NOTE — H&P
[facility]  OB History and Physical      Lavelle Montana MRN# [id number]   Age: 40 year old YOB: 1985     CC: Decelerations     HPI:  Lavelle Montana is a 40 year old  at 32w5d by 11w0d, who presents from [facility] clinic for extended fetal monitoring in the setting of 10 BPP today with concern for decelerations. She has previously been admitted for similar concerns and completed course of betamethasone on -. On presentation reports feeling overall well without new health concerns. She denies contractions, leaking fluid, vaginal bleeding. Continues to feel active fetal movement. .     Her obstetric history is notable for two prior  sections performed at term; notes both deliveries were complicated by a postpartum hemorrhage, did not require blood with either.     Currently reports only medication is a prenatal vitamin. She has been checking blood sugars 1hr postprandially and before bedtime, estimates ~50% have been within goal. Not currently on insulin although was recently recommended to start per diabetes education given BG trend. No history of high high blood pressures, bleeding/clotting disorders, or asthma.     Pregnancy Complications:  - Fetal CHD   - FGR (EFW 1%)   - Polyhydramnios   - GDMA2   - Hx prior CS x2   - Hx PPH     Prenatal Labs:   Lab Results   Component Value Date    AS Negative 2025    HEPBANG Nonreactive 2025    HGB 11.0 (L) 2025       GBS Status:   Lab Results   Component Value Date    GBS Positive (A) 2023       OB History  OB History    Para Term  AB Living   3 2 2 0 0 2   SAB IAB Ectopic Multiple Live Births   0 0 0 0 2      # Outcome Date GA Lbr Matias/2nd Weight Sex Type Anes PTL Lv   3 Current            2 Term 23 39w0d  4.36 kg (9 lb 9.8 oz) M CS-Unspec Spinal N KYLEE      Name: SHELBIMALE-LAVELLE      Apgar1: 8  Apgar5: 9   1 Term 09/10/21 39w1d  3.34 kg (7 lb 5.8 oz) F CS-LTranv   KYLEE      Complications:  Hemorrhage      Name: BG RUTHIE MARIO      Apgar1: 3  Apgar5: 8       PMHx:   Past Medical History:   Diagnosis Date    Anxiety     Cervical stenosis (uterine cervix)     Endometriosis     Hypertension     Infertility, female      PSHx:   Past Surgical History:   Procedure Laterality Date     SECTION N/A 3/14/2023    Procedure: REPEAT  SECTION;  Surgeon: Caren Owusu MD;  Location[facility]    HYSTEROSCOPY      HYSTEROSCOPY DIAGNOSTIC N/A 10/30/2019    Procedure: HYSTEROSCOPY;  Surgeon: Caren Owusu MD;  Location: [facility];  Service: Gynecology    HYSTEROSCOPY DIAGNOSTIC N/A 2019    Procedure: HYSTEROSCOPY ULTRASOUND MANUAL DILATION OF CERVIX;  Surgeon: Caren Owusu MD;  Location: [facility];  Service: Gynecology    LE FORT ONE , OSTEOTOMY SAGITTAL SPLIT, COMBINED  2014    Procedure: COMBINED LE FORT ONE, OSTEOTOMY SAGITTAL SPLIT;  Surgeon: Javi Coello DDS;  Location: [facility]    OPERATIVE HYSTEROSCOPY N/A 9/10/2020    Procedure: cervical dilatation AND HYSTEROSCOPY;  Surgeon: Uri Baldwin MD;  Location:  OR    CA LAP,FULGURATE/EXCISE LESIONS Bilateral 10/30/2019    Procedure: LAPAROSCOPY BILATERAL OVARIAN CYSTECTOMIES;  Surgeon: Caren Owusu MD;  Location: [facility];  Service: Gynecology    CA LAP,FULGURATE/EXCISE LESIONS Bilateral 10/30/2019    Procedure: DAVINCI LAPAROSCOPY BILATERAL OVARIAN CYSTECTOMIES extensive adhesion lysis;  Surgeon: Caren Owusu MD;  Location: [facility];  Service: Gynecology     Meds:   Medications Prior to Admission   Medication Sig Dispense Refill Last Dose/Taking    Prenatal Vit-Fe Fumarate-FA (PRENATAL MULTIVITAMIN  PLUS IRON) 27-1 MG TABS Take 1 tablet by mouth daily.   2025    NIFEdipine ER OSMOTIC (PROCARDIA XL) 30 MG 24 hr tablet Take 1 tablet by mouth daily at 2 pm. (Patient not taking: Reported on 7/15/2025)        Allergies:  No Known Allergies    FmHx:   Family History   Problem Relation Age of Onset    Hyperlipidemia Father      SocHx:  She denies any tobacco, alcohol, or other drug use during this pregnancy.    PE:  Vit: Patient Vitals for the past 4 hrs:   BP Temp Temp src Pulse Resp   25 2313 131/72 98.5  F (36.9  C) Oral 96 16      Gen: Well-appearing, NAD, comfortable   CV: Well perfused, regular rate   Pulm: Breathing comfortably on room air   Abd: Soft, gravid, non-tender   Ext:  LE edema b/l  Cx: Deferred             FHT: Baseline 130, moderate variability, present accelerations , intermittent variable decelerations, prolonged decels x2 2145, 2152   [place]: 1-3 contractions in 10 minutes      Assessment/Plan:  Lavelle Montana is a 40 year old , at 32w6d by 11w0d US, who presents with non-reassuring fetal heart tracing, admitted for extending monitoring and rescue BMZ course.    # NRFHT   # FWB   # Severe FGR   Unclear etiology of apparent spontaneously occurring prolonged decelerations on monitoring. Given tracing concerns and broader context of FGR and fetal cardiac differences recommend admission for extended monitoring and rescue BMZ, the latter of which patient currently declines. Plan for extended monitoring overnight and will reassess tracing in morning to help guide next management steps.   - Cat II tracing, reactive; Cephalic by BSUS; EFW 1%   - S/p BMZ course -    - Continuous Fetal Monitoring with tocometry   - Rescue BMZ course for fetal lung maturity -- patient remains undecided, currently declines   - NICU consulted   - Intrauterine resuscitative measures PRN, fluid bolus on admission   - Anticipate US with umbilical artery Dopplers prior to discharge   - MOD: S/p consent for repeat  section, blood transfusion      # Fetal VSD   - Echocardiogram 7/15/25 without directly visualized VSD, nml aortic valve, normal left and right ventricular size and function   -  echocardiogram within 24-48hrs of life      # Gestational Diabetes   Patient with GDM diagnosis and follows with diabetes education as outpatient. Recently counseled on beginning insulin due to BG trend, however, patient has not started insulin. Checks her sugars multiple times daily at home and have largely been in the appropriate range. While admitted plan QID blood glucose checks and medium sliding scale insulin.   - Medium SSI  - Blood Glucose Checks:   - TID 1hr postprandial, at bedtime, AM fasting   - Discussed possibility for hyperglycemia following BMZ administration and resultant likely higher insulin requirement during post-treatment window, patient taking this into consideration as she makes final decision regarding rBMZ   - Endocrinology consult PRN     # PNC  - Rh positive, Rubella immune, GCT > GTT elevated, GBS unknown   - Other prenatal labs wnl  - Imaging: placenta anterior   - S/p TDaP   - Feeding: Plans breast     # PPH Risk:  -  High (hx PPH x2)- IV, type and cross x2 units, uterotonics in room for delivery     The patient was discussed with Dr. Godoy who is in agreement with the treatment plan.    Alex Turcios MD   Obstetrics & Gynecology, PGY-3  07/19/2025 12:48 AM    Attending attestation:     I discussed the care of Ms. Montana overnight and the tracing concerns.  Agree with plan as noted above.     Nannette Godoy MD PhD  Department of Obstetrics, Gynecology and Women's Health  Maternal Fetal Medicine Division

## 2025-07-19 NOTE — PROGRESS NOTES
CHEYM Progress Note.     With patient and spouse, we reviewed the concerns related to fetal development including known VSD, severe polyhydramnios (concern for potential trachea/esophageal difference, though not seen on US) and severe FGR. We discussed limitations of prenatal US to make complete diagnoses.  We also discussed that it is difficult to link fetal monitoring concerns over the last several weeks with these specific concerns.       We also reviewed the multiple prolonged decelerations overnight including some to 80s lasting 5 minutes that seemed to represent an increase in frequency and intensity.  She notes that this has been an ongoing issue and represents her daughter's patterns.  She strongly desires to prolong pregnancy as long as things remain relatively stable.  Their goal is to make it to 34 weeks at least.     We reviewed option to continue inpatient monitoring, starting rescue betamethasone dose, meeting with NICU and proceeding with birth if recurrent late appearing decelerations.  She is not interested in birth at this time. She is also not interested in a rescue course of betamethasone due to concerns she has related to hypothetical neurodevelopmental impacts.      We did discuss the risk of not monitoring including risk of fetal death. However, she notes that these patterns today seem similar to prior episodes and she prefers outpatient management.  She feel regular fetal movement.     After discussion of options, she would like to go home today and follow up in clinic on Tuesday as scheduled for UA dopplers, NST and BPP.  She strongly not having prolonged monitoring for a single decel that returns to baseline spontaneously . Rather, she will consider more intervention/monitoring if there is a more significant change in monitoring (like abnormal dopplers).  She is also open to scheduled repeat C section prior to current 37 week recommendation.     At the end of conversation, all ?s answered.   Will plan to discharge home.     Nannette Godoy MD PhD  Department of Obstetrics, Gynecology and Women's Health  Maternal Fetal Medicine Division    65 MINUTES SPENT BY ME on the date of service doing (excluding interpretation of the ultrasound) chart review, history, exam, counseling,  care coordination, documentation & further activities per the note.

## 2025-07-19 NOTE — PLAN OF CARE
Patient admitted overnight for observation for extended monitoring d/t intermittent decelerations. Fetus had decelerations in the evening that resolved overnight. Patient then had episodic deceleration x1 and prolonged decelerations x2 this morning, Dr. Turcios to bedside. FHR back to baseline with position changes. Fluid bolus given in the evening for increased uterine activity and additional bolus was given this morning for the decelerations. Betamethasone was declined by patient at this time. VSS. EFM as charted. Continue with plan of care.

## 2025-07-21 ENCOUNTER — VIRTUAL VISIT (OUTPATIENT)
Dept: EDUCATION SERVICES | Facility: CLINIC | Age: 40
End: 2025-07-21
Payer: COMMERCIAL

## 2025-07-21 ENCOUNTER — TELEPHONE (OUTPATIENT)
Dept: EDUCATION SERVICES | Facility: CLINIC | Age: 40
End: 2025-07-21

## 2025-07-21 ENCOUNTER — TELEPHONE (OUTPATIENT)
Dept: ENDOCRINOLOGY | Facility: CLINIC | Age: 40
End: 2025-07-21

## 2025-07-21 DIAGNOSIS — O24.419 GESTATIONAL DIABETES: Primary | ICD-10-CM

## 2025-07-21 PROCEDURE — G0108 DIAB MANAGE TRN  PER INDIV: HCPCS | Mod: 95 | Performed by: DIETITIAN, REGISTERED

## 2025-07-21 RX ORDER — HUMAN INSULIN 100 [IU]/ML
12 INJECTION, SUSPENSION SUBCUTANEOUS AT BEDTIME
Qty: 15 ML | Refills: 1 | Status: SHIPPED | OUTPATIENT
Start: 2025-07-21

## 2025-07-21 NOTE — PATIENT INSTRUCTIONS
"You can find helpful information and resources about gestational diabetes at [email or web address] or by scanning the QR code:      Taking Insulin:    1. Take NPH (Humulin-N or Novolin-N) - 12 units at bedtime.     - Rotate injection sites, keeping at least 1 inch apart from last injection site and 2 inches away from belly button or surgical scars.    -If you have a cloudy insulin, mix the insulin gently by rolling between your hands 10 times and turning upside down and right side up 10 times. Do not shake.     2. Pen - Use a new pen needle for each injection. Always remove pen needle from the insulin pen after use and do not store insulin pens with the needle on the pen. Do a 2 unit \"prime\" before each injection, be sure a drop or stream of insulin comes out of the needle before you give your injection. After you inject, hold the needle under the skin to the count of 10 to be sure all of the insulin goes in.      3. Store insulin you are not using in the refrigerator (do not freeze). Take new insulin out of the refrigerator a few hours prior to use to bring to room temperature.     4. Once opened NPH Novolin N FlexPens can be kept at room temperature for 28 days. Do not use the opened insulin after this time has passed, even if there is still medicine inside.     As a reminder, with any dose adjustment to insulin, the risk for a low blood sugar event (hypoglycemia) is possible. Common symptoms of low blood sugar include shaky, sweaty, dizzy sensation. A low blood sugar would be anything under 63 in pregnancy. Please be prepared to treat a low sugar should it happen: treat with 15 grams of simple sugar (Ex: 1/2 cup juice or regular soda, small handful dried fruit, fruit snack pouch, fun size Skittles, Smarties, Jelly Beans). Allow 15 minutes for your sugar to improve. If it is still low, treat again. If it has normalized, have a small snack to get you through to your next meal.     [place] " Diabetes Education and Nutrition Services for the [facility]:  For Your Diabetes Education or Nutrition Appointments Call:  [phone]   For Diabetes or Nutrition Related Questions:   Send [employer] Message   If you need a medication refill please contact your pharmacy. Please allow 3 business days for your refills to be completed.

## 2025-07-21 NOTE — PROGRESS NOTES
Gestational Diabetes Self-Management Education & Support    Type of Service: Telephone Visit (scheduled as a video but having difficulties getting it to work)    Originating Location (Patient Location): Home  Distant Location (Provider Location): Offsite  Mode of Communication:  Telephone    Telephone Visit Start Time: 1:18 pm  Telephone Visit End Time (telephone visit stop time): 2:00 pm    How would patient like to obtain AVS? MyChart      Assessment  Ketones: -.   Fasting blood glucoses: 14% in target.  After breakfast: 50% in target.  After lunch: 100% in target.  After dinner: 75% in target.    Recommend NPH insulin be started at bedtime. She has already been instructed on the meal plan and BG monitoring at [facility]. Is now transitioning care to  OB and has first visit on 7/25.  Focused on insulin instruction today.     Wt Readings from Last 2 Encounters:   07/18/25 85.3 kg (188 lb)   05/29/25 80.7 kg (178 lb)     0.15 unit(s)/kg = 12.8    Intervention  Educational topics covered today:  Insulin injection technique taught using a Pen for NPH - 12 units at bedtime. Patient verbalized understanding of injection technique.  Discussed mixing insulin, storage, sharps, new needle each use, site selection, action of insulin and hypoglycemia signs, symptoms and treatment, when to call a Diabetes Educator or OB provider    Reviewed target blood glucose values, sharps disposal, and importance of good blood glucose management for health of mom and baby. Patient advised to call if 3 blood glucose elevated before returns next week. Instructed on ketone checking and told to call if unable to get ketones negative.       Did not have much time to review meal plan but encouraged more protein at bedtime snack.     Pt verbalized understanding of concepts discussed and recommendations provided.    Educational Materials provided today:  [place] Preventing Diabetes    Patient verbalized understanding of diabetes self-management  education concepts discussed, opportunities for ongoing education and support, and recommendations provided today    Plan  Begin NPH 12 unit(s) at bedtime (0.15 unit(s)/kg).   Check glucose four times daily, before breakfast and 1 hour after each meal.  Check ketones once a week when readings are consistently negative.  Continue with recommended physical activity.  Continue to follow recommended meal plan: 30-45g carbohydratess at breakfast, 45-60g carbohydratess at lunch, 45-60g carbohydrates at supper, 15-30g carbohydrates at snacks.  Follow consistent carbohydrate meal plan, eat carbohydrates and protein/fat at all meals/snacks.  Follow up 7/29 with Nayely scheduled.  Endocrinology scheduled 7/29.    If 3 or more blood sugars are above the goal at a given time, or if Ketones are small, moderate or large, call or MyChart message the diabetes educator.    Subjective/Objective  Lavelle is an 40 year old, presenting for the following diabetes education related to:          7/21/2025   General   Accompanied by Self    Self   Diabetes management related comments/concerns No    Elevated fasting BGs. Baby is measuring on the small side. She wonders if having high blood sugars will help baby gain more weight.   Gestational weeks 33w1d    33w1d   [facility]   Next OB Visit Date 7/25/2025 7/25/2025   Number of previous pregnancies 2    2   Had any babies over 9 lbs Yes    Yes   Previously had Gestational Diabetes No    No   Have you ever had thyroid problems or taken thyroid medication? No   Heart disease, mitral valve prolapse or rheumatic fever? No   Hypertension  No   High Cholesterol No   High Triglycerides No   Do you use tobacco products? No    No   Do you drink beer, wine or hard liquor? Yes    No       Multiple values from one day are sorted in reverse-chronological order       Cultural Influences/Ethnic Background:  Not  or     LMP  2024       Estimated Date of Delivery: Sep 7, 2025    Blood Glucose/Ketone Log:     Date Ketones Fasting Post Breakfast Post Lunch Post Supper   7/15  103 (had a snack at 1:30 am) 140 126 117*     96 148 126 160 (ice cream with dinner)     96 137 129 134     95 175 (juice) At the hospital [facility]     93 112* 109* -     101 135 - 122     101 forgot             2025   Lifestyle and Health Behaviors   Pre-pregnancy weight (lbs) 175    175   Exercise: Currently not exercising   Barrier to exercise Time   Cultural/Moravian diet restrictions? No   Meal planning/habits Avoiding sweets;Low salt;Smaller portions;Frequent snacking    Carb counting   How many times a week on average do you eat food made away from home (restaurant/take-out)? 2   Meals include Breakfast;Lunch;Dinner;Morning Snack;Afternoon Snack;Evening Snack    Breakfast;Lunch;Dinner;Morning Snack;Afternoon Snack;Evening Snack   Breakfast rice, meat, veg    rice, meat, vegetables   Lunch rice, meat, veg    rice, meat, vegetables   Dinner rice, meat, veg    rice, meat, vegetables   Snacks fruit and bedtime snack is milk or yogurt    fruit usually and then bedtime is milk or yogurt   Beverages Water   How many servings of fruits/vegetables per day 4   Biggest challenges to healthy eating None    None   Pre- vitamin? Yes    Yes   Supplements? No   Experiencing nausea? No   Experiencing heartburn? No       Multiple values from one day are sorted in reverse-chronological order         2025   Healthy Coping   Emotional response to diabetes Ready to learn;Concern for health and well-being    Ready to learn;Concern for health and well-being   Informal Support system: Family;Friends;Parent;Partner   Stage of change ACTION (Actively working towards change)    ACTION (Actively working towards change)       Multiple values from one day are sorted in reverse-chronological order           2025   Current Management    Taking medications for gestational diabetes? No    No       Multiple values from one day are sorted in reverse-chronological order         7/21/2025   Education   Blood Glucose Meter Unknown    Unknown   How confident are you filling out medical forms by yourself: Somewhat    Not Assessed   Other concerns None    None       Multiple values from one day are sorted in reverse-chronological order       Romelia Law RDN, MACHELLE, [facility] [place][note 2]BRITTANIE  Time Spent: 42 minutes  Encounter Type: Individual     Diabetes medication dose changes were made via the [facility] Standing Orders under the patient's referring provider.

## 2025-07-21 NOTE — TELEPHONE ENCOUNTER
Patient reports she has changed OB providers.  Requesting new referral to diabetes education due to this change.   Glenda Enciso, MPH, RD, CDCES, LD 7/21/2025

## 2025-07-21 NOTE — LETTER
7/21/2025         RE: Lavelle Montana  [address] [place] [address]        Dear Colleague,    Thank you for referring your patient, Lavelle Montana, to the [facility] [place]. Please see a copy of my visit note below.      Gestational Diabetes Self-Management Education & Support    Type of Service: Telephone Visit (scheduled as a video but having difficulties getting it to work)    Originating Location (Patient Location): Home  Distant Location (Provider Location): Offsite  Mode of Communication:  Telephone    Telephone Visit Start Time: 1:18 pm  Telephone Visit End Time (telephone visit stop time): 2:00 pm    How would patient like to obtain AVS? MyChart      Assessment  Ketones: -.   Fasting blood glucoses: 14% in target.  After breakfast: 50% in target.  After lunch: 100% in target.  After dinner: 75% in target.    Recommend NPH insulin be started at bedtime. She has already been instructed on the meal plan and BG monitoring at [facility]. Is now transitioning care to  OB and has first visit on 7/25.  Focused on insulin instruction today.     Wt Readings from Last 2 Encounters:   07/18/25 85.3 kg (188 lb)   05/29/25 80.7 kg (178 lb)     0.15 unit(s)/kg = 12.8    Intervention  Educational topics covered today:  Insulin injection technique taught using a Pen for NPH - 12 units at bedtime. Patient verbalized understanding of injection technique.  Discussed mixing insulin, storage, sharps, new needle each use, site selection, action of insulin and hypoglycemia signs, symptoms and treatment, when to call a Diabetes Educator or OB provider    Reviewed target blood glucose values, sharps disposal, and importance of good blood glucose management for health of mom and baby. Patient advised to call if 3 blood glucose elevated before returns next week. Instructed on ketone checking and told to call if unable to get ketones negative.       Did not have much time to review meal plan but encouraged  more protein at bedtime snack.     Pt verbalized understanding of concepts discussed and recommendations provided.    Educational Materials provided today:  [place] Preventing Diabetes    Patient verbalized understanding of diabetes self-management education concepts discussed, opportunities for ongoing education and support, and recommendations provided today    Plan  Begin NPH 12 unit(s) at bedtime (0.15 unit(s)/kg).   Check glucose four times daily, before breakfast and 1 hour after each meal.  Check ketones once a week when readings are consistently negative.  Continue with recommended physical activity.  Continue to follow recommended meal plan: 30-45g carbohydratess at breakfast, 45-60g carbohydratess at lunch, 45-60g carbohydrates at supper, 15-30g carbohydrates at snacks.  Follow consistent carbohydrate meal plan, eat carbohydrates and protein/fat at all meals/snacks.  Follow up 7/29 with Nayely scheduled.  Endocrinology scheduled 7/29.    If 3 or more blood sugars are above the goal at a given time, or if Ketones are small, moderate or large, call or MyChart message the diabetes educator.    Subjective/Objective  Lavelle is an 40 year old, presenting for the following diabetes education related to:          7/21/2025   General   Accompanied by Self    Self   Diabetes management related comments/concerns No    Elevated fasting BGs. Baby is measuring on the small side. She wonders if having high blood sugars will help baby gain more weight.   Gestational weeks 33w1d    33w1d   [facility]ospital planned [facility] delivery [facility]   Next OB Visit Date 7/25/2025 7/25/2025   Number of previous pregnancies 2    2   Had any babies over 9 lbs Yes    Yes   Previously had Gestational Diabetes No    No   Have you ever had thyroid problems or taken thyroid medication? No   Heart disease, mitral valve prolapse or rheumatic fever? No   Hypertension  No   High Cholesterol No   High Triglycerides No    Do you use tobacco products? No    No   Do you drink beer, wine or hard liquor? Yes    No       Multiple values from one day are sorted in reverse-chronological order       Cultural Influences/Ethnic Background:  Not  or     LMP 2024       Estimated Date of Delivery: Sep 7, 2025    Blood Glucose/Ketone Log:     Date Ketones Fasting Post Breakfast Post Lunch Post Supper   7/15  103 (had a snack at 1:30 am) 140 126 117*     96 148 126 160 (ice cream with dinner)     96 137 129 134     95 175 (juice) At the hospital At [facility]     93 112* 109* -     101 135 - 122     101 forgot             2025   Lifestyle and Health Behaviors   Pre-pregnancy weight (lbs) 175    175   Exercise: Currently not exercising   Barrier to exercise Time   Cultural/Adventism diet restrictions? No   Meal planning/habits Avoiding sweets;Low salt;Smaller portions;Frequent snacking    Carb counting   How many times a week on average do you eat food made away from home (restaurant/take-out)? 2   Meals include Breakfast;Lunch;Dinner;Morning Snack;Afternoon Snack;Evening Snack    Breakfast;Lunch;Dinner;Morning Snack;Afternoon Snack;Evening Snack   Breakfast rice, meat, veg    rice, meat, vegetables   Lunch rice, meat, veg    rice, meat, vegetables   Dinner rice, meat, veg    rice, meat, vegetables   Snacks fruit and bedtime snack is milk or yogurt    fruit usually and then bedtime is milk or yogurt   Beverages Water   How many servings of fruits/vegetables per day 4   Biggest challenges to healthy eating None    None   Pre-fabien vitamin? Yes    Yes   Supplements? No   Experiencing nausea? No   Experiencing heartburn? No       Multiple values from one day are sorted in reverse-chronological order         2025   Healthy Coping   Emotional response to diabetes Ready to learn;Concern for health and well-being    Ready to learn;Concern for health and well-being   Informal Support system:  Family;Friends;Parent;Partner   Stage of change ACTION (Actively working towards change)    ACTION (Actively working towards change)       Multiple values from one day are sorted in reverse-chronological order           7/21/2025   Current Management   Taking medications for gestational diabetes? No    No       Multiple values from one day are sorted in reverse-chronological order         7/21/2025   Education   Blood Glucose Meter Unknown    Unknown   How confident are you filling out medical forms by yourself: Somewhat    Not Assessed   Other concerns None    None       Multiple values from one day are sorted in reverse-chronological order       Romelia Law, EDUARN, LD, [facility]  Time Spent: 42 minutes  Encounter Type: Individual     Diabetes medication dose changes were made via the [facility] Standing Orders under the patient's referring provider.

## 2025-07-21 NOTE — TELEPHONE ENCOUNTER
Patient confirmed scheduled appointment:  Date: 07/29/2025  Time: 12:00p virtual  Visit type: NEW GDM  Provider: Chen Cosme MD  Location: [facility] DIABETES  Testing/imaging: N/A  Additional notes:  Pt contacted and scheduled. Pt already scheduled for CDE visit this week in group zoom appt.     Rhoda Durbin RN  [id number]  Please help schedule per GDM protocol. CDE referral in place.    Grover Loyd on 7/21/2025 at 11:10 AM

## 2025-07-21 NOTE — PROGRESS NOTES
Outcome for 07/21/25 12:40 PM: [employer] message sent  Oscar Millard MA  Outcome for 07/25/25 8:56 AM: Per patient, will send BG readings via [employer] on Monday 7/28.   Oscar Millard MA  Outcome for 07/28/25 9:31 AM: Glucose readings sent via [employer]  Suzy Iqbal MA

## 2025-07-22 ENCOUNTER — ANCILLARY PROCEDURE (OUTPATIENT)
Dept: ULTRASOUND IMAGING | Facility: HOSPITAL | Age: 40
End: 2025-07-22
Attending: OBSTETRICS & GYNECOLOGY
Payer: COMMERCIAL

## 2025-07-22 ENCOUNTER — OFFICE VISIT (OUTPATIENT)
Dept: MATERNAL FETAL MEDICINE | Facility: HOSPITAL | Age: 40
End: 2025-07-22
Attending: OBSTETRICS & GYNECOLOGY
Payer: COMMERCIAL

## 2025-07-22 ENCOUNTER — HOSPITAL ENCOUNTER (OUTPATIENT)
Facility: CLINIC | Age: 40
Discharge: HOME | End: 2025-07-23
Attending: STUDENT IN AN ORGANIZED HEALTH CARE EDUCATION/TRAINING PROGRAM | Admitting: STUDENT IN AN ORGANIZED HEALTH CARE EDUCATION/TRAINING PROGRAM
Payer: COMMERCIAL

## 2025-07-22 VITALS — HEART RATE: 99 BPM | DIASTOLIC BLOOD PRESSURE: 73 MMHG | SYSTOLIC BLOOD PRESSURE: 118 MMHG | OXYGEN SATURATION: 96 %

## 2025-07-22 DIAGNOSIS — O28.3 ABNORMAL PRENATAL ULTRASOUND: ICD-10-CM

## 2025-07-22 DIAGNOSIS — O36.5990 PREGNANCY AFFECTED BY FETAL GROWTH RESTRICTION: ICD-10-CM

## 2025-07-22 DIAGNOSIS — O40.3XX0 POLYHYDRAMNIOS, THIRD TRIMESTER, NOT APPLICABLE OR UNSPECIFIED: Primary | ICD-10-CM

## 2025-07-22 DIAGNOSIS — O09.523 MULTIGRAVIDA OF ADVANCED MATERNAL AGE IN THIRD TRIMESTER: ICD-10-CM

## 2025-07-22 DIAGNOSIS — O28.3 ABNORMAL PRENATAL ULTRASOUND: Primary | ICD-10-CM

## 2025-07-22 DIAGNOSIS — O47.03 PRETERM UTERINE CONTRACTIONS IN THIRD TRIMESTER, ANTEPARTUM: ICD-10-CM

## 2025-07-22 PROCEDURE — G0463 HOSPITAL OUTPT CLINIC VISIT: HCPCS | Mod: 25

## 2025-07-22 PROCEDURE — 76818 FETAL BIOPHYS PROFILE W/NST: CPT | Mod: 26 | Performed by: OBSTETRICS & GYNECOLOGY

## 2025-07-22 PROCEDURE — 99207 PR NO CHARGE LOS: CPT

## 2025-07-22 PROCEDURE — 3074F SYST BP LT 130 MM HG: CPT | Performed by: OBSTETRICS & GYNECOLOGY

## 2025-07-22 PROCEDURE — 999N000069 HC STATISTIC GENETIC COUNSELING, < 16 MIN

## 2025-07-22 PROCEDURE — 59025 FETAL NON-STRESS TEST: CPT | Mod: 6MC

## 2025-07-22 PROCEDURE — 76819 FETAL BIOPHYS PROFIL W/O NST: CPT

## 2025-07-22 PROCEDURE — 3078F DIAST BP <80 MM HG: CPT | Performed by: OBSTETRICS & GYNECOLOGY

## 2025-07-22 PROCEDURE — 99214 OFFICE O/P EST MOD 30 MIN: CPT | Mod: 25 | Performed by: OBSTETRICS & GYNECOLOGY

## 2025-07-22 ASSESSMENT — ACTIVITIES OF DAILY LIVING (ADL)
ADLS_ACUITY_SCORE: 22
ADLS_ACUITY_SCORE: 22

## 2025-07-22 NOTE — PROGRESS NOTES
[facility] [place]  Genetic Counseling Consult    Patient: Lavelle Montana YOB: 1985   Date of Service: 25      Lavelle Montana was seen at [facility] for genetic consultation to discuss the options for  genetic testing given fetal growth restriction, VSD, and hirsutism.        Impression/Plan:   Lavelle was reviewed on today's weekly MFM/pediatric genetics case conference by [facility]. The pediatric genetics team recommended an inpatient consult and possible genome sequencing.    Today, Ka and I reviewed option of  testing and inpatient or outpatient genetics consultation depending on baby's hospital course. We reviewed that the pediatric genetics team will perform a physical exam of Lavelle's baby and discuss additional genetic testing options. Lavelle was amenable to having a pediatric genetics consultation. Order for inpatient consult placed in fetal chart. Camryn Harden, inpatient GC, added to care team and will be notified when Lavelle is admitted.    Lavelle has already met with genetic counseling previously during the pregnancy to discuss fetal grow restriction. Please see corresponding documentation for complete details. Scope of today's session was limited due to the patient experiencing contractions.    It was a pleasure to be involved with Lavelle 's care. I spent 15 minutes on the date of the encounter doing chart review, obtaining history, test coordination, documentation, and further activities as noted above.    Blossom Higgins MS, [facility]  Maternal Fetal Medicine  Office: [phone]  [facility]: [phone]   Fax: [phone]  [facility]

## 2025-07-22 NOTE — NURSING NOTE
Pt placed on NST following 6/8 on BPP and pt concerns of ctxs.  Pt reluctant to be evaluated at [facility] of  feeling that ctxs may be diminishing.  NST reviewed with Dr Shaikh and pt discharge to home to present at the U with any future concerns. Johana Morrissey RN

## 2025-07-22 NOTE — CONFIDENTIAL NOTE
RECORDS RECEIVED FROM: internal    DATE RECEIVED: 7.29.25   NOTES (FOR ALL VISITS) STATUS DETAILS   OFFICE NOTES from referring provider internal  Phoebe Dallas MD    OFFICE NOTES from other specialist Internal  7.21.25 [place]   7/18/25 [facility]   7.15.25 OB/gyn    Received 7.10.25 [facility]     MEDICATION LIST internal     LABS     DIABETES: HBGA1C, CREATININE, FASTING LIPIDS, MICROALBUMIN URINE, POTASSIUM, TSH, T4    THYROID: TSH, T4, CBC, THYRODLONULIN, TOTAL T3, FREE T4, CALCITONIN, CEA internal  Glucose meter- 7/19/25  Cbc- 7/18/25  HBGA1C- 2.18.25  TSH/T4- 5.16.24  Lipid- 5.16.24

## 2025-07-22 NOTE — NURSING NOTE
Lavelle Montana is a  at 33w2d who presents to [facility] for BPP for multiple fetal anomalies. Pt reports positive fetal movement. Pt denies bldg/lof/change in discharge, headache, vision changes, chest pain/SOB or edema. Pt comes in today concerned about ctxs every 8-30 min that are strong enough to have her worried and more frequent than she has experienced in this pregnancy.  Pt reports that her cervix was checked at 32weeks and was closed at that time.  SBAR given to Dr. Shaikh, see note in Epic.

## 2025-07-23 VITALS
SYSTOLIC BLOOD PRESSURE: 125 MMHG | OXYGEN SATURATION: 97 % | RESPIRATION RATE: 16 BRPM | DIASTOLIC BLOOD PRESSURE: 66 MMHG | TEMPERATURE: 98 F

## 2025-07-23 PROCEDURE — G0463 HOSPITAL OUTPT CLINIC VISIT: HCPCS

## 2025-07-23 NOTE — PROGRESS NOTES
[facility]  OB Triage Note    CC: contractions    HPI: Lavelle Montana is a 40 year old  at 33w2d by LMP c/w 11w0d, who presents with painful contractions. She reports they are occurring 2-3 times per hour. She reported them at her M US today and was advised to come in for evaluation but preferred to go home and try hydration first. She went home but they persisted. Given her las admission was for decelerations related to her contractions, she became worried about her baby and came in. On arrival she reports 2-3 painful contractions per hour. Denies, leaking fluid, vaginal bleeding.  + Good fetal movement. She denies fevers, chills, nausea, vomiting, diarrhea, changes to vaginal discharge, feeling unwell.     Obstetric Complications  - Fetal CHD   - FGR (EFW 1%)   - Polyhydramnios   - GDMA2   - Hx prior CS x2   - Hx PPH     O:  Patient Vitals for the past 24 hrs:   BP Temp Temp src Resp   25 2132 125/66 98  F (36.7  C) Oral 16     Gen: Well-appearing, NAD  CV: Well perfused, regular rate  Pulm: Normal work of breathing on room air  Abd: Soft, gravid  Ext: trace LE edema b/l    Cervix: C/L/H    FHT: , mod oc, + accels, ~2.5 minute decel followed by ~4.5 minute deceleration near time of arrival  FHT: , mod oc, + accels, - decels at time of discharge  [place]: 1 ctx over 2+ hours of monitoring    Labs:  none    A/P:  Lavelle Montana is a 40 year old  at 33w2d by LMP c/w 11w0d US here with painful contractions who then had a ~2 minute deceleration when being laid flat back followed by a ~4.5 minute spontaneous deceleration which resolved with various position changes.    # NRFHT   # FWB   # Severe FGR   # Severe polyhydramnios  Patient has had prior admission for prolonged decelerations of unclear etiology. Given the duration of this deceleration, would recommend 4 hours of monitoring. Her and her partner are uncertain about staying that duration as they report that all prior  decelerations have resolved. Affirmed that we are very relieved that all decelerations have resolved and we hope they continue to, but that the history of decelerations is more worrisome rather than more reassuring. Discussed that an 8 minute deceleration without any improvement would result in us rushing to the OR emergently to gather anesthesia, an attending surgeon, and then regroup. Counseled on risk of stillbirth with undetected terminal bradycardia at home and that fetal heart monitoring is the best way to assess for fetal stress at this time. Discussed that the growth restriction and excess fluid both indicate placental dysfunction which may be causing fetal stress and that is it difficult to assess or predict when the fetus will stop coping with this unknown stress. Affirmed that duration of monitoring or decision to leave is ultimately up to her. Four hours of monitoring was recommended but after 2 hours she elected to leave as she felt that the decelerations were likely to keep resolving if they were to occur again.  - Cat II tracing with prolonged decelerations on arrival  - Cat I and reactive at time of discharge  - S/p [facility] course 6/16-6/17         # Gestational Diabetes   Patient with GDM diagnosis and follows with diabetes education as outpatient. Reports she got her prescription for insulin today but has not picked it up or started it. Encouraged her to pick this up later today       Fran Tapia MD  Obstetrics & Gynecology, PGY-3  07/22/2025 10:41 PM

## 2025-07-23 NOTE — PROVIDER NOTIFICATION
07/22/25 2205   Provider Notification   Provider Name/Title Dr. Tapia, G3   Method of Notification At Bedside   Notification Reason Decels     Pt positioned spine for cervical check, prolonged fetus decel noted from 2205 to 2011. Dr. Tapia and RN at bedside. FHR back to baseline with position changes. Extended monitoring recommended by Dr. Tapia. Pt agreeable with plan. VSS

## 2025-07-23 NOTE — DISCHARGE INSTRUCTIONS
Data: Patient assessed in the [facility] for R/O  LABOR. Cervix 0 cm dilated and   effaced. Fetal station  . Membranes intact. Contractions are not present. See flowsheets for fetal assessment documentation.     Action: Presumed adequate fetal oxygenation documented. Discharge instructions reviewed. Patient instructed to report change in fetal movement, vaginal leaking of fluid or bleeding, abdominal pain, or any concerns related to the pregnancy to provider/clinic.      Response: Orders to discharge home per DR JANE. Patient verbalized understanding of education and agreement with plan. Discharged to home at 0035.

## 2025-07-23 NOTE — PLAN OF CARE
Data: Patient presented to [facility]: 2025  9:20 PM.  Reason for maternal/fetal assessment is uterine contractions. Patient reports frequent contraction that started yesterday 25. Patient denies leaking of vaginal fluid/rupture of membranes, vaginal bleeding, pelvic pressure, nausea, vomiting, headache, visual disturbances, epigastric or RUQ pain. Patient reports fetal movement is normal. Patient is a 33w2d .  Prenatal record reviewed. Pregnancy has been complicated by polyhydrannios, GDM, cHTN and fetal CHD.    Vital signs wnl. Support person is present.     Action: Verbal consent for EFM. Triage assessment completed.     Response: Patient verbalized agreement with plan. Will contact Dr. Romelia Bedolla, G2 with update and further orders.

## 2025-07-23 NOTE — PROVIDER NOTIFICATION
07/23/25 0017   Provider Notification   Provider Name/Title DR JANE   Method of Notification In Department   Notification Reason Other (Comment)  (PT DESIRES DISCHARGE)   Comments   Nursing Comments PT REQUESTS DISCHARGE &OFF EFM TO GO TO BATHROOM

## 2025-07-23 NOTE — PLAN OF CARE
Data: Patient assessed in the [facility] for uterine contractions. Cervix 0 cm dilated and   effaced. Fetal station  . Membranes intact. Contractions are not present. See flowsheets for fetal assessment documentation.     Action: Presumed adequate fetal oxygenation documented. Discharge instructions reviewed. Patient instructed to report change in fetal movement, vaginal leaking of fluid or bleeding, abdominal pain, or any concerns related to the pregnancy to provider/clinic.      Response: Orders to discharge home per DR JANE Patient verbalized understanding of education and agreement with plan. Discharged to home at 0035 WITH PARTNER, AMBULATORY.

## 2025-07-23 NOTE — PROVIDER NOTIFICATION
07/23/25 0035   Provider Notification   Provider Name/Title DR JANE   Method of Notification At Bedside   Notification Reason Other (Comment)  (DISCUSSED RECOMMENDED POC, PT TO DC HOME AMBULATORY WITH PARTNER)

## 2025-07-25 ENCOUNTER — ANCILLARY PROCEDURE (OUTPATIENT)
Dept: ULTRASOUND IMAGING | Facility: HOSPITAL | Age: 40
End: 2025-07-25
Attending: OBSTETRICS & GYNECOLOGY
Payer: COMMERCIAL

## 2025-07-25 ENCOUNTER — PRENATAL OFFICE VISIT (OUTPATIENT)
Dept: OBGYN | Facility: CLINIC | Age: 40
End: 2025-07-25
Payer: COMMERCIAL

## 2025-07-25 VITALS
BODY MASS INDEX: 36.99 KG/M2 | WEIGHT: 189.4 LBS | DIASTOLIC BLOOD PRESSURE: 77 MMHG | SYSTOLIC BLOOD PRESSURE: 119 MMHG | HEART RATE: 112 BPM

## 2025-07-25 DIAGNOSIS — O36.5990 PREGNANCY AFFECTED BY FETAL GROWTH RESTRICTION: ICD-10-CM

## 2025-07-25 DIAGNOSIS — O24.414 INSULIN CONTROLLED GESTATIONAL DIABETES MELLITUS (GDM) IN THIRD TRIMESTER: Primary | ICD-10-CM

## 2025-07-25 PROCEDURE — 90715 TDAP VACCINE 7 YRS/> IM: CPT | Performed by: OBSTETRICS & GYNECOLOGY

## 2025-07-25 PROCEDURE — 3074F SYST BP LT 130 MM HG: CPT | Performed by: OBSTETRICS & GYNECOLOGY

## 2025-07-25 PROCEDURE — 90471 IMMUNIZATION ADMIN: CPT | Performed by: OBSTETRICS & GYNECOLOGY

## 2025-07-25 PROCEDURE — 76819 FETAL BIOPHYS PROFIL W/O NST: CPT

## 2025-07-25 PROCEDURE — 0500F INITIAL PRENATAL CARE VISIT: CPT | Performed by: OBSTETRICS & GYNECOLOGY

## 2025-07-25 PROCEDURE — 3078F DIAST BP <80 MM HG: CPT | Performed by: OBSTETRICS & GYNECOLOGY

## 2025-07-25 PROCEDURE — 99203 OFFICE O/P NEW LOW 30 MIN: CPT | Mod: 25 | Performed by: OBSTETRICS & GYNECOLOGY

## 2025-07-25 NOTE — PROGRESS NOTES
"Here after MFM with growth at 2% and AC<1% now and elevated dopplers. Discussed plan for repeat c/s delivery 34-37 wks. She would like this soon and well aware baby will need NICU stay with early gestation, FGR and GDM. Declines rescue betamethasone and worried about healing after this c/s since had wound open last time with packing for weeks. Wants scar revision as this is pulling as well. Had \"extra bleeding\" with each of the prior c/s so worried about that. Plans on bottle feeding and using condoms for birthcontrol. Will schedule c/s in the 35th week and this was scheduled for 8/5 per pt request. Aware if dopplers change may need delivery sooner. Will monitor fetal Repeat c/s was discussed in detail including risk of bleeding, infection, damage to abdominal organs including bowel, bladder, blood vessels, nerves, and baby.   Recovery period/hosptial stay and restrictions discussed.  Pain medications after surgery were discussed.  All questions were answered.  BE    "

## 2025-07-29 ENCOUNTER — VIRTUAL VISIT (OUTPATIENT)
Dept: ENDOCRINOLOGY | Facility: CLINIC | Age: 40
End: 2025-07-29
Attending: OBSTETRICS & GYNECOLOGY
Payer: COMMERCIAL

## 2025-07-29 ENCOUNTER — ALLIED HEALTH/NURSE VISIT (OUTPATIENT)
Dept: EDUCATION SERVICES | Facility: CLINIC | Age: 40
End: 2025-07-29
Payer: COMMERCIAL

## 2025-07-29 ENCOUNTER — TELEPHONE (OUTPATIENT)
Dept: OBGYN | Facility: CLINIC | Age: 40
End: 2025-07-29

## 2025-07-29 ENCOUNTER — ANCILLARY PROCEDURE (OUTPATIENT)
Dept: ULTRASOUND IMAGING | Facility: HOSPITAL | Age: 40
End: 2025-07-29
Attending: STUDENT IN AN ORGANIZED HEALTH CARE EDUCATION/TRAINING PROGRAM
Payer: COMMERCIAL

## 2025-07-29 ENCOUNTER — OFFICE VISIT (OUTPATIENT)
Dept: MATERNAL FETAL MEDICINE | Facility: HOSPITAL | Age: 40
End: 2025-07-29
Attending: STUDENT IN AN ORGANIZED HEALTH CARE EDUCATION/TRAINING PROGRAM
Payer: COMMERCIAL

## 2025-07-29 ENCOUNTER — PRE VISIT (OUTPATIENT)
Dept: ENDOCRINOLOGY | Facility: CLINIC | Age: 40
End: 2025-07-29

## 2025-07-29 VITALS
OXYGEN SATURATION: 99 % | SYSTOLIC BLOOD PRESSURE: 110 MMHG | HEART RATE: 98 BPM | DIASTOLIC BLOOD PRESSURE: 73 MMHG | RESPIRATION RATE: 14 BRPM

## 2025-07-29 DIAGNOSIS — O36.5990 PREGNANCY AFFECTED BY FETAL GROWTH RESTRICTION: ICD-10-CM

## 2025-07-29 DIAGNOSIS — O24.414 INSULIN CONTROLLED GESTATIONAL DIABETES MELLITUS (GDM) IN THIRD TRIMESTER: ICD-10-CM

## 2025-07-29 DIAGNOSIS — O24.419 GESTATIONAL DIABETES: Primary | ICD-10-CM

## 2025-07-29 DIAGNOSIS — O09.529 SUPERVISION OF HIGH-RISK PREGNANCY OF ELDERLY MULTIGRAVIDA: ICD-10-CM

## 2025-07-29 DIAGNOSIS — O35.9XX0 SUSPECTED FETAL ANOMALY, ANTEPARTUM, SINGLE OR UNSPECIFIED FETUS: ICD-10-CM

## 2025-07-29 DIAGNOSIS — O36.5990 PREGNANCY AFFECTED BY FETAL GROWTH RESTRICTION: Primary | ICD-10-CM

## 2025-07-29 DIAGNOSIS — Z01.818 PRE-OP EXAM: Primary | ICD-10-CM

## 2025-07-29 PROCEDURE — 76815 OB US LIMITED FETUS(S): CPT | Mod: 26 | Performed by: STUDENT IN AN ORGANIZED HEALTH CARE EDUCATION/TRAINING PROGRAM

## 2025-07-29 PROCEDURE — 76820 UMBILICAL ARTERY ECHO: CPT | Mod: 26 | Performed by: STUDENT IN AN ORGANIZED HEALTH CARE EDUCATION/TRAINING PROGRAM

## 2025-07-29 PROCEDURE — 59025 FETAL NON-STRESS TEST: CPT

## 2025-07-29 PROCEDURE — 76820 UMBILICAL ARTERY ECHO: CPT

## 2025-07-29 PROCEDURE — 98001 SYNCH AUDIO-VIDEO NEW LOW 30: CPT | Performed by: INTERNAL MEDICINE

## 2025-07-29 PROCEDURE — 59025 FETAL NON-STRESS TEST: CPT | Mod: 26 | Performed by: STUDENT IN AN ORGANIZED HEALTH CARE EDUCATION/TRAINING PROGRAM

## 2025-07-29 PROCEDURE — 1126F AMNT PAIN NOTED NONE PRSNT: CPT | Mod: 95 | Performed by: INTERNAL MEDICINE

## 2025-07-29 PROCEDURE — G0108 DIAB MANAGE TRN  PER INDIV: HCPCS | Performed by: DIETITIAN, REGISTERED

## 2025-07-29 NOTE — LETTER
2025       RE: Lavelle Montana  [address] [place] [address]     Dear Colleague,    Thank you for referring your patient, Lavelle Montana, to the [facility] [place] at [facility]. Please see a copy of my visit note below.    Outcome for 25 12:40 PM: [employer] message sent  Oscar Millard MA  Outcome for 25 8:56 AM: Per patient, will send BG readings via [employer] on .   Oscar Millard MA  Outcome for 25 9:31 AM: Glucose readings sent via [employer]  Suzy Iqbal MA          Video-Visit Details    Type of service:  Video Visit    Virtual visit conducted by Jax.      Originating Location (pt. Location): HOME    Distant Location (provider location):  Off site    Mode of Communication:  Video Conference via [employer]    Physician has received verbal consent for a Video Visit from the patient? YES      Chen Cosme MD     25    Lavelle is a 40 year old female presents today for evaluation of gestational diabetes in the setting of pregnancy    HPI  #1 gestational diabetes  2025 hemoglobin A1c of 5.5.  The patient is currently pregnant.  2025 fasting glucose 100, 1 hour glucose 183, 2-hour glucose at 153.  The patient was started on NPH one-shot at night and is currently taking 12 units prior to bedtime.  However, over the last few days, she has reported hypoglycemia during the day with blood sugars at lunch roughly 150-190.    Reports history of 2 previous pregnancies (delivery in , ) which was not complicated by gestational pregnancy.  Has gained about 15 pounds over the course of the current pregnancy.  Patient denies any family history of diabetes.    #2 current pregnancy  The patient is currently 34 weeks.  Anticipates delivery next week by .  Reports she has gained 15 pounds.  Anticipates formula feeding.  Is interested in possibly 1 more pregnancy in the future.    Past  Medical History  Past Medical History:   Diagnosis Date     Anxiety      Cervical stenosis (uterine cervix)      Endometriosis      Hypertension      Infertility, female        Allergies  No Known Allergies  Medications  Current Outpatient Medications   Medication Sig Dispense Refill     acetone urine (KETOSTIX) test strip Test daily. When negative for 1 week, can reduce testing to once weekly. 50 strip 1     insulin NPH (NOVOLIN N FLEXPEN RELION) 100 UNIT/ML injection Inject 12 Units subcutaneously at bedtime. 15 mL 1     insulin pen needle (32G X 4 MM) 32G X 4 MM miscellaneous Use 1 pen needles daily or as directed. 100 each 2     Prenatal Vit-Fe Fumarate-FA (PRENATAL MULTIVITAMIN  PLUS IRON) 27-1 MG TABS Take 1 tablet by mouth daily.       Family History  family history includes Hyperlipidemia in her father.  Social History  Social History     Socioeconomic History     Marital status: Single     Spouse name: Not on file     Number of children: Not on file     Years of education: Not on file     Highest education level: Not on file   Occupational History     Not on file   Tobacco Use     Smoking status: Never     Passive exposure: Current     Smokeless tobacco: Never     Tobacco comments:      smokes    Vaping Use     Vaping status: Never Used   Substance and Sexual Activity     Alcohol use: Not Currently     Comment: on weekends     Drug use: Never     Sexual activity: Yes     Partners: Male     Birth control/protection: None   Other Topics Concern     Parent/sibling w/ CABG, MI or angioplasty before 65F 55M? Not Asked   Social History Narrative     Not on file     Social Drivers of Health     Financial Resource Strain: Low Risk  (7/22/2025)    Financial Resource Strain      Within the past 12 months, have you or your family members you live with been unable to get utilities (heat, electricity) when it was really needed?: No   Food Insecurity: Low Risk  (7/22/2025)    Food Insecurity      Within the past  "12 months, did you worry that your food would run out before you got money to buy more?: No      Within the past 12 months, did the food you bought just not last and you didn t have money to get more?: No   Transportation Needs: Low Risk  (7/22/2025)    Transportation Needs      Within the past 12 months, has lack of transportation kept you from medical appointments, getting your medicines, non-medical meetings or appointments, work, or from getting things that you need?: No   Physical Activity: Unknown (5/16/2024)    Exercise Vital Sign      Days of Exercise per Week: 1 day      Minutes of Exercise per Session: Not on file   Stress: Stress Concern Present (5/16/2024)    [employer] - Occupational Stress Questionnaire      Feeling of Stress : To some extent   Social Connections: Unknown (5/16/2024)    Social Connection and Isolation Panel [NHANES]      Frequency of Communication with Friends and Family: Not on file      Frequency of Social Gatherings with Friends and Family: Once a week      Attends [employer] Services: Not on file      Active Member of Clubs or Organizations: Not on file      Attends Club or Organization Meetings: Not on file      Marital Status: Not on file   Interpersonal Safety: Low Risk  (7/18/2025)    Interpersonal Safety      Do you feel physically and emotionally safe where you currently live?: Yes      Within the past 12 months, have you been hit, slapped, kicked or otherwise physically hurt by someone?: No      Within the past 12 months, have you been humiliated or emotionally abused in other ways by your partner or ex-partner?: No   Housing Stability: Low Risk  (7/22/2025)    Housing Stability      Do you have housing? : Yes      Are you worried about losing your housing?: No       ROS  Per HPI      Physical Exam  LMP 12/01/2024   There is no height or weight on file to calculate BMI.    GENERAL: Healthy, alert and no distress\",\"EYES: Eyes grossly normal to " "inspection.  No discharge or erythema, or obvious scleral/conjunctival abnormalities.\",\"RESP: No audible wheeze, cough, or visible cyanosis.  No visible retractions or increased work of breathing.  \",\"SKIN: Visible skin clear. No significant rash, abnormal pigmentation or lesions.\",\"NEURO: Cranial nerves grossly intact.  Mentation and speech appropriate for age.\",\"PSYCH: Mentation appears normal, affect normal/bright, judgement and insight intact, normal speech and appearance well-groomed.      RESULTS          ASSESSMENT:     #1 gestational diabetes with no prior history of diabetes  Given that she has hyperglycemia during the day, we will have patient take NPH 4 units in the morning, and NPH 12 units at night.  Once she delivers, she can check twice daily (before breakfast, before evening meal) 3 days/week and can stop checking her blood sugar if her blood sugars are consistently less than 150.    #2 current pregnancy  Anticipated delivery next week.  Discussed with patient that with future pregnancies, she is likely to have recurrent of her gestational diabetes sooner and earlier.  She is also at risk for type 2 diabetes in the future.    We will call her later this week to see how her blood sugars are doing, return visit in roughly 1 month.    Again, thank you for allowing me to participate in the care of your patient.      Sincerely,    Chen Cosme MD    "

## 2025-07-29 NOTE — LETTER
2025         RE: Lavelle Montana  [address] [place] [address]        Dear Colleague,    Thank you for referring your patient, Lavelle Montana, to the [facility]. Please see a copy of my visit note below.      Gestational Diabetes Self-Management Education & Support    Type of Service: In Person Visit      Assessment    ** pls see BG log below    Pt is here for a GDM follow up visit. Shares that she has had some complications and that she anticipates delivery next week () by .   Has good support from family and friends.     Saw endo today -- 4 units NPH was added in the AM to address hyperglycemia during the day. She is already taking 12 units NPH at HS (started taking on )   Denies lows or s/s. We reviewed hypoglycemia management. Sees OB tomorrow/.   Will also see endo ~3 weeks after delivery     Consumes  2-3 traditional meals with rice with meat/protein   Endorses large portions of fruit (especially watermelon) and also occasional coffee drinks/latte at [employer]  Reviewed GDM diet guidelines, CHO foods. Writer used [employer] food today to review healthy, balanced meals and portions. We also reviewed healthy/lean protein foods     We reviewed post partum instructions and continuing focusing on a healthy lifestyle   She plans to do the 30-day chicken diet after she delivers.         Intervention  Educational topics covered today:  What to expect after delivery, future testing for Type 2 diabetes (2 hour OGTT at 6 week post-partum check-up and annual fasting blood glucose level), risk of GDM and planning ahead for future pregnancies, recommended lifestyle interventions for reducing the risk of Type 2 Diabetes, when to call a Diabetes Educator or OB provider    Educational Materials provided today:  N/a    Patient verbalized understanding of diabetes self-management education concepts discussed, opportunities for ongoing education and support, and recommendations  provided today    Plan    Please continue to take insulin as prescribed   Check glucose four times daily, before breakfast and 1 hour after each meal.  Check ketones once a week when readings are consistently negative.  Continue with recommended physical activity.  Continue to follow recommended meal plan: 30-45g carbohydratess at breakfast, 45-60g carbohydratess at lunch, 45-60g carbohydrates at supper, 15-30g carbohydrates at snacks.  Follow consistent carbohydrate meal plan, eat carbohydrates and protein/fat at all meals/snacks.    ++ pt to send in BG log for review in 2-3 days for possible med adjustment     Send in Glucose Log (blood sugars) via [employer] in 3 days/Friday.  If 3 or more blood sugars are above the goal at a given time, or if Ketones are small, moderate or large, call or [employer] message the diabetes educator.    Subjective/Objective  Lavelle is an 40 year old, presenting for the following diabetes education related to:          7/21/2025   General   Accompanied by Self    Self   Diabetes management related comments/concerns No    Elevated fasting BGs. Baby is measuring on the small side. She wonders if having high blood sugars will help baby gain more weight.   Gestational weeks 33w1d    33w1d   [facility] delivery [facility]   Next OB Visit Date 7/25/2025 7/25/2025   Number of previous pregnancies 2    2   Had any babies over 9 lbs Yes    Yes   Previously had Gestational Diabetes No    No   Have you ever had thyroid problems or taken thyroid medication? No   Heart disease, mitral valve prolapse or rheumatic fever? No   Hypertension  No   High Cholesterol No   High Triglycerides No   Do you use tobacco products? No    No   Do you drink beer, wine or hard liquor? Yes    No       Multiple values from one day are sorted in reverse-chronological order       Cultural Influences/Ethnic Background:  Not  or       LMP 12/01/2024     Weight gain 15  lbs at 34 weeks gestation.    Estimated Date of Delivery: Sep 7, 2025          2025   Lifestyle and Health Behaviors   Pre-pregnancy weight (lbs) 175    175   Exercise: Currently not exercising   Barrier to exercise Time   Cultural/Latter day diet restrictions? No   Meal planning/habits Avoiding sweets;Low salt;Smaller portions;Frequent snacking    Carb counting   How many times a week on average do you eat food made away from home (restaurant/take-out)? 2   Meals include Breakfast;Lunch;Dinner;Morning Snack;Afternoon Snack;Evening Snack    Breakfast;Lunch;Dinner;Morning Snack;Afternoon Snack;Evening Snack   Breakfast rice, meat, veg    rice, meat, vegetables   Lunch rice, meat, veg    rice, meat, vegetables   Dinner rice, meat, veg    rice, meat, vegetables   Snacks fruit and bedtime snack is milk or yogurt    fruit usually and then bedtime is milk or yogurt   Beverages Water   How many servings of fruits/vegetables per day 4   Biggest challenges to healthy eating None    None   Pre- vitamin? Yes    Yes   Supplements? No   Experiencing nausea? No   Experiencing heartburn? No       Multiple values from one day are sorted in reverse-chronological order         2025   Healthy Coping   Emotional response to diabetes Ready to learn;Concern for health and well-being    Ready to learn;Concern for health and well-being   Informal Support system: Family;Friends;Parent;Partner   Stage of change ACTION (Actively working towards change)    ACTION (Actively working towards change)       Multiple values from one day are sorted in reverse-chronological order     Diabetes Medication(s)       Insulin       insulin NPH (NOVOLIN N FLEXPEN RELION) 100 UNIT/ML injection Inject 12 Units subcutaneously at bedtime.              2025   Current Management   Taking medications for gestational diabetes? No    No       Multiple values from one day are sorted in reverse-chronological order         2025   Education    Blood Glucose Meter Unknown    Unknown   How confident are you filling out medical forms by yourself: Somewhat    Not Assessed   Other concerns None    None       Multiple values from one day are sorted in reverse-chronological order       MACHELLE Kolb RDN, [facility]  Diabetes Care and Education  [phone] (Triage)     Time Spent: 35 minutes  Encounter Type: Individual     Diabetes medication dose changes were made via the [facility] Standing Orders under the patient's referring provider.

## 2025-07-29 NOTE — NURSING NOTE
Current patient location: [place]    Is the patient currently in the state of [place]? YES    Visit mode: VIDEO    If the visit is dropped, the patient can be reconnected by:VIDEO VISIT: Text to cell phone:   Telephone Information:   Mobile [phone]       Will anyone else be joining the visit? NO  (If patient encounters technical issues they should call [phone]    Are changes needed to the allergy or medication list? No    Are refills needed on medications prescribed by this physician? NO    Rooming Documentation:  Questionnaire(s) completed    Reason for visit: Consult    Palmira BERMAN

## 2025-07-29 NOTE — NURSING NOTE
Lavelle Montana is a  at 34w2d who presents to [facility] for [facility]/UAR/NST for SFGR, GDMA2,CHD, polyhydramnios. Pt reports positive fetal movement. Pt denies bldg/lof/change in discharge, contractions, headache, vision changes, chest pain/SOB or edema. Pt expressing a variety of anxieties related to delivery and needs of her . Pt expresses relief knowing that her section is scheduled . SBAR given to Dr. Rangel, see note in Epic.

## 2025-07-29 NOTE — TELEPHONE ENCOUNTER
PAPERWORK REQUEST    Form received on: 07/25/2025  How was form received: In Person  Preferred Provider: Phoebe Dallas MD  Type of form: FMLA  Date needed by: 08/01/2025  What to do with form once completed: Please fax to [phone]  Where was form placed?: BE basket  Has an MU been signed? Not Applicable    Additional Notes:

## 2025-07-29 NOTE — PATIENT INSTRUCTIONS
NPH 4 units in the AM, 12 units at night    After delivery, Check twice daily (before breakfast and before supper), 3 days per week, if blood sugars consistently< 150, ok to stop

## 2025-07-29 NOTE — PROGRESS NOTES
Gestational Diabetes Self-Management Education & Support    Type of Service: In Person Visit      Assessment    ** pls see BG log below    Pt is here for a GDM follow up visit. Shares that she has had some complications and that she anticipates delivery next week () by .   Has good support from family and friends.     Saw endo today -- 4 units NPH was added in the AM to address hyperglycemia during the day. She is already taking 12 units NPH at  (started taking on )   Denies lows or s/s. We reviewed hypoglycemia management. Sees OB tomorrow/.   Will also see endo ~3 weeks after delivery     Consumes  2-3 traditional meals with rice with meat/protein   Endorses large portions of fruit (especially watermelon) and also occasional coffee drinks/latte at [employer]  Reviewed GDM diet guidelines, CHO foods. Writer used fake food today to review healthy, balanced meals and portions. We also reviewed healthy/lean protein foods     We reviewed post partum instructions and continuing focusing on a healthy lifestyle   She plans to do the 30-day chicken diet after she delivers.         Intervention  Educational topics covered today:  What to expect after delivery, future testing for Type 2 diabetes (2 hour OGTT at 6 week post-partum check-up and annual fasting blood glucose level), risk of GDM and planning ahead for future pregnancies, recommended lifestyle interventions for reducing the risk of Type 2 Diabetes, when to call a Diabetes Educator or OB provider    Educational Materials provided today:  N/a    Patient verbalized understanding of diabetes self-management education concepts discussed, opportunities for ongoing education and support, and recommendations provided today    Plan    Please continue to take insulin as prescribed   Check glucose four times daily, before breakfast and 1 hour after each meal.  Check ketones once a week when readings are consistently negative.  Continue with  recommended physical activity.  Continue to follow recommended meal plan: 30-45g carbohydratess at breakfast, 45-60g carbohydratess at lunch, 45-60g carbohydrates at supper, 15-30g carbohydrates at snacks.  Follow consistent carbohydrate meal plan, eat carbohydrates and protein/fat at all meals/snacks.    ++ pt to send in BG log for review in 2-3 days for possible med adjustment     Send in Glucose Log (blood sugars) via [employer] in 3 days/Friday.  If 3 or more blood sugars are above the goal at a given time, or if Ketones are small, moderate or large, call or MyChart message the diabetes educator.    Subjective/Objective  Lavelle is an 40 year old, presenting for the following diabetes education related to:          7/21/2025   General   Accompanied by Self    Self   Diabetes management related comments/concerns No    Elevated fasting BGs. Baby is measuring on the small side. She wonders if having high blood sugars will help baby gain more weight.   Gestational weeks 33w1d    33w1d   [facility] planned [facility] delivery [facility]   Next OB Visit Date 7/25/2025 7/25/2025   Number of previous pregnancies 2    2   Had any babies over 9 lbs Yes    Yes   Previously had Gestational Diabetes No    No   Have you ever had thyroid problems or taken thyroid medication? No   Heart disease, mitral valve prolapse or rheumatic fever? No   Hypertension  No   High Cholesterol No   High Triglycerides No   Do you use tobacco products? No    No   Do you drink beer, wine or hard liquor? Yes    No       Multiple values from one day are sorted in reverse-chronological order       Cultural Influences/Ethnic Background:  Not  or       LMP 12/01/2024     Weight gain 15 lbs at 34 weeks gestation.    Estimated Date of Delivery: Sep 7, 2025          7/21/2025   Lifestyle and Health Behaviors   Pre-pregnancy weight (lbs) 175    175   Exercise: Currently not exercising   Barrier to exercise Time    Cultural/Faith diet restrictions? No   Meal planning/habits Avoiding sweets;Low salt;Smaller portions;Frequent snacking    Carb counting   How many times a week on average do you eat food made away from home (restaurant/take-out)? 2   Meals include Breakfast;Lunch;Dinner;Morning Snack;Afternoon Snack;Evening Snack    Breakfast;Lunch;Dinner;Morning Snack;Afternoon Snack;Evening Snack   Breakfast rice, meat, veg    rice, meat, vegetables   Lunch rice, meat, veg    rice, meat, vegetables   Dinner rice, meat, veg    rice, meat, vegetables   Snacks fruit and bedtime snack is milk or yogurt    fruit usually and then bedtime is milk or yogurt   Beverages Water   How many servings of fruits/vegetables per day 4   Biggest challenges to healthy eating None    None   Pre-fabien vitamin? Yes    Yes   Supplements? No   Experiencing nausea? No   Experiencing heartburn? No       Multiple values from one day are sorted in reverse-chronological order         2025   Healthy Coping   Emotional response to diabetes Ready to learn;Concern for health and well-being    Ready to learn;Concern for health and well-being   Informal Support system: Family;Friends;Parent;Partner   Stage of change ACTION (Actively working towards change)    ACTION (Actively working towards change)       Multiple values from one day are sorted in reverse-chronological order     Diabetes Medication(s)       Insulin       insulin NPH (NOVOLIN N FLEXPEN RELION) 100 UNIT/ML injection Inject 12 Units subcutaneously at bedtime.              2025   Current Management   Taking medications for gestational diabetes? No    No       Multiple values from one day are sorted in reverse-chronological order         2025   Education   Blood Glucose Meter Unknown    Unknown   How confident are you filling out medical forms by yourself: Somewhat    Not Assessed   Other concerns None    None       Multiple values from one day are sorted in reverse-chronological  order       MACHELLE Kolb RDN, [facility]  Diabetes Care and Education  [phone] (Triage)     Time Spent: 35 minutes  Encounter Type: Individual     Diabetes medication dose changes were made via the [facility] Standing Orders under the patient's referring provider.

## 2025-07-29 NOTE — PROGRESS NOTES
Video-Visit Details    Type of service:  Video Visit    Virtual visit conducted by Jax.      Originating Location (pt. Location): HOME    Distant Location (provider location):  Off site    Mode of Communication:  Video Conference via [employer]    Physician has received verbal consent for a Video Visit from the patient? YES      Chen Cosme MD     25    Lavelle is a 40 year old female presents today for evaluation of gestational diabetes in the setting of pregnancy    HPI  #1 gestational diabetes  2025 hemoglobin A1c of 5.5.  The patient is currently pregnant.  2025 fasting glucose 100, 1 hour glucose 183, 2-hour glucose at 153.  The patient was started on NPH one-shot at night and is currently taking 12 units prior to bedtime.  However, over the last few days, she has reported hypoglycemia during the day with blood sugars at lunch roughly 150-190.    Reports history of 2 previous pregnancies (delivery in , ) which was not complicated by gestational pregnancy.  Has gained about 15 pounds over the course of the current pregnancy.  Patient denies any family history of diabetes.    #2 current pregnancy  The patient is currently 34 weeks.  Anticipates delivery next week by .  Reports she has gained 15 pounds.  Anticipates formula feeding.  Is interested in possibly 1 more pregnancy in the future.    Past Medical History  Past Medical History:   Diagnosis Date    Anxiety     Cervical stenosis (uterine cervix)     Endometriosis     Hypertension     Infertility, female        Allergies  No Known Allergies  Medications  Current Outpatient Medications   Medication Sig Dispense Refill    acetone urine (KETOSTIX) test strip Test daily. When negative for 1 week, can reduce testing to once weekly. 50 strip 1    insulin NPH (NOVOLIN N FLEXPEN RELION) 100 UNIT/ML injection Inject 12 Units subcutaneously at bedtime. 15 mL 1    insulin pen needle (32G X 4 MM) 32G X 4 MM miscellaneous Use 1  pen needles daily or as directed. 100 each 2    Prenatal Vit-Fe Fumarate-FA (PRENATAL MULTIVITAMIN  PLUS IRON) 27-1 MG TABS Take 1 tablet by mouth daily.       Family History  family history includes Hyperlipidemia in her father.  Social History  Social History     Socioeconomic History    Marital status: Single     Spouse name: Not on file    Number of children: Not on file    Years of education: Not on file    Highest education level: Not on file   Occupational History    Not on file   Tobacco Use    Smoking status: Never     Passive exposure: Current    Smokeless tobacco: Never    Tobacco comments:      smokes    Vaping Use    Vaping status: Never Used   Substance and Sexual Activity    Alcohol use: Not Currently     Comment: on weekends    Drug use: Never    Sexual activity: Yes     Partners: Male     Birth control/protection: None   Other Topics Concern    Parent/sibling w/ CABG, MI or angioplasty before 65F 55M? Not Asked   Social History Narrative    Not on file     Social Drivers of Health     Financial Resource Strain: Low Risk  (7/22/2025)    Financial Resource Strain     Within the past 12 months, have you or your family members you live with been unable to get utilities (heat, electricity) when it was really needed?: No   Food Insecurity: Low Risk  (7/22/2025)    Food Insecurity     Within the past 12 months, did you worry that your food would run out before you got money to buy more?: No     Within the past 12 months, did the food you bought just not last and you didn t have money to get more?: No   Transportation Needs: Low Risk  (7/22/2025)    Transportation Needs     Within the past 12 months, has lack of transportation kept you from medical appointments, getting your medicines, non-medical meetings or appointments, work, or from getting things that you need?: No   Physical Activity: Unknown (5/16/2024)    Exercise Vital Sign     Days of Exercise per Week: 1 day     Minutes of Exercise per  "Session: Not on file   Stress: Stress Concern Present (5/16/2024)    [employer] [place] [employer] - Occupational Stress Questionnaire     Feeling of Stress : To some extent   Social Connections: Unknown (5/16/2024)    Social Connection and Isolation Panel [NHANES]     Frequency of Communication with Friends and Family: Not on file     Frequency of Social Gatherings with Friends and Family: Once a week     Attends Scientologist Services: Not on file     Active Member of Clubs or Organizations: Not on file     Attends Club or Organization Meetings: Not on file     Marital Status: Not on file   Interpersonal Safety: Low Risk  (7/18/2025)    Interpersonal Safety     Do you feel physically and emotionally safe where you currently live?: Yes     Within the past 12 months, have you been hit, slapped, kicked or otherwise physically hurt by someone?: No     Within the past 12 months, have you been humiliated or emotionally abused in other ways by your partner or ex-partner?: No   Housing Stability: Low Risk  (7/22/2025)    Housing Stability     Do you have housing? : Yes     Are you worried about losing your housing?: No       ROS  Per HPI      Physical Exam  LMP 12/01/2024   There is no height or weight on file to calculate BMI.    GENERAL: Healthy, alert and no distress\",\"EYES: Eyes grossly normal to inspection.  No discharge or erythema, or obvious scleral/conjunctival abnormalities.\",\"RESP: No audible wheeze, cough, or visible cyanosis.  No visible retractions or increased work of breathing.  \",\"SKIN: Visible skin clear. No significant rash, abnormal pigmentation or lesions.\",\"NEURO: Cranial nerves grossly intact.  Mentation and speech appropriate for age.\",\"PSYCH: Mentation appears normal, affect normal/bright, judgement and insight intact, normal speech and appearance well-groomed.      RESULTS          ASSESSMENT:     #1 gestational diabetes with no prior history of diabetes  Given that she has hyperglycemia " during the day, we will have patient take NPH 4 units in the morning, and NPH 12 units at night.  Once she delivers, she can check twice daily (before breakfast, before evening meal) 3 days/week and can stop checking her blood sugar if her blood sugars are consistently less than 150.    #2 current pregnancy  Anticipated delivery next week.  Discussed with patient that with future pregnancies, she is likely to have recurrent of her gestational diabetes sooner and earlier.  She is also at risk for type 2 diabetes in the future.    We will call her later this week to see how her blood sugars are doing, return visit in roughly 1 month.

## 2025-07-30 ENCOUNTER — PRENATAL OFFICE VISIT (OUTPATIENT)
Dept: OBGYN | Facility: CLINIC | Age: 40
End: 2025-07-30
Payer: COMMERCIAL

## 2025-07-30 VITALS
DIASTOLIC BLOOD PRESSURE: 71 MMHG | WEIGHT: 192 LBS | HEART RATE: 105 BPM | SYSTOLIC BLOOD PRESSURE: 117 MMHG | BODY MASS INDEX: 37.5 KG/M2

## 2025-07-30 DIAGNOSIS — O36.5990 PREGNANCY AFFECTED BY FETAL GROWTH RESTRICTION: Primary | ICD-10-CM

## 2025-07-30 PROCEDURE — 3078F DIAST BP <80 MM HG: CPT | Performed by: OBSTETRICS & GYNECOLOGY

## 2025-07-30 PROCEDURE — 3074F SYST BP LT 130 MM HG: CPT | Performed by: OBSTETRICS & GYNECOLOGY

## 2025-07-30 PROCEDURE — 99207 PR PRENATAL VISIT: CPT | Performed by: OBSTETRICS & GYNECOLOGY

## 2025-07-30 PROCEDURE — 0502F SUBSEQUENT PRENATAL CARE: CPT | Performed by: OBSTETRICS & GYNECOLOGY

## 2025-07-30 NOTE — PATIENT INSTRUCTIONS
Weeks 34 to 36 of Your Pregnancy: Care Instructions  Your belly has grown quite large. It's almost time to give birth! Your baby's lungs are almost ready to breathe air. The skull bones are firm enough to protect your baby's head. But they're soft enough to move down through the birth canal.    You might be wondering what to expect during labor. Because each birth is different, there's no way to know exactly what childbirth will be like for you. Talk to your doctor or midwife about any concerns you have.   You'll probably have a test for group B streptococcus (GBS). GBS is bacteria that can live in the vagina and rectum. GBS can make your baby sick after birth. If you test positive, you'll get antibiotics during labor.     Choose what type of pain relief you would like during labor.  You can choose from a few types, including medicine and non-medicine options. You may want to use several types of pain relief.     Know how medicines can help with pain during labor.  Some medicines lower anxiety and help with some of the pain. Others make your lower body numb so that you will feel less pain.     Tell your doctor about your pain medicine choice.  Do this before you start labor or very early in your labor. You may be able to change your mind during labor.     Learn about the stages of labor.    The first stage includes the early (latent) and active phases of labor. Contractions start in early labor. During active labor, contractions get stronger, last longer, and happen more often. And the cervix opens more rapidly.  The second stage starts when you're ready to push. During this stage, your baby is born.  During the third stage, you'll usually have a few more contractions to push out the placenta.   Follow-up care is a key part of your treatment and safety. Be sure to make and go to all appointments, and call your doctor if you are having problems. It's also a good idea to know your test results and keep a list of the  "medicines you take.  Where can you learn more?  Go to https://www.[employer].net/patiented  Enter B912 in the search box to learn more about \"Weeks 34 to 36 of Your Pregnancy: Care Instructions.\"  Current as of: 2024  Content Version: . [employer].   Care instructions adapted under license by your healthcare professional. If you have questions about a medical condition or this instruction, always ask your healthcare professional. [employer] disclaims any warranty or liability for your use of this information.    Group B Strep During Pregnancy: Care Instructions  Overview     Group B strep infection is caused by a type of bacteria. It's a different kind of bacteria than the kind that causes strep throat.  You may have this kind of bacteria in your body. Sometimes it may cause an infection, but most of the time it doesn't make you sick or cause symptoms. But if you pass the bacteria to your baby during the birth, it can cause serious health problems for your baby.  If you have this bacteria in your body, you will get antibiotics when you are in labor. Antibiotics help prevent problems for a  baby.  After birth, doctors will watch and may test your baby. If your baby tests positive for Group B strep, your baby will get antibiotics.  If you plan to breastfeed your baby, don't worry. It will be safe to breastfeed.  Follow-up care is a key part of your treatment and safety. Be sure to make and go to all appointments, and call your doctor if you are having problems. It's also a good idea to know your test results and keep a list of the medicines you take.  How can you care for yourself at home?  If your doctor has prescribed antibiotics, take them as directed. Do not stop taking them just because you feel better. You need to take the full course of antibiotics.  Tell your doctor if you are allergic to any antibiotic.  If you go into labor, or your water breaks, go to " "the hospital. Your doctor will give you antibiotics to help protect your baby from infection.  Tell the doctors and nurses if you have an allergy to penicillin.  Tell the doctors and nurses at the hospital that you tested positive for group B strep.  When should you call for help?   Call your doctor now or seek immediate medical care if:    You have symptoms of a urinary tract infection. These may include:  Pain or burning when you urinate.  A frequent need to urinate without being able to pass much urine.  Pain in the flank, which is just below the rib cage and above the waist on either side of the back.  Blood in your urine.  A fever.     You think you are in labor or your water has broken.     You have pain in your belly or pelvis.   Watch closely for changes in your health, and be sure to contact your doctor if you have any problems.  Where can you learn more?  Go to https://www.[employer].com.Home Inns/patiented  Enter M001 in the search box to learn more about \"Group B Strep During Pregnancy: Care Instructions.\"  Current as of: April 30, 2024  Content Version: 14.5    0645-3041 [employer].   Care instructions adapted under license by your healthcare professional. If you have questions about a medical condition or this instruction, always ask your healthcare professional. [employer] disclaims any warranty or liability for your use of this information.    Circumcision in Infants: What to Expect at Home  Your Child's Recovery  After circumcision, your baby's penis may look red and swollen. It may have petroleum jelly and gauze on it. The gauze will likely come off when your baby urinates. Follow your doctor's directions about whether to put clean gauze back on your baby's penis or to leave the gauze off. If you need to remove gauze from the penis, use warm water to soak the gauze and gently loosen it.  The doctor may have used a Plastibell device to do the circumcision. If so, your baby will have a " plastic ring around the head of the penis. The ring should fall off by itself in 10 to 12 days.  A thin, yellow film may form over the area the day after the procedure. This is part of the normal healing process. It should go away in a few days.  Your baby may seem fussy while the area heals. It may hurt for your baby to urinate. This pain often gets better in 3 or 4 days. But it may last for up to 2 weeks.  Even though your baby's penis will likely start to feel better after 3 or 4 days, it may look worse. The penis often starts to look like it's getting better after about 7 to 10 days.  This care sheet gives you a general idea about how long it will take for your child to recover. But each child recovers at a different pace. Follow the steps below to help your child get better as quickly as possible.  How can you care for your child at home?  Activity    Let your baby rest as much as possible. Sleeping will help with recovery.     You can give your baby a sponge bath the day after surgery. Ask your doctor when it is okay to give your baby a bath.   Medicines    Your doctor will tell you if and when your child can restart any medicines. The doctor will also give you instructions about your child taking any new medicines.     Your doctor may recommend giving your baby acetaminophen (Tylenol) to help with pain after the procedure. Be safe with medicines. Give your child medicines exactly as prescribed. Call your doctor if you think your child is having a problem with a medicine.     Do not give your child two or more pain medicines at the same time unless the doctor told you to. Many pain medicines have acetaminophen, which is Tylenol. Too much acetaminophen (Tylenol) can be harmful.   Circumcision care    Always wash your hands before and after touching the circumcision area.     Gently wash your baby's penis with plain, warm water after each diaper change, and pat it dry. Do not use soap. Don't use hydrogen  "peroxide or alcohol. They can slow healing.     Do not try to remove the film that forms on the penis. The film will go away on its own.     Put plenty of petroleum jelly (such as Vaseline) on the circumcision area during each diaper change. This will prevent your baby's penis from sticking to the diaper while it heals.     Fasten your baby's diapers loosely so that there is less pressure on the penis while it heals.   Follow-up care is a key part of your child's treatment and safety. Be sure to make and go to all appointments, and call your doctor if your child is having problems. It's also a good idea to know your child's test results and keep a list of the medicines your child takes.  When should you call for help?   Call your doctor now or seek immediate medical care if:    Your baby has a fever over 100.4 F.     Your baby is extremely fussy or irritable, has a high-pitched cry, or refuses to eat.     Your baby does not have a wet diaper within 12 hours after the circumcision.     You find a spot of bleeding larger than a 2-inch [place] from the incision.     Your baby has signs of infection. Signs may include severe swelling; redness; a red streak on the shaft of the penis; or a thick, yellow discharge.   Watch closely for changes in your child's health, and be sure to contact your doctor if:    A Plastibell device was used for the circumcision and the ring has not fallen off after 10 to 12 days.   Where can you learn more?  Go to https://www.[employer] [place].net/patiented  Enter S255 in the search box to learn more about \"Circumcision in Infants: What to Expect at Home.\"  Current as of: October 24, 2024  Content Version: 14.5    9289-6933 [employer].   Care instructions adapted under license by your healthcare professional. If you have questions about a medical condition or this instruction, always ask your healthcare professional. [employer] disclaims any warranty or liability for your use of " this information.

## 2025-07-30 NOTE — TELEPHONE ENCOUNTER
Form signed by BE, faxed back to number listed on form and scanned to HIM.     Jessica  She/her/hers  [facility]

## 2025-07-30 NOTE — PROGRESS NOTES
Wasn't feeling a lot of movement so happy to feel now.  yesterday, discussed normal and today was 115-150. No questions about c/s but anxious about how baby will do afterwards. Knows baby has hair on body as well. BE

## 2025-07-30 NOTE — TELEPHONE ENCOUNTER
Type of surgery: ob  Location of surgery: [facility] OR  Date and time of surgery: 08/05/25 10:30AM  Surgeon: Burt  Pre-Op Appt Date: surgeon  Post-Op Appt Date: 09/22/25   Packet sent out: Yes (given in office today)  Pre-cert/Authorization completed:  Not Applicable  Date: 07/30/25     ALCIDES Lopez  She/her/hers  [facility]

## 2025-07-31 ENCOUNTER — TELEPHONE (OUTPATIENT)
Dept: EDUCATION SERVICES | Facility: CLINIC | Age: 40
End: 2025-07-31
Payer: COMMERCIAL

## 2025-07-31 NOTE — TELEPHONE ENCOUNTER
Patient Contacted for the patient to call back and schedule the following:    Appointment type:CDE Referral  Provider: NONE Any CDE, Any Location  Return date: First Available  Specialty phone number: [phone]  Additional appointment(s) needed: N/A  Additonal Notes:  Spoke to patient and she declined setting up this appointment.  Said she is delivering next Tuesday and will have a follow up with Dr Cosme after that.    Rhoda Durbin, RN to [id number] (Selected Message)  DM      7/31/25  9:09 AM  GDM: Referral to CDE in place. Please help schedule   Rhoda Durbin, RN to Lavelle Montana  DM      7/31/25  9:09 AM  Dr Ba Siegel has asked us to reach out with the following message:      Message  Received: Today  Chen Cosme MD    How are blood sugars doing with the additional AM NPH?     Please let us know.      Thank you.      The Endocrine Team     Last read by Lavelle Montana at 9:20AM on 7/31/2025.    Tal Rios on 7/31/2025 at 10:10 AM

## 2025-08-01 ENCOUNTER — ANCILLARY PROCEDURE (OUTPATIENT)
Dept: ULTRASOUND IMAGING | Facility: HOSPITAL | Age: 40
End: 2025-08-01
Attending: STUDENT IN AN ORGANIZED HEALTH CARE EDUCATION/TRAINING PROGRAM
Payer: COMMERCIAL

## 2025-08-01 DIAGNOSIS — O36.5990 PREGNANCY AFFECTED BY FETAL GROWTH RESTRICTION: ICD-10-CM

## 2025-08-01 PROCEDURE — 76819 FETAL BIOPHYS PROFIL W/O NST: CPT | Mod: 26 | Performed by: STUDENT IN AN ORGANIZED HEALTH CARE EDUCATION/TRAINING PROGRAM

## 2025-08-01 PROCEDURE — 76819 FETAL BIOPHYS PROFIL W/O NST: CPT

## 2025-08-03 LAB
ABO + RH BLD: NORMAL
BLD GP AB SCN SERPL QL: NEGATIVE
SPECIMEN EXP DATE BLD: NORMAL

## 2025-08-04 ENCOUNTER — TELEPHONE (OUTPATIENT)
Dept: ENDOCRINOLOGY | Facility: CLINIC | Age: 40
End: 2025-08-04

## 2025-08-04 ENCOUNTER — ANESTHESIA EVENT (OUTPATIENT)
Dept: OBGYN | Facility: CLINIC | Age: 40
End: 2025-08-04
Payer: COMMERCIAL

## 2025-08-04 ENCOUNTER — LAB (OUTPATIENT)
Dept: LAB | Facility: CLINIC | Age: 40
End: 2025-08-04
Payer: COMMERCIAL

## 2025-08-04 DIAGNOSIS — O24.419 GESTATIONAL DIABETES: ICD-10-CM

## 2025-08-04 DIAGNOSIS — O24.414 INSULIN CONTROLLED GESTATIONAL DIABETES MELLITUS (GDM) IN THIRD TRIMESTER: ICD-10-CM

## 2025-08-04 DIAGNOSIS — Z01.818 PRE-OP EXAM: ICD-10-CM

## 2025-08-04 LAB
ERYTHROCYTE [DISTWIDTH] IN BLOOD BY AUTOMATED COUNT: 14.1 % (ref 10–15)
HCT VFR BLD AUTO: 32.5 % (ref 35–47)
HGB BLD-MCNC: 11.1 G/DL (ref 11.7–15.7)
MCH RBC QN AUTO: 30.4 PG (ref 26.5–33)
MCHC RBC AUTO-ENTMCNC: 34.2 G/DL (ref 31.5–36.5)
MCV RBC AUTO: 89 FL (ref 78–100)
PLATELET # BLD AUTO: 223 10E3/UL (ref 150–450)
RBC # BLD AUTO: 3.65 10E6/UL (ref 3.8–5.2)
WBC # BLD AUTO: 8.4 10E3/UL (ref 4–11)

## 2025-08-04 PROCEDURE — 86850 RBC ANTIBODY SCREEN: CPT

## 2025-08-04 PROCEDURE — 85027 COMPLETE CBC AUTOMATED: CPT

## 2025-08-04 PROCEDURE — 86900 BLOOD TYPING SEROLOGIC ABO: CPT

## 2025-08-04 PROCEDURE — 86901 BLOOD TYPING SEROLOGIC RH(D): CPT

## 2025-08-04 PROCEDURE — 86780 TREPONEMA PALLIDUM: CPT

## 2025-08-04 PROCEDURE — 36415 COLL VENOUS BLD VENIPUNCTURE: CPT

## 2025-08-04 RX ORDER — HUMAN INSULIN 100 [IU]/ML
INJECTION, SUSPENSION SUBCUTANEOUS
Status: ON HOLD
Start: 2025-08-04 | End: 2025-08-06

## 2025-08-05 ENCOUNTER — HOSPITAL ENCOUNTER (INPATIENT)
Facility: CLINIC | Age: 40
End: 2025-08-05
Attending: OBSTETRICS & GYNECOLOGY | Admitting: OBSTETRICS & GYNECOLOGY
Payer: COMMERCIAL

## 2025-08-05 ENCOUNTER — ANESTHESIA (OUTPATIENT)
Dept: OBGYN | Facility: CLINIC | Age: 40
End: 2025-08-05
Payer: COMMERCIAL

## 2025-08-05 DIAGNOSIS — Z82.79 FAMILY HISTORY OF CONGENITAL OR GENETIC CONDITION: ICD-10-CM

## 2025-08-05 LAB
ALT SERPL W P-5'-P-CCNC: <5 U/L (ref 0–50)
AST SERPL W P-5'-P-CCNC: 9 U/L (ref 0–45)
CREAT SERPL-MCNC: 0.46 MG/DL (ref 0.51–0.95)
EGFRCR SERPLBLD CKD-EPI 2021: >90 ML/MIN/1.73M2
GLUCOSE BLDC GLUCOMTR-MCNC: 104 MG/DL (ref 70–99)
MCV RBC AUTO: 91 FL (ref 78–100)
PLATELET # BLD AUTO: 193 10E3/UL (ref 150–450)
T PALLIDUM AB SER QL: NONREACTIVE

## 2025-08-05 PROCEDURE — 82565 ASSAY OF CREATININE: CPT

## 2025-08-05 PROCEDURE — 250N000011 HC RX IP 250 OP 636: Performed by: OBSTETRICS & GYNECOLOGY

## 2025-08-05 PROCEDURE — 250N000013 HC RX MED GY IP 250 OP 250 PS 637: Performed by: OBSTETRICS & GYNECOLOGY

## 2025-08-05 PROCEDURE — 999N000141 HC STATISTIC PRE-PROCEDURE NURSING ASSESSMENT: Performed by: OBSTETRICS & GYNECOLOGY

## 2025-08-05 PROCEDURE — 84460 ALANINE AMINO (ALT) (SGPT): CPT

## 2025-08-05 PROCEDURE — 250N000011 HC RX IP 250 OP 636

## 2025-08-05 PROCEDURE — 88307 TISSUE EXAM BY PATHOLOGIST: CPT | Mod: 26 | Performed by: STUDENT IN AN ORGANIZED HEALTH CARE EDUCATION/TRAINING PROGRAM

## 2025-08-05 PROCEDURE — 85049 AUTOMATED PLATELET COUNT: CPT

## 2025-08-05 PROCEDURE — 250N000013 HC RX MED GY IP 250 OP 250 PS 637

## 2025-08-05 PROCEDURE — 710N000010 HC RECOVERY PHASE 1, LEVEL 2, PER MIN: Performed by: OBSTETRICS & GYNECOLOGY

## 2025-08-05 PROCEDURE — 258N000003 HC RX IP 258 OP 636: Performed by: OBSTETRICS & GYNECOLOGY

## 2025-08-05 PROCEDURE — 999N000127 HC STATISTIC PERIPHERAL IV START W US GUIDANCE

## 2025-08-05 PROCEDURE — 88307 TISSUE EXAM BY PATHOLOGIST: CPT | Mod: TC

## 2025-08-05 PROCEDURE — 59515 CESAREAN DELIVERY: CPT | Mod: GC | Performed by: OBSTETRICS & GYNECOLOGY

## 2025-08-05 PROCEDURE — 250N000009 HC RX 250

## 2025-08-05 PROCEDURE — 272N000001 HC OR GENERAL SUPPLY STERILE: Performed by: OBSTETRICS & GYNECOLOGY

## 2025-08-05 PROCEDURE — 84450 TRANSFERASE (AST) (SGOT): CPT

## 2025-08-05 PROCEDURE — 370N000017 HC ANESTHESIA TECHNICAL FEE, PER MIN: Performed by: OBSTETRICS & GYNECOLOGY

## 2025-08-05 PROCEDURE — 120N000002 HC R&B MED SURG/OB UMMC

## 2025-08-05 PROCEDURE — 360N000076 HC SURGERY LEVEL 3, PER MIN: Performed by: OBSTETRICS & GYNECOLOGY

## 2025-08-05 PROCEDURE — 36415 COLL VENOUS BLD VENIPUNCTURE: CPT

## 2025-08-05 PROCEDURE — 258N000003 HC RX IP 258 OP 636

## 2025-08-05 PROCEDURE — 250N000009 HC RX 250: Performed by: OBSTETRICS & GYNECOLOGY

## 2025-08-05 PROCEDURE — 250N000011 HC RX IP 250 OP 636: Performed by: ANESTHESIOLOGY

## 2025-08-05 PROCEDURE — 271N000001 HC OR GENERAL SUPPLY NON-STERILE: Performed by: OBSTETRICS & GYNECOLOGY

## 2025-08-05 RX ORDER — LIDOCAINE 40 MG/G
CREAM TOPICAL
Status: DISCONTINUED | OUTPATIENT
Start: 2025-08-05 | End: 2025-08-08 | Stop reason: HOSPADM

## 2025-08-05 RX ORDER — LOPERAMIDE HYDROCHLORIDE 2 MG/1
4 CAPSULE ORAL
Status: DISCONTINUED | OUTPATIENT
Start: 2025-08-05 | End: 2025-08-05

## 2025-08-05 RX ORDER — MORPHINE SULFATE 1 MG/ML
150 INJECTION, SOLUTION EPIDURAL; INTRATHECAL; INTRAVENOUS ONCE
Status: DISCONTINUED | OUTPATIENT
Start: 2025-08-05 | End: 2025-08-05

## 2025-08-05 RX ORDER — OXYTOCIN/0.9 % SODIUM CHLORIDE 30/500 ML
340 PLASTIC BAG, INJECTION (ML) INTRAVENOUS CONTINUOUS PRN
Status: DISCONTINUED | OUTPATIENT
Start: 2025-08-05 | End: 2025-08-05

## 2025-08-05 RX ORDER — KETOROLAC TROMETHAMINE 15 MG/ML
15 INJECTION, SOLUTION INTRAMUSCULAR; INTRAVENOUS EVERY 6 HOURS
Status: COMPLETED | OUTPATIENT
Start: 2025-08-05 | End: 2025-08-06

## 2025-08-05 RX ORDER — MISOPROSTOL 200 UG/1
400 TABLET ORAL
Status: DISCONTINUED | OUTPATIENT
Start: 2025-08-05 | End: 2025-08-05

## 2025-08-05 RX ORDER — METOCLOPRAMIDE 10 MG/1
10 TABLET ORAL EVERY 6 HOURS PRN
Status: DISCONTINUED | OUTPATIENT
Start: 2025-08-05 | End: 2025-08-05

## 2025-08-05 RX ORDER — NALOXONE HYDROCHLORIDE 0.4 MG/ML
0.2 INJECTION, SOLUTION INTRAMUSCULAR; INTRAVENOUS; SUBCUTANEOUS
Status: DISCONTINUED | OUTPATIENT
Start: 2025-08-05 | End: 2025-08-08 | Stop reason: HOSPADM

## 2025-08-05 RX ORDER — METHYLERGONOVINE MALEATE 0.2 MG/ML
200 INJECTION INTRAVENOUS
Status: DISCONTINUED | OUTPATIENT
Start: 2025-08-05 | End: 2025-08-08 | Stop reason: HOSPADM

## 2025-08-05 RX ORDER — MORPHINE SULFATE 1 MG/ML
INJECTION, SOLUTION EPIDURAL; INTRATHECAL; INTRAVENOUS
Status: COMPLETED | OUTPATIENT
Start: 2025-08-05 | End: 2025-08-05

## 2025-08-05 RX ORDER — HYDROCORTISONE 25 MG/G
CREAM TOPICAL 3 TIMES DAILY PRN
Status: DISCONTINUED | OUTPATIENT
Start: 2025-08-05 | End: 2025-08-08 | Stop reason: HOSPADM

## 2025-08-05 RX ORDER — FENTANYL CITRATE 50 UG/ML
INJECTION, SOLUTION INTRAMUSCULAR; INTRAVENOUS
Status: COMPLETED | OUTPATIENT
Start: 2025-08-05 | End: 2025-08-05

## 2025-08-05 RX ORDER — CEFAZOLIN SODIUM/WATER 2 G/20 ML
2 SYRINGE (ML) INTRAVENOUS SEE ADMIN INSTRUCTIONS
Status: DISCONTINUED | OUTPATIENT
Start: 2025-08-05 | End: 2025-08-05

## 2025-08-05 RX ORDER — NALOXONE HYDROCHLORIDE 0.4 MG/ML
0.2 INJECTION, SOLUTION INTRAMUSCULAR; INTRAVENOUS; SUBCUTANEOUS
Status: DISCONTINUED | OUTPATIENT
Start: 2025-08-05 | End: 2025-08-05

## 2025-08-05 RX ORDER — OXYTOCIN/0.9 % SODIUM CHLORIDE 30/500 ML
340 PLASTIC BAG, INJECTION (ML) INTRAVENOUS CONTINUOUS PRN
Status: DISCONTINUED | OUTPATIENT
Start: 2025-08-05 | End: 2025-08-08 | Stop reason: HOSPADM

## 2025-08-05 RX ORDER — LOPERAMIDE HYDROCHLORIDE 2 MG/1
2 CAPSULE ORAL
Status: DISCONTINUED | OUTPATIENT
Start: 2025-08-05 | End: 2025-08-08 | Stop reason: HOSPADM

## 2025-08-05 RX ORDER — DIPHENHYDRAMINE HCL 25 MG
25 CAPSULE ORAL EVERY 6 HOURS PRN
Status: DISCONTINUED | OUTPATIENT
Start: 2025-08-05 | End: 2025-08-08 | Stop reason: HOSPADM

## 2025-08-05 RX ORDER — BUPIVACAINE HYDROCHLORIDE 7.5 MG/ML
INJECTION, SOLUTION INTRASPINAL
Status: COMPLETED | OUTPATIENT
Start: 2025-08-05 | End: 2025-08-05

## 2025-08-05 RX ORDER — AMOXICILLIN 250 MG
1 CAPSULE ORAL 2 TIMES DAILY
Status: DISCONTINUED | OUTPATIENT
Start: 2025-08-05 | End: 2025-08-08 | Stop reason: HOSPADM

## 2025-08-05 RX ORDER — ONDANSETRON 2 MG/ML
4 INJECTION INTRAMUSCULAR; INTRAVENOUS EVERY 6 HOURS PRN
Status: DISCONTINUED | OUTPATIENT
Start: 2025-08-05 | End: 2025-08-08 | Stop reason: HOSPADM

## 2025-08-05 RX ORDER — ONDANSETRON 2 MG/ML
4 INJECTION INTRAMUSCULAR; INTRAVENOUS EVERY 6 HOURS PRN
Status: DISCONTINUED | OUTPATIENT
Start: 2025-08-05 | End: 2025-08-05

## 2025-08-05 RX ORDER — PROCHLORPERAZINE MALEATE 10 MG
10 TABLET ORAL EVERY 6 HOURS PRN
Status: DISCONTINUED | OUTPATIENT
Start: 2025-08-05 | End: 2025-08-08 | Stop reason: HOSPADM

## 2025-08-05 RX ORDER — NALOXONE HYDROCHLORIDE 0.4 MG/ML
0.4 INJECTION, SOLUTION INTRAMUSCULAR; INTRAVENOUS; SUBCUTANEOUS
Status: DISCONTINUED | OUTPATIENT
Start: 2025-08-05 | End: 2025-08-05

## 2025-08-05 RX ORDER — CARBOPROST TROMETHAMINE 250 UG/ML
250 INJECTION, SOLUTION INTRAMUSCULAR
Status: DISCONTINUED | OUTPATIENT
Start: 2025-08-05 | End: 2025-08-05

## 2025-08-05 RX ORDER — OXYTOCIN 10 [USP'U]/ML
10 INJECTION, SOLUTION INTRAMUSCULAR; INTRAVENOUS
Status: DISCONTINUED | OUTPATIENT
Start: 2025-08-05 | End: 2025-08-05

## 2025-08-05 RX ORDER — TRANEXAMIC ACID 10 MG/ML
1 INJECTION, SOLUTION INTRAVENOUS EVERY 30 MIN PRN
Status: DISCONTINUED | OUTPATIENT
Start: 2025-08-05 | End: 2025-08-08 | Stop reason: HOSPADM

## 2025-08-05 RX ORDER — ONDANSETRON 4 MG/1
4 TABLET, ORALLY DISINTEGRATING ORAL EVERY 6 HOURS PRN
Status: DISCONTINUED | OUTPATIENT
Start: 2025-08-05 | End: 2025-08-05

## 2025-08-05 RX ORDER — FENTANYL CITRATE-0.9 % NACL/PF 10 MCG/ML
100 PLASTIC BAG, INJECTION (ML) INTRAVENOUS EVERY 5 MIN PRN
Status: DISCONTINUED | OUTPATIENT
Start: 2025-08-05 | End: 2025-08-05

## 2025-08-05 RX ORDER — CEFAZOLIN SODIUM/WATER 2 G/20 ML
2 SYRINGE (ML) INTRAVENOUS
Status: COMPLETED | OUTPATIENT
Start: 2025-08-05 | End: 2025-08-05

## 2025-08-05 RX ORDER — SIMETHICONE 80 MG
80 TABLET,CHEWABLE ORAL EVERY 6 HOURS PRN
Status: DISCONTINUED | OUTPATIENT
Start: 2025-08-05 | End: 2025-08-08 | Stop reason: HOSPADM

## 2025-08-05 RX ORDER — ACETAMINOPHEN 325 MG/1
975 TABLET ORAL ONCE
Status: COMPLETED | OUTPATIENT
Start: 2025-08-05 | End: 2025-08-05

## 2025-08-05 RX ORDER — AMOXICILLIN 250 MG
2 CAPSULE ORAL 2 TIMES DAILY
Status: DISCONTINUED | OUTPATIENT
Start: 2025-08-05 | End: 2025-08-08 | Stop reason: HOSPADM

## 2025-08-05 RX ORDER — SODIUM CHLORIDE, SODIUM LACTATE, POTASSIUM CHLORIDE, CALCIUM CHLORIDE 600; 310; 30; 20 MG/100ML; MG/100ML; MG/100ML; MG/100ML
INJECTION, SOLUTION INTRAVENOUS CONTINUOUS PRN
Status: DISCONTINUED | OUTPATIENT
Start: 2025-08-05 | End: 2025-08-05

## 2025-08-05 RX ORDER — CITRIC ACID/SODIUM CITRATE 334-500MG
30 SOLUTION, ORAL ORAL
Status: COMPLETED | OUTPATIENT
Start: 2025-08-05 | End: 2025-08-05

## 2025-08-05 RX ORDER — LOPERAMIDE HYDROCHLORIDE 2 MG/1
4 CAPSULE ORAL
Status: DISCONTINUED | OUTPATIENT
Start: 2025-08-05 | End: 2025-08-08 | Stop reason: HOSPADM

## 2025-08-05 RX ORDER — CARBOPROST TROMETHAMINE 250 UG/ML
250 INJECTION, SOLUTION INTRAMUSCULAR
Status: DISCONTINUED | OUTPATIENT
Start: 2025-08-05 | End: 2025-08-08 | Stop reason: HOSPADM

## 2025-08-05 RX ORDER — SODIUM PHOSPHATE,MONO-DIBASIC 19G-7G/118
1 ENEMA (ML) RECTAL DAILY PRN
Status: DISCONTINUED | OUTPATIENT
Start: 2025-08-07 | End: 2025-08-08 | Stop reason: HOSPADM

## 2025-08-05 RX ORDER — DEXTROSE, SODIUM CHLORIDE, SODIUM LACTATE, POTASSIUM CHLORIDE, AND CALCIUM CHLORIDE 5; .6; .31; .03; .02 G/100ML; G/100ML; G/100ML; G/100ML; G/100ML
INJECTION, SOLUTION INTRAVENOUS CONTINUOUS
Status: DISCONTINUED | OUTPATIENT
Start: 2025-08-05 | End: 2025-08-08 | Stop reason: HOSPADM

## 2025-08-05 RX ORDER — BISACODYL 10 MG
10 SUPPOSITORY, RECTAL RECTAL DAILY PRN
Status: DISCONTINUED | OUTPATIENT
Start: 2025-08-07 | End: 2025-08-08 | Stop reason: HOSPADM

## 2025-08-05 RX ORDER — KETOROLAC TROMETHAMINE 30 MG/ML
INJECTION, SOLUTION INTRAMUSCULAR; INTRAVENOUS PRN
Status: DISCONTINUED | OUTPATIENT
Start: 2025-08-05 | End: 2025-08-05

## 2025-08-05 RX ORDER — OXYCODONE HYDROCHLORIDE 5 MG/1
5 TABLET ORAL EVERY 4 HOURS PRN
Status: DISCONTINUED | OUTPATIENT
Start: 2025-08-05 | End: 2025-08-08 | Stop reason: HOSPADM

## 2025-08-05 RX ORDER — PROCHLORPERAZINE MALEATE 10 MG
10 TABLET ORAL EVERY 6 HOURS PRN
Status: DISCONTINUED | OUTPATIENT
Start: 2025-08-05 | End: 2025-08-05

## 2025-08-05 RX ORDER — OXYTOCIN 10 [USP'U]/ML
10 INJECTION, SOLUTION INTRAMUSCULAR; INTRAVENOUS
Status: DISCONTINUED | OUTPATIENT
Start: 2025-08-05 | End: 2025-08-08 | Stop reason: HOSPADM

## 2025-08-05 RX ORDER — LIDOCAINE 40 MG/G
CREAM TOPICAL
Status: DISCONTINUED | OUTPATIENT
Start: 2025-08-05 | End: 2025-08-05

## 2025-08-05 RX ORDER — LOPERAMIDE HYDROCHLORIDE 2 MG/1
2 CAPSULE ORAL
Status: DISCONTINUED | OUTPATIENT
Start: 2025-08-05 | End: 2025-08-05

## 2025-08-05 RX ORDER — HYDRALAZINE HYDROCHLORIDE 20 MG/ML
10 INJECTION INTRAMUSCULAR; INTRAVENOUS
Status: DISCONTINUED | OUTPATIENT
Start: 2025-08-05 | End: 2025-08-08 | Stop reason: HOSPADM

## 2025-08-05 RX ORDER — LABETALOL HYDROCHLORIDE 5 MG/ML
20-80 INJECTION, SOLUTION INTRAVENOUS EVERY 10 MIN PRN
Status: DISCONTINUED | OUTPATIENT
Start: 2025-08-05 | End: 2025-08-08 | Stop reason: HOSPADM

## 2025-08-05 RX ORDER — DIPHENHYDRAMINE HYDROCHLORIDE 50 MG/ML
25 INJECTION, SOLUTION INTRAMUSCULAR; INTRAVENOUS EVERY 6 HOURS PRN
Status: DISCONTINUED | OUTPATIENT
Start: 2025-08-05 | End: 2025-08-08 | Stop reason: HOSPADM

## 2025-08-05 RX ORDER — ACETAMINOPHEN 325 MG/1
975 TABLET ORAL EVERY 6 HOURS
Status: DISCONTINUED | OUTPATIENT
Start: 2025-08-05 | End: 2025-08-08 | Stop reason: HOSPADM

## 2025-08-05 RX ORDER — MISOPROSTOL 200 UG/1
400 TABLET ORAL
Status: DISCONTINUED | OUTPATIENT
Start: 2025-08-05 | End: 2025-08-08 | Stop reason: HOSPADM

## 2025-08-05 RX ORDER — MISOPROSTOL 200 UG/1
800 TABLET ORAL
Status: DISCONTINUED | OUTPATIENT
Start: 2025-08-05 | End: 2025-08-05

## 2025-08-05 RX ORDER — TRANEXAMIC ACID 10 MG/ML
1 INJECTION, SOLUTION INTRAVENOUS EVERY 30 MIN PRN
Status: DISCONTINUED | OUTPATIENT
Start: 2025-08-05 | End: 2025-08-05

## 2025-08-05 RX ORDER — METOCLOPRAMIDE 10 MG/1
10 TABLET ORAL EVERY 6 HOURS PRN
Status: DISCONTINUED | OUTPATIENT
Start: 2025-08-05 | End: 2025-08-08 | Stop reason: HOSPADM

## 2025-08-05 RX ORDER — MISOPROSTOL 200 UG/1
800 TABLET ORAL
Status: DISCONTINUED | OUTPATIENT
Start: 2025-08-05 | End: 2025-08-08 | Stop reason: HOSPADM

## 2025-08-05 RX ORDER — METOCLOPRAMIDE HYDROCHLORIDE 5 MG/ML
10 INJECTION INTRAMUSCULAR; INTRAVENOUS EVERY 6 HOURS PRN
Status: DISCONTINUED | OUTPATIENT
Start: 2025-08-05 | End: 2025-08-08 | Stop reason: HOSPADM

## 2025-08-05 RX ORDER — METOCLOPRAMIDE HYDROCHLORIDE 5 MG/ML
10 INJECTION INTRAMUSCULAR; INTRAVENOUS EVERY 6 HOURS PRN
Status: DISCONTINUED | OUTPATIENT
Start: 2025-08-05 | End: 2025-08-05

## 2025-08-05 RX ORDER — CALCIUM CARBONATE 500 MG/1
1000 TABLET, CHEWABLE ORAL EVERY 6 HOURS PRN
Status: DISCONTINUED | OUTPATIENT
Start: 2025-08-05 | End: 2025-08-08 | Stop reason: HOSPADM

## 2025-08-05 RX ORDER — METHYLERGONOVINE MALEATE 0.2 MG/ML
200 INJECTION INTRAVENOUS
Status: DISCONTINUED | OUTPATIENT
Start: 2025-08-05 | End: 2025-08-05

## 2025-08-05 RX ORDER — NALOXONE HYDROCHLORIDE 0.4 MG/ML
0.4 INJECTION, SOLUTION INTRAMUSCULAR; INTRAVENOUS; SUBCUTANEOUS
Status: DISCONTINUED | OUTPATIENT
Start: 2025-08-05 | End: 2025-08-08 | Stop reason: HOSPADM

## 2025-08-05 RX ORDER — SODIUM CHLORIDE, SODIUM LACTATE, POTASSIUM CHLORIDE, CALCIUM CHLORIDE 600; 310; 30; 20 MG/100ML; MG/100ML; MG/100ML; MG/100ML
INJECTION, SOLUTION INTRAVENOUS CONTINUOUS
Status: DISCONTINUED | OUTPATIENT
Start: 2025-08-05 | End: 2025-08-05

## 2025-08-05 RX ORDER — NALBUPHINE HYDROCHLORIDE 10 MG/ML
2.5-5 INJECTION INTRAMUSCULAR; INTRAVENOUS; SUBCUTANEOUS EVERY 6 HOURS PRN
Status: DISCONTINUED | OUTPATIENT
Start: 2025-08-05 | End: 2025-08-05

## 2025-08-05 RX ORDER — OXYTOCIN/0.9 % SODIUM CHLORIDE 30/500 ML
100-340 PLASTIC BAG, INJECTION (ML) INTRAVENOUS CONTINUOUS PRN
Status: DISCONTINUED | OUTPATIENT
Start: 2025-08-05 | End: 2025-08-05

## 2025-08-05 RX ORDER — IBUPROFEN 800 MG/1
800 TABLET, FILM COATED ORAL EVERY 6 HOURS
Status: DISCONTINUED | OUTPATIENT
Start: 2025-08-06 | End: 2025-08-08 | Stop reason: HOSPADM

## 2025-08-05 RX ORDER — ONDANSETRON 4 MG/1
4 TABLET, ORALLY DISINTEGRATING ORAL EVERY 6 HOURS PRN
Status: DISCONTINUED | OUTPATIENT
Start: 2025-08-05 | End: 2025-08-08 | Stop reason: HOSPADM

## 2025-08-05 RX ADMIN — KETOROLAC TROMETHAMINE 15 MG: 30 INJECTION, SOLUTION INTRAMUSCULAR at 11:42

## 2025-08-05 RX ADMIN — SODIUM CHLORIDE, SODIUM LACTATE, POTASSIUM CHLORIDE, AND CALCIUM CHLORIDE: .6; .31; .03; .02 INJECTION, SOLUTION INTRAVENOUS at 09:21

## 2025-08-05 RX ADMIN — SODIUM CITRATE AND CITRIC ACID MONOHYDRATE 30 ML: 500; 334 SOLUTION ORAL at 09:53

## 2025-08-05 RX ADMIN — SENNOSIDES AND DOCUSATE SODIUM 1 TABLET: 50; 8.6 TABLET ORAL at 19:59

## 2025-08-05 RX ADMIN — OXYCODONE HYDROCHLORIDE 5 MG: 5 TABLET ORAL at 20:00

## 2025-08-05 RX ADMIN — FENTANYL CITRATE 15 MCG: 50 INJECTION INTRAMUSCULAR; INTRAVENOUS at 10:35

## 2025-08-05 RX ADMIN — KETOROLAC TROMETHAMINE 15 MG: 15 INJECTION, SOLUTION INTRAMUSCULAR; INTRAVENOUS at 18:01

## 2025-08-05 RX ADMIN — SODIUM CHLORIDE, SODIUM LACTATE, POTASSIUM CHLORIDE, AND CALCIUM CHLORIDE: .6; .31; .03; .02 INJECTION, SOLUTION INTRAVENOUS at 10:31

## 2025-08-05 RX ADMIN — TRANEXAMIC ACID 1000 MG: 1 INJECTION, SOLUTION INTRAVENOUS at 11:20

## 2025-08-05 RX ADMIN — ACETAMINOPHEN 975 MG: 325 TABLET ORAL at 15:54

## 2025-08-05 RX ADMIN — Medication 2 G: at 10:39

## 2025-08-05 RX ADMIN — Medication 300 ML/HR: at 11:03

## 2025-08-05 RX ADMIN — ACETAMINOPHEN 975 MG: 325 TABLET ORAL at 09:34

## 2025-08-05 RX ADMIN — OXYCODONE HYDROCHLORIDE 5 MG: 5 TABLET ORAL at 14:51

## 2025-08-05 RX ADMIN — PHENYLEPHRINE HYDROCHLORIDE 75 MCG/MIN: 10 INJECTION INTRAVENOUS at 10:39

## 2025-08-05 RX ADMIN — ACETAMINOPHEN 975 MG: 325 TABLET ORAL at 22:55

## 2025-08-05 RX ADMIN — ONDANSETRON 4 MG: 2 INJECTION INTRAMUSCULAR; INTRAVENOUS at 10:45

## 2025-08-05 RX ADMIN — MORPHINE SULFATE 0.15 MG: 1 INJECTION EPIDURAL; INTRATHECAL; INTRAVENOUS at 10:35

## 2025-08-05 RX ADMIN — BUPIVACAINE HYDROCHLORIDE IN DEXTROSE 1.5 ML: 7.5 INJECTION, SOLUTION SUBARACHNOID at 10:35

## 2025-08-05 ASSESSMENT — ACTIVITIES OF DAILY LIVING (ADL)
ADLS_ACUITY_SCORE: 33
ADLS_ACUITY_SCORE: 29
ADLS_ACUITY_SCORE: 33
ADLS_ACUITY_SCORE: 29
ADLS_ACUITY_SCORE: 33
ADLS_ACUITY_SCORE: 25
ADLS_ACUITY_SCORE: 29
ADLS_ACUITY_SCORE: 33
ADLS_ACUITY_SCORE: 33
ADLS_ACUITY_SCORE: 29
ADLS_ACUITY_SCORE: 33
ADLS_ACUITY_SCORE: 25
ADLS_ACUITY_SCORE: 29
ADLS_ACUITY_SCORE: 33
ADLS_ACUITY_SCORE: 33

## 2025-08-06 LAB
ALT SERPL W P-5'-P-CCNC: 8 U/L (ref 0–50)
AST SERPL W P-5'-P-CCNC: 17 U/L (ref 0–45)
CREAT SERPL-MCNC: 0.53 MG/DL (ref 0.51–0.95)
EGFRCR SERPLBLD CKD-EPI 2021: >90 ML/MIN/1.73M2
GLUCOSE BLDC GLUCOMTR-MCNC: 95 MG/DL (ref 70–99)
GLUCOSE SERPL-MCNC: 98 MG/DL (ref 70–99)
HGB BLD-MCNC: 9.2 G/DL (ref 11.7–15.7)
MCV RBC AUTO: 92 FL (ref 78–100)

## 2025-08-06 PROCEDURE — 120N000002 HC R&B MED SURG/OB UMMC

## 2025-08-06 PROCEDURE — 250N000013 HC RX MED GY IP 250 OP 250 PS 637

## 2025-08-06 PROCEDURE — 82947 ASSAY GLUCOSE BLOOD QUANT: CPT

## 2025-08-06 PROCEDURE — 36415 COLL VENOUS BLD VENIPUNCTURE: CPT

## 2025-08-06 PROCEDURE — 85018 HEMOGLOBIN: CPT

## 2025-08-06 PROCEDURE — 82565 ASSAY OF CREATININE: CPT

## 2025-08-06 PROCEDURE — 84450 TRANSFERASE (AST) (SGOT): CPT

## 2025-08-06 PROCEDURE — 84460 ALANINE AMINO (ALT) (SGPT): CPT

## 2025-08-06 PROCEDURE — 250N000011 HC RX IP 250 OP 636

## 2025-08-06 RX ORDER — ACETAMINOPHEN 325 MG/1
650 TABLET ORAL EVERY 6 HOURS PRN
Qty: 30 TABLET | Refills: 0 | Status: SHIPPED | OUTPATIENT
Start: 2025-08-06

## 2025-08-06 RX ORDER — ENOXAPARIN SODIUM 100 MG/ML
40 INJECTION SUBCUTANEOUS EVERY 24 HOURS
Status: DISCONTINUED | OUTPATIENT
Start: 2025-08-06 | End: 2025-08-08 | Stop reason: HOSPADM

## 2025-08-06 RX ORDER — AMOXICILLIN 250 MG
1 CAPSULE ORAL 2 TIMES DAILY PRN
Qty: 30 TABLET | Refills: 0 | Status: SHIPPED | OUTPATIENT
Start: 2025-08-06

## 2025-08-06 RX ORDER — FERROUS SULFATE 325(65) MG
325 TABLET ORAL EVERY OTHER DAY
Qty: 30 TABLET | Refills: 0 | Status: SHIPPED | OUTPATIENT
Start: 2025-08-06

## 2025-08-06 RX ORDER — IBUPROFEN 600 MG/1
600 TABLET, FILM COATED ORAL EVERY 6 HOURS PRN
Qty: 30 TABLET | Refills: 0 | Status: SHIPPED | OUTPATIENT
Start: 2025-08-06

## 2025-08-06 RX ADMIN — ACETAMINOPHEN 975 MG: 325 TABLET ORAL at 10:22

## 2025-08-06 RX ADMIN — SIMETHICONE 80 MG: 80 TABLET, CHEWABLE ORAL at 20:15

## 2025-08-06 RX ADMIN — KETOROLAC TROMETHAMINE 15 MG: 15 INJECTION, SOLUTION INTRAMUSCULAR; INTRAVENOUS at 00:00

## 2025-08-06 RX ADMIN — IBUPROFEN 800 MG: 800 TABLET, FILM COATED ORAL at 12:29

## 2025-08-06 RX ADMIN — OXYCODONE HYDROCHLORIDE 5 MG: 5 TABLET ORAL at 12:29

## 2025-08-06 RX ADMIN — OXYCODONE HYDROCHLORIDE 5 MG: 5 TABLET ORAL at 08:24

## 2025-08-06 RX ADMIN — ACETAMINOPHEN 975 MG: 325 TABLET ORAL at 04:27

## 2025-08-06 RX ADMIN — SENNOSIDES AND DOCUSATE SODIUM 1 TABLET: 50; 8.6 TABLET ORAL at 20:15

## 2025-08-06 RX ADMIN — KETOROLAC TROMETHAMINE 15 MG: 15 INJECTION, SOLUTION INTRAMUSCULAR; INTRAVENOUS at 06:02

## 2025-08-06 RX ADMIN — ACETAMINOPHEN 975 MG: 325 TABLET ORAL at 16:05

## 2025-08-06 RX ADMIN — SENNOSIDES, DOCUSATE SODIUM 2 TABLET: 50; 8.6 TABLET, FILM COATED ORAL at 08:24

## 2025-08-06 RX ADMIN — SIMETHICONE 80 MG: 80 TABLET, CHEWABLE ORAL at 14:41

## 2025-08-06 RX ADMIN — OXYCODONE HYDROCHLORIDE 5 MG: 5 TABLET ORAL at 16:08

## 2025-08-06 RX ADMIN — OXYCODONE HYDROCHLORIDE 5 MG: 5 TABLET ORAL at 20:15

## 2025-08-06 RX ADMIN — IBUPROFEN 800 MG: 800 TABLET, FILM COATED ORAL at 18:34

## 2025-08-06 RX ADMIN — OXYCODONE HYDROCHLORIDE 5 MG: 5 TABLET ORAL at 04:20

## 2025-08-06 RX ADMIN — OXYCODONE HYDROCHLORIDE 5 MG: 5 TABLET ORAL at 00:00

## 2025-08-06 RX ADMIN — SIMETHICONE 80 MG: 80 TABLET, CHEWABLE ORAL at 08:24

## 2025-08-06 RX ADMIN — ENOXAPARIN SODIUM 40 MG: 40 INJECTION SUBCUTANEOUS at 12:29

## 2025-08-06 ASSESSMENT — ACTIVITIES OF DAILY LIVING (ADL)
ADLS_ACUITY_SCORE: 29
ADLS_ACUITY_SCORE: 35
ADLS_ACUITY_SCORE: 29
ADLS_ACUITY_SCORE: 34
ADLS_ACUITY_SCORE: 29
ADLS_ACUITY_SCORE: 35
ADLS_ACUITY_SCORE: 35
ADLS_ACUITY_SCORE: 29
ADLS_ACUITY_SCORE: 29
ADLS_ACUITY_SCORE: 35
ADLS_ACUITY_SCORE: 35
ADLS_ACUITY_SCORE: 29
ADLS_ACUITY_SCORE: 29
ADLS_ACUITY_SCORE: 34
ADLS_ACUITY_SCORE: 35
ADLS_ACUITY_SCORE: 29

## 2025-08-07 VITALS
WEIGHT: 181.4 LBS | BODY MASS INDEX: 35.61 KG/M2 | TEMPERATURE: 98 F | HEIGHT: 60 IN | OXYGEN SATURATION: 98 % | HEART RATE: 72 BPM | RESPIRATION RATE: 16 BRPM | DIASTOLIC BLOOD PRESSURE: 68 MMHG | SYSTOLIC BLOOD PRESSURE: 113 MMHG

## 2025-08-07 PROCEDURE — 120N000002 HC R&B MED SURG/OB UMMC

## 2025-08-07 PROCEDURE — 36415 COLL VENOUS BLD VENIPUNCTURE: CPT | Performed by: MEDICAL GENETICS

## 2025-08-07 PROCEDURE — 250N000013 HC RX MED GY IP 250 OP 250 PS 637

## 2025-08-07 PROCEDURE — 99232 SBSQ HOSP IP/OBS MODERATE 35: CPT | Mod: 24 | Performed by: OBSTETRICS & GYNECOLOGY

## 2025-08-07 PROCEDURE — 250N000011 HC RX IP 250 OP 636

## 2025-08-07 RX ORDER — LIDOCAINE 4 G/G
1 PATCH TOPICAL
Status: DISCONTINUED | OUTPATIENT
Start: 2025-08-07 | End: 2025-08-08 | Stop reason: HOSPADM

## 2025-08-07 RX ADMIN — ACETAMINOPHEN 975 MG: 325 TABLET ORAL at 10:45

## 2025-08-07 RX ADMIN — IBUPROFEN 800 MG: 800 TABLET, FILM COATED ORAL at 06:22

## 2025-08-07 RX ADMIN — OXYCODONE HYDROCHLORIDE 5 MG: 5 TABLET ORAL at 17:22

## 2025-08-07 RX ADMIN — SENNOSIDES AND DOCUSATE SODIUM 1 TABLET: 50; 8.6 TABLET ORAL at 20:01

## 2025-08-07 RX ADMIN — ACETAMINOPHEN 975 MG: 325 TABLET ORAL at 04:45

## 2025-08-07 RX ADMIN — OXYCODONE HYDROCHLORIDE 5 MG: 5 TABLET ORAL at 04:46

## 2025-08-07 RX ADMIN — ACETAMINOPHEN 975 MG: 325 TABLET ORAL at 23:46

## 2025-08-07 RX ADMIN — OXYCODONE HYDROCHLORIDE 5 MG: 5 TABLET ORAL at 08:35

## 2025-08-07 RX ADMIN — SENNOSIDES AND DOCUSATE SODIUM 1 TABLET: 50; 8.6 TABLET ORAL at 08:34

## 2025-08-07 RX ADMIN — LIDOCAINE 1 PATCH: 4 PATCH TOPICAL at 20:02

## 2025-08-07 RX ADMIN — ENOXAPARIN SODIUM 40 MG: 40 INJECTION SUBCUTANEOUS at 12:51

## 2025-08-07 RX ADMIN — SENNOSIDES AND DOCUSATE SODIUM 1 TABLET: 50; 8.6 TABLET ORAL at 21:34

## 2025-08-07 RX ADMIN — SIMETHICONE 80 MG: 80 TABLET, CHEWABLE ORAL at 15:24

## 2025-08-07 RX ADMIN — IBUPROFEN 800 MG: 800 TABLET, FILM COATED ORAL at 23:46

## 2025-08-07 RX ADMIN — OXYCODONE HYDROCHLORIDE 5 MG: 5 TABLET ORAL at 00:33

## 2025-08-07 RX ADMIN — IBUPROFEN 800 MG: 800 TABLET, FILM COATED ORAL at 12:50

## 2025-08-07 RX ADMIN — SIMETHICONE 80 MG: 80 TABLET, CHEWABLE ORAL at 08:34

## 2025-08-07 RX ADMIN — OXYCODONE HYDROCHLORIDE 5 MG: 5 TABLET ORAL at 12:51

## 2025-08-07 RX ADMIN — OXYCODONE HYDROCHLORIDE 5 MG: 5 TABLET ORAL at 21:34

## 2025-08-07 RX ADMIN — IBUPROFEN 800 MG: 800 TABLET, FILM COATED ORAL at 00:33

## 2025-08-07 RX ADMIN — IBUPROFEN 800 MG: 800 TABLET, FILM COATED ORAL at 18:43

## 2025-08-07 RX ADMIN — ACETAMINOPHEN 975 MG: 325 TABLET ORAL at 17:22

## 2025-08-07 ASSESSMENT — ACTIVITIES OF DAILY LIVING (ADL)
ADLS_ACUITY_SCORE: 29

## 2025-08-08 VITALS
SYSTOLIC BLOOD PRESSURE: 114 MMHG | TEMPERATURE: 98.7 F | OXYGEN SATURATION: 98 % | HEIGHT: 60 IN | BODY MASS INDEX: 35.62 KG/M2 | HEART RATE: 88 BPM | DIASTOLIC BLOOD PRESSURE: 74 MMHG | RESPIRATION RATE: 16 BRPM | WEIGHT: 181.44 LBS

## 2025-08-08 LAB
LAB TEST RESULTS REPORTED IN RPTPERIOD: NORMAL
SPECIMEN TYPE: NORMAL

## 2025-08-08 PROCEDURE — 250N000013 HC RX MED GY IP 250 OP 250 PS 637

## 2025-08-08 PROCEDURE — 250N000011 HC RX IP 250 OP 636

## 2025-08-08 RX ORDER — OXYCODONE HYDROCHLORIDE 5 MG/1
5 TABLET ORAL 3 TIMES DAILY PRN
Qty: 10 TABLET | Refills: 0 | Status: SHIPPED | OUTPATIENT
Start: 2025-08-08

## 2025-08-08 RX ADMIN — OXYCODONE HYDROCHLORIDE 5 MG: 5 TABLET ORAL at 04:39

## 2025-08-08 RX ADMIN — IBUPROFEN 800 MG: 800 TABLET, FILM COATED ORAL at 08:49

## 2025-08-08 RX ADMIN — OXYCODONE HYDROCHLORIDE 5 MG: 5 TABLET ORAL at 08:48

## 2025-08-08 RX ADMIN — SENNOSIDES AND DOCUSATE SODIUM 1 TABLET: 50; 8.6 TABLET ORAL at 08:49

## 2025-08-08 RX ADMIN — ENOXAPARIN SODIUM 40 MG: 40 INJECTION SUBCUTANEOUS at 13:21

## 2025-08-08 RX ADMIN — OXYCODONE HYDROCHLORIDE 5 MG: 5 TABLET ORAL at 13:21

## 2025-08-08 RX ADMIN — ACETAMINOPHEN 975 MG: 325 TABLET ORAL at 11:03

## 2025-08-08 ASSESSMENT — ACTIVITIES OF DAILY LIVING (ADL)
ADLS_ACUITY_SCORE: 29

## 2025-08-12 LAB
PATH REPORT.COMMENTS IMP SPEC: NORMAL
PATH REPORT.COMMENTS IMP SPEC: NORMAL
PATH REPORT.FINAL DX SPEC: NORMAL
PATH REPORT.GROSS SPEC: NORMAL
PATH REPORT.MICROSCOPIC SPEC OTHER STN: NORMAL
PATH REPORT.RELEVANT HX SPEC: NORMAL
PHOTO IMAGE: NORMAL

## 2025-08-16 ENCOUNTER — LACTATION ENCOUNTER (OUTPATIENT)
Dept: OTHER | Facility: CLINIC | Age: 40
End: 2025-08-16

## 2025-08-27 ENCOUNTER — LACTATION ENCOUNTER (OUTPATIENT)
Dept: OTHER | Facility: CLINIC | Age: 40
End: 2025-08-27

## 2025-09-04 ENCOUNTER — LACTATION ENCOUNTER (OUTPATIENT)
Dept: OTHER | Facility: CLINIC | Age: 40
End: 2025-09-04

## (undated) DEVICE — SUTURE VICRYL+ 0 36IN CTX VIOLET VCP978H

## (undated) DEVICE — SU VICRYL 3-0 CTX 36" UND J980H

## (undated) DEVICE — SPONGE LAP 18X18" X8435

## (undated) DEVICE — GLOVE PROTEXIS BLUE W/NEU-THERA 6.5  2D73EB65

## (undated) DEVICE — STOCKING SLEEVE COMPRESSION CALF LG

## (undated) DEVICE — PAD INSERT MED PEACH 14X6.5 62550

## (undated) DEVICE — DRESSING MEPILEX BORDER POST-OP 4X10

## (undated) DEVICE — SUTURE PDS 0 60IN CTX+ LOOPED PDP990G

## (undated) DEVICE — TUBING SUCTION 12"X1/4" N612

## (undated) DEVICE — SUCTION KIWI VAC PRO CUP DELIVERY SYSTEM VAC-6000S

## (undated) DEVICE — STRAP KNEE/BODY 31143004

## (undated) DEVICE — GLOVE PROTEXIS MICRO 7.5  2D73PM75

## (undated) DEVICE — DRAPE SETUP C-SECTION INVISISHIELD 54X90" DYNJE5600

## (undated) DEVICE — TUBING IRRIG TUR Y TYPE 96" LF 6543-01

## (undated) DEVICE — SOL NACL 0.9% IRRIG 1000ML BOTTLE 07138-09

## (undated) DEVICE — SOL NACL 0.9% IRRIG 1000ML BOTTLE 2F7124

## (undated) DEVICE — DRSG ABD TNDRSRB WET PRUF 8IN X 10IN STRL  9194A

## (undated) DEVICE — SOL WATER IRRIG 1000ML BOTTLE 2F7114

## (undated) DEVICE — PLATE GROUNDING ADULT W/CORD 9165L

## (undated) DEVICE — SU VICRYL 0 CTX 36" J370H

## (undated) DEVICE — SU MONOCRYL+ 4-0 18IN PS2 UND MCP496G

## (undated) DEVICE — BLADE CLIPPER PIVOT PURPLE DISP 9660

## (undated) DEVICE — ESU PENCIL W/SMOKE EVAC NEPTUNE STRYKER 0703-046-000

## (undated) DEVICE — SOL WATER IRRIG 1000ML BOTTLE 07139-09

## (undated) DEVICE — PACK MINOR SINGLE BASIN SSK3001

## (undated) DEVICE — GLOVE ESTEEM POWDER FREE SMT 6.0  2D72PT60

## (undated) DEVICE — SUCTION CANISTER MEDIVAC LINER 3000ML W/LID 65651-530

## (undated) DEVICE — PACK MAJOR BASIN 673

## (undated) DEVICE — PACK C-SECTION LF PL15OTA83B

## (undated) DEVICE — SUTURE VICRYL+ 3-0 27IN CT-1 UND VCP258H

## (undated) DEVICE — CATH TRAY FOLEY 16FR BARDEX W/DRAIN BAG STATLOCK 300316A

## (undated) DEVICE — ADH SKIN CLOSURE PREMIERPRO EXOFIN 1.0ML 3470

## (undated) DEVICE — PACK TVT HYSTEROSCOPY SMA15HYFSE

## (undated) DEVICE — GOWN IMPERVIOUS BREATHABLE SMART LG 89015

## (undated) DEVICE — BARRIER SEPRAFILM 5X6" SINGLE SHEET 4301-02

## (undated) DEVICE — SU VICRYL 0 CT-1 36" J346H

## (undated) DEVICE — SU VICRYL 3-0 CT-1 27" UND J258H

## (undated) DEVICE — GLOVE SURG PI ULTRA TOUCH M SZ 6-1/2 LF

## (undated) DEVICE — CUSTOM PACK C-SECTION LHE

## (undated) DEVICE — PREP CHLORAPREP 26ML TINTED HI-LITE ORANGE 930815

## (undated) DEVICE — SU MONOCRYL 0 CT-1 36" Y346H

## (undated) DEVICE — LINEN TOWEL PACK X5 5464

## (undated) DEVICE — SUCTION MANIFOLD NEPTUNE 2 SYS 1 PORT 702-025-000

## (undated) DEVICE — SOL NACL 0.9% INJ 1000ML BAG 2B1324X

## (undated) DEVICE — PREP CHLORAPREP 26ML TINTED ORANGE  260815

## (undated) DEVICE — SPONGE RAY-TEC 4X8" 7318

## (undated) DEVICE — DRSG STERI STRIP 1/2X4" R1547

## (undated) RX ORDER — PROPOFOL 10 MG/ML
INJECTION, EMULSION INTRAVENOUS
Status: DISPENSED
Start: 2020-09-10

## (undated) RX ORDER — MORPHINE SULFATE 1 MG/ML
INJECTION, SOLUTION EPIDURAL; INTRATHECAL; INTRAVENOUS
Status: DISPENSED
Start: 2023-03-14

## (undated) RX ORDER — MORPHINE SULFATE 1 MG/ML
INJECTION, SOLUTION EPIDURAL; INTRATHECAL; INTRAVENOUS
Status: DISPENSED
Start: 2025-08-05

## (undated) RX ORDER — HYDROCODONE BITARTRATE AND ACETAMINOPHEN 5; 325 MG/1; MG/1
TABLET ORAL
Status: DISPENSED
Start: 2020-09-10

## (undated) RX ORDER — FENTANYL CITRATE-0.9 % NACL/PF 10 MCG/ML
PLASTIC BAG, INJECTION (ML) INTRAVENOUS
Status: DISPENSED
Start: 2023-03-14

## (undated) RX ORDER — FENTANYL CITRATE 50 UG/ML
INJECTION, SOLUTION INTRAMUSCULAR; INTRAVENOUS
Status: DISPENSED
Start: 2025-08-05

## (undated) RX ORDER — KETOROLAC TROMETHAMINE 30 MG/ML
INJECTION, SOLUTION INTRAMUSCULAR; INTRAVENOUS
Status: DISPENSED
Start: 2020-09-10

## (undated) RX ORDER — TRANEXAMIC ACID 10 MG/ML
INJECTION, SOLUTION INTRAVENOUS
Status: DISPENSED
Start: 2025-08-05

## (undated) RX ORDER — GLYCOPYRROLATE 0.2 MG/ML
INJECTION, SOLUTION INTRAMUSCULAR; INTRAVENOUS
Status: DISPENSED
Start: 2020-09-10

## (undated) RX ORDER — ONDANSETRON 2 MG/ML
INJECTION INTRAMUSCULAR; INTRAVENOUS
Status: DISPENSED
Start: 2020-09-10

## (undated) RX ORDER — LIDOCAINE HYDROCHLORIDE 20 MG/ML
INJECTION, SOLUTION EPIDURAL; INFILTRATION; INTRACAUDAL; PERINEURAL
Status: DISPENSED
Start: 2020-09-10

## (undated) RX ORDER — FENTANYL CITRATE 50 UG/ML
INJECTION, SOLUTION INTRAMUSCULAR; INTRAVENOUS
Status: DISPENSED
Start: 2020-09-10

## (undated) RX ORDER — DEXAMETHASONE SODIUM PHOSPHATE 4 MG/ML
INJECTION, SOLUTION INTRA-ARTICULAR; INTRALESIONAL; INTRAMUSCULAR; INTRAVENOUS; SOFT TISSUE
Status: DISPENSED
Start: 2020-09-10

## (undated) RX ORDER — FENTANYL CITRATE 0.05 MG/ML
INJECTION, SOLUTION INTRAMUSCULAR; INTRAVENOUS
Status: DISPENSED
Start: 2020-09-10